# Patient Record
Sex: FEMALE | Race: WHITE | NOT HISPANIC OR LATINO | URBAN - METROPOLITAN AREA
[De-identification: names, ages, dates, MRNs, and addresses within clinical notes are randomized per-mention and may not be internally consistent; named-entity substitution may affect disease eponyms.]

---

## 2018-01-25 ENCOUNTER — INPATIENT (INPATIENT)
Facility: HOSPITAL | Age: 28
LOS: 41 days | Discharge: NURSING, REHAB OR LONG-TERM CARE | End: 2018-03-08
Attending: HOSPITALIST

## 2018-02-03 VITALS
DIASTOLIC BLOOD PRESSURE: 57 MMHG | HEART RATE: 90 BPM | SYSTOLIC BLOOD PRESSURE: 118 MMHG | RESPIRATION RATE: 19 BRPM | TEMPERATURE: 100 F

## 2018-02-03 DIAGNOSIS — T50.904A POISONING BY UNSPECIFIED DRUGS, MEDICAMENTS AND BIOLOGICAL SUBSTANCES, UNDETERMINED, INITIAL ENCOUNTER: ICD-10-CM

## 2018-02-03 DIAGNOSIS — J96.00 ACUTE RESPIRATORY FAILURE, UNSPECIFIED WHETHER WITH HYPOXIA OR HYPERCAPNIA: ICD-10-CM

## 2018-02-03 DIAGNOSIS — G93.40 ENCEPHALOPATHY, UNSPECIFIED: ICD-10-CM

## 2018-02-03 LAB
BLD GP AB SCN SERPL QL: SIGNIFICANT CHANGE UP
TYPE + AB SCN PNL BLD: SIGNIFICANT CHANGE UP

## 2018-02-03 RX ORDER — PANTOPRAZOLE SODIUM 20 MG/1
40 TABLET, DELAYED RELEASE ORAL
Qty: 0 | Refills: 0 | Status: DISCONTINUED | OUTPATIENT
Start: 2018-02-03 | End: 2018-02-05

## 2018-02-03 RX ORDER — HEPARIN SODIUM 5000 [USP'U]/ML
5000 INJECTION INTRAVENOUS; SUBCUTANEOUS EVERY 8 HOURS
Qty: 0 | Refills: 0 | Status: DISCONTINUED | OUTPATIENT
Start: 2018-02-03 | End: 2018-02-05

## 2018-02-03 RX ORDER — CHLORHEXIDINE GLUCONATE 213 G/1000ML
15 SOLUTION TOPICAL
Qty: 0 | Refills: 0 | Status: DISCONTINUED | OUTPATIENT
Start: 2018-02-03 | End: 2018-02-05

## 2018-02-03 RX ORDER — NOREPINEPHRINE BITARTRATE/D5W 8 MG/250ML
0.01 PLASTIC BAG, INJECTION (ML) INTRAVENOUS
Qty: 16 | Refills: 0 | Status: DISCONTINUED | OUTPATIENT
Start: 2018-02-03 | End: 2018-02-03

## 2018-02-03 RX ADMIN — HEPARIN SODIUM 5000 UNIT(S): 5000 INJECTION INTRAVENOUS; SUBCUTANEOUS at 15:10

## 2018-02-03 RX ADMIN — HEPARIN SODIUM 5000 UNIT(S): 5000 INJECTION INTRAVENOUS; SUBCUTANEOUS at 21:47

## 2018-02-03 RX ADMIN — CHLORHEXIDINE GLUCONATE 15 MILLILITER(S): 213 SOLUTION TOPICAL at 17:38

## 2018-02-03 NOTE — PROGRESS NOTE ADULT - SUBJECTIVE AND OBJECTIVE BOX
Patient is a 27y old  Female who presents with a chief complaint of     Diagnosis:  opioid OVERDOSE      HOSPITAL DAY NO: 7      TMAX 100.6  MAP: 77        Vent dependent: Y__x__  N ____    vent settings TV: 450   RR: 16   FIO2: 30    PEEP 5                                            Weaning time: tolerated t tube    Significant exam findings:   MS: unresponsive +ve brainstem reflex   CHEST : b/l bs   ABDOMNEN: soft   SKIN:intact      No of BMs:     Nutrition:                 __PEG _x_OG __NG  ___ORAL   ___TPN      LABS:                                                                                                                                                                                            MEDICATIONS  (STANDING):  chlorhexidine 0.12% Liquid 15 milliLiter(s) Swish and Spit two times a day  heparin  Injectable 5000 Unit(s) SubCutaneous every 8 hours  pantoprazole   Suspension 40 milliGRAM(s) Oral before breakfast    MEDICATIONS  (PRN):

## 2018-02-04 RX ADMIN — CHLORHEXIDINE GLUCONATE 15 MILLILITER(S): 213 SOLUTION TOPICAL at 17:14

## 2018-02-04 RX ADMIN — HEPARIN SODIUM 5000 UNIT(S): 5000 INJECTION INTRAVENOUS; SUBCUTANEOUS at 05:42

## 2018-02-04 RX ADMIN — HEPARIN SODIUM 5000 UNIT(S): 5000 INJECTION INTRAVENOUS; SUBCUTANEOUS at 15:02

## 2018-02-04 RX ADMIN — CHLORHEXIDINE GLUCONATE 15 MILLILITER(S): 213 SOLUTION TOPICAL at 05:42

## 2018-02-04 RX ADMIN — HEPARIN SODIUM 5000 UNIT(S): 5000 INJECTION INTRAVENOUS; SUBCUTANEOUS at 21:26

## 2018-02-04 RX ADMIN — PANTOPRAZOLE SODIUM 40 MILLIGRAM(S): 20 TABLET, DELAYED RELEASE ORAL at 05:43

## 2018-02-04 NOTE — PROGRESS NOTE ADULT - SUBJECTIVE AND OBJECTIVE BOX
27y Female PAST MEDICAL & SURGICAL HISTORY:      Hospital Day: 11  Post Operative Day:    Procedure: FOR TRACH    Events of the Last 24h: NO ACUTE EVENTS, SiO2=30%, PEEP = 5    Vital Signs Last 24 Hrs  T(C): 37.3 (04 Feb 2018 00:09), Max: 37.8 (03 Feb 2018 12:27)  T(F): 99.2 (04 Feb 2018 00:09), Max: 100 (03 Feb 2018 12:27)  HR: 59 (04 Feb 2018 00:09) (59 - 90)  BP: 118/68 (04 Feb 2018 00:09) (118/57 - 118/68)  BP(mean): --  RR: 16 (04 Feb 2018 00:09) (16 - 19)  SpO2: --      I&O's Summary    03 Feb 2018 07:01  -  04 Feb 2018 05:13  --------------------------------------------------------  IN: 300 mL / OUT: 3 mL / NET: 297 mL     I&O's Detail    03 Feb 2018 07:01  -  04 Feb 2018 05:13  --------------------------------------------------------  IN:    Enteral Tube Flush: 60 mL    Osmolite: 240 mL  Total IN: 300 mL    OUT:    Stool: 1 mL    Voided: 2 mL  Total OUT: 3 mL    Total NET: 297 mL          PHYSICAL EXAM:    GENERAL: NAD    HEENT: NCAT    CHEST/LUNGS: CTAB    HEART: RRR,  No murmurs, rubs, or gallops    ABDOMEN: SNTND +BS    EXTREMITIES:  FROM, No clubbing, cyanosis, or edema, palpable pulse    NEURO: No focal neurological deficits    SKIN: No rashes or lesions    INCISION/WOUNDS:    Diet, NPO with Tube Feed:   Osmolite 1.5 Ari    Tube Feeding Modality: Nasogastric  Total Volume for 24 Hours (mL): 2000  Continuous  Starting Tube Feed Rate mL per Hour: 40  Until Goal Tube Feed Rate (mL per Hour): 40  Tube Feed Duration (in Hours): 50  Tube Feed Start Time: 10:00  Supplement Feeding Modality:  Nasogastric  Prostat Cans or Servings Per Day:  1       Frequency:  Three Times a day (02-03-18 @ 10:53)    MEDICATIONS  (STANDING):  chlorhexidine 0.12% Liquid 15 milliLiter(s) Swish and Spit two times a day  heparin  Injectable 5000 Unit(s) SubCutaneous every 8 hours  pantoprazole   Suspension 40 milliGRAM(s) Oral before breakfast    MEDICATIONS  (PRN):      SPECTRA: [id number]

## 2018-02-04 NOTE — PROGRESS NOTE ADULT - SUBJECTIVE AND OBJECTIVE BOX
Patient is a 27y old  Female who presents with a chief complaint of     Diagnosis:  OVERDOSE      HOSPITAL DAY NO:       Vital Signs Last 24 Hrs  T(C): 37.3 (04 Feb 2018 07:30), Max: 37.8 (03 Feb 2018 12:27)  T(F): 99.1 (04 Feb 2018 07:30), Max: 100 (03 Feb 2018 12:27)  HR: 99 (04 Feb 2018 07:30) (59 - 117)  BP: 115/52 (04 Feb 2018 07:30) (115/52 - 118/68)  BP(mean): 73 (04 Feb 2018 07:30) (73 - 73)  RR: 16 (04 Feb 2018 07:30) (16 - 19)  SpO2: 99% (04 Feb 2018 07:18) (99% - 99%)    use of pressors: Y ___  N ____    use of sedation: Y ___  N ____    Vent dependent: Y____  N ____    Mode: AC/ CMV (Assist Control/ Continuous Mandatory Ventilation)  RR (machine): 16  TV (machine): 450  FiO2: 30  PEEP: 5  ITime: 1  MAP: 10  PIP: 23                                          Weaning time:     Significant exam findings:   MS:  CHEST: B/L breath sounds   ABDOMEN: soft   HEART:S1/S2 regular  SKIN:     No of BMs:   OUT:    Stool: 1 mL  Total OUT: 1 mL    Total NET: -1 mL          Nutrition:                    02-03-18 @ 07:01  -  02-04-18 @ 07:00  --------------------------------------------------------  IN:    Osmolite: 240 mL  Total IN: 240 mL          LABS:                                                                                                                                                                                            MEDICATIONS  (STANDING):  chlorhexidine 0.12% Liquid 15 milliLiter(s) Swish and Spit two times a day  heparin  Injectable 5000 Unit(s) SubCutaneous every 8 hours  pantoprazole   Suspension 40 milliGRAM(s) Oral before breakfast    MEDICATIONS  (PRN):          Case discussed with staff and family at the bedside Patient is a 27y old  Female who presents with a chief complaint of     Diagnosis:  opioid OVERDOSE  unresposive       HOSPITAL DAY NO: 8      Vital Signs Last 24 Hrs  T(C): 37.3 (04 Feb 2018 07:30), Max: 37.8 (03 Feb 2018 12:27)  T(F): 99.1 (04 Feb 2018 07:30), Max: 100 (03 Feb 2018 12:27)  HR: 99 (04 Feb 2018 07:30) (59 - 117)  BP: 115/52 (04 Feb 2018 07:30) (115/52 - 118/68)  BP(mean): 73 (04 Feb 2018 07:30) (73 - 73)  RR: 16 (04 Feb 2018 07:30) (16 - 19)  SpO2: 99% (04 Feb 2018 07:18) (99% - 99%)    use of pressors: Y ___  N __x__    use of sedation: Y ___  N _x___    Vent dependent: Y_x___  N ____    Mode: AC/ CMV (Assist Control/ Continuous Mandatory Ventilation)  RR (machine): 16  TV (machine): 450  FiO2: 30  PEEP: 5  ITime: 1  MAP: 10  PIP: 23                                          Weaning time: no weaning trials     Significant exam findings:   MS: unchaged   CHEST: B/L breath sounds   ABDOMEN: soft   HEART:S1/S2 regular  SKIN: intact     No of BMs: 1  OUT:    Stool: 1 mL  Total OUT: 1 mL    Total NET: -1 mL          Nutrition:                    02-03-18 @ 07:01  -  02-04-18 @ 07:00  --------------------------------------------------------  IN:    Osmolite: 240 mL  Total IN: 240 mL          LABS:                                                                                                                                                                                            MEDICATIONS  (STANDING):  chlorhexidine 0.12% Liquid 15 milliLiter(s) Swish and Spit two times a day  heparin  Injectable 5000 Unit(s) SubCutaneous every 8 hours  pantoprazole   Suspension 40 milliGRAM(s) Oral before breakfast    MEDICATIONS  (PRN):          Case discussed with staff and family at the bedside

## 2018-02-05 LAB — HCG UR QL: NEGATIVE — SIGNIFICANT CHANGE UP

## 2018-02-05 RX ORDER — CHLORHEXIDINE GLUCONATE 213 G/1000ML
15 SOLUTION TOPICAL
Qty: 0 | Refills: 0 | Status: DISCONTINUED | OUTPATIENT
Start: 2018-02-05 | End: 2018-03-08

## 2018-02-05 RX ORDER — HEPARIN SODIUM 5000 [USP'U]/ML
5000 INJECTION INTRAVENOUS; SUBCUTANEOUS EVERY 8 HOURS
Qty: 0 | Refills: 0 | Status: DISCONTINUED | OUTPATIENT
Start: 2018-02-05 | End: 2018-03-08

## 2018-02-05 RX ORDER — PANTOPRAZOLE SODIUM 20 MG/1
40 TABLET, DELAYED RELEASE ORAL
Qty: 0 | Refills: 0 | Status: DISCONTINUED | OUTPATIENT
Start: 2018-02-05 | End: 2018-03-08

## 2018-02-05 RX ADMIN — CHLORHEXIDINE GLUCONATE 15 MILLILITER(S): 213 SOLUTION TOPICAL at 05:31

## 2018-02-05 RX ADMIN — HEPARIN SODIUM 5000 UNIT(S): 5000 INJECTION INTRAVENOUS; SUBCUTANEOUS at 21:54

## 2018-02-05 NOTE — BRIEF OPERATIVE NOTE - PROCEDURE
<<-----Click on this checkbox to enter Procedure PEG (percutaneous endoscopic gastrostomy)  02/05/2018    Active  [email or web address]  Tracheostomy  02/05/2018    Active  [email or web address]

## 2018-02-05 NOTE — PROGRESS NOTE ADULT - SUBJECTIVE AND OBJECTIVE BOX
POSTOPERATIVE NOTE    PROCEUDRE: S/P PEG AND TRACH    Vital Signs Last 24 Hrs  T(C): 37.4 (05 Feb 2018 21:30), Max: 37.8 (05 Feb 2018 00:28)  T(F): 99.3 (05 Feb 2018 21:30), Max: 100.1 (05 Feb 2018 00:28)  HR: 102 (05 Feb 2018 21:30) (71 - 120)  BP: 136/88 (05 Feb 2018 21:30) (111/62 - 136/88)  BP(mean): 79 (05 Feb 2018 07:05) (79 - 79)  RR: 23 (05 Feb 2018 21:30) (16 - 23)  SpO2: 99% (05 Feb 2018 08:50) (98% - 100%)    Mode: AC/ CMV (Assist Control/ Continuous Mandatory Ventilation)  RR (machine): 16  TV (machine): 450  FiO2: 30  PEEP: 5  ITime: 1  MAP: 8  PIP: 19      PHYSICAL EXAM:    GENERAL: NAD    CARD: S1, S2, RRR    PULM:  CTABL, VENTED    ABD: SOFT, NT, ND    EXT: WNL    NEURO: VENTED      LABS:          I&O's Detail    04 Feb 2018 07:01  -  05 Feb 2018 07:00  --------------------------------------------------------  IN:    Osmolite: 720 mL    Osmolite: 180 mL  Total IN: 900 mL    OUT:    Stool: 1 mL    Voided: 2 mL  Total OUT: 3 mL    Total NET: 897 mL      05 Feb 2018 07:01  -  05 Feb 2018 22:29  --------------------------------------------------------  IN:  Total IN: 0 mL    OUT:    Voided: 1 mL  Total OUT: 1 mL    Total NET: -1 mL          MEDS:  MEDICATIONS  (STANDING):  chlorhexidine 0.12% Liquid 15 milliLiter(s) Swish and Spit two times a day  heparin  Injectable 5000 Unit(s) SubCutaneous every 8 hours  pantoprazole   Suspension 40 milliGRAM(s) Oral before breakfast    MEDICATIONS  (PRN):

## 2018-02-05 NOTE — PROGRESS NOTE ADULT - SUBJECTIVE AND OBJECTIVE BOX
Patient is a 27y old  Female who presents with a chief complaint of   OVERDOSE    s/p neuro f/u and surgery eval for trach   OVERDOSE      HOSPITAL DAY NO: 11      Vital Signs Last 24 Hrs  T(C): 37.6 (05 Feb 2018 07:05), Max: 38 (04 Feb 2018 16:23)  T(F): 99.6 (05 Feb 2018 07:05), Max: 100.4 (04 Feb 2018 16:23)  HR: 71 (05 Feb 2018 07:05) (71 - 120)  BP: 111/62 (05 Feb 2018 07:05) (105/55 - 117/59)  BP(mean): 79 (05 Feb 2018 07:05) (79 - 79)  RR: 16 (05 Feb 2018 07:05) (16 - 18)  SpO2: 98% (05 Feb 2018 01:36) (98% - 99%)    use of pressors: Y ___  N ____    use of sedation: Y ___  N ____    Vent dependent: Y____  N ____    Mode: AC/ CMV (Assist Control/ Continuous Mandatory Ventilation)  RR (machine): 16  TV (machine): 450  FiO2: 30  PEEP: 5  ITime: 1  MAP: 4  PIP: 14                                          Weaning time:     Significant exam findings:   MS:  CHEST: B/L breath sounds   ABDOMEN: soft   HEART:S1/S2 regular  SKIN:     No of BMs:   OUT: 0.01 mL/kg/d        Nutrition:                    02-04-18 @ 07:01  -  02-05-18 @ 07:00  --------------------------------------------------------  IN:    Osmolite: 720 mL    Osmolite: 180 mL  Total IN: 900 mL          LABS:                                                                                                                                                                                            MEDICATIONS  (STANDING):  chlorhexidine 0.12% Liquid 15 milliLiter(s) Swish and Spit two times a day  heparin  Injectable 5000 Unit(s) SubCutaneous every 8 hours  pantoprazole   Suspension 40 milliGRAM(s) Oral before breakfast    MEDICATIONS  (PRN):          Case discussed with staff and family at the bedside

## 2018-02-05 NOTE — BRIEF OPERATIVE NOTE - OPERATION/FINDINGS
no acute intraop findings, trach was placed between 2nd and 3rd rings and stay sutures were left. transillumination was seen before placing peg tube

## 2018-02-05 NOTE — PROGRESS NOTE ADULT - SUBJECTIVE AND OBJECTIVE BOX
27y Female PAST MEDICAL & SURGICAL HISTORY: [facility] Day: 12  Post Operative Day:    Procedure: for trach    Events of the Last 24h:    Vital Signs Last 24 Hrs  T(C): 37.8 (05 Feb 2018 00:28), Max: 38 (04 Feb 2018 16:23)  T(F): 100.1 (05 Feb 2018 00:28), Max: 100.4 (04 Feb 2018 16:23)  HR: 120 (05 Feb 2018 01:36) (82 - 120)  BP: 117/59 (05 Feb 2018 00:28) (105/55 - 117/59)  BP(mean): 73 (04 Feb 2018 07:30) (73 - 73)  RR: 18 (05 Feb 2018 00:28) (16 - 18)  SpO2: 98% (05 Feb 2018 01:36) (98% - 99%)    Mode: AC/ CMV (Assist Control/ Continuous Mandatory Ventilation), RR (machine): 16, TV (machine): 450, FiO2: 30, PEEP: 5, ITime: 1, MAP: 4, PIP: 14  I&O's Summary    03 Feb 2018 07:01  -  04 Feb 2018 07:00  --------------------------------------------------------  IN: 900 mL / OUT: 3 mL / NET: 897 mL    04 Feb 2018 07:01  -  05 Feb 2018 05:51  --------------------------------------------------------  IN: 600 mL / OUT: 1 mL / NET: 599 mL     I&O's Detail    03 Feb 2018 07:01  -  04 Feb 2018 07:00  --------------------------------------------------------  IN:    Enteral Tube Flush: 120 mL    Enteral Tube Flush: 540 mL    Osmolite: 240 mL  Total IN: 900 mL    OUT:    Stool: 1 mL    Voided: 2 mL  Total OUT: 3 mL    Total NET: 897 mL      04 Feb 2018 07:01  -  05 Feb 2018 05:51  --------------------------------------------------------  IN:    Osmolite: 120 mL    Osmolite: 480 mL  Total IN: 600 mL    OUT:    Stool: 1 mL  Total OUT: 1 mL    Total NET: 599 mL          PHYSICAL EXAM:    GENERAL: NAD    HEENT: NCAT    CHEST/LUNGS: CTAB    HEART: RRR,  No murmurs, rubs, or gallops    ABDOMEN: SNTND +BS    EXTREMITIES:  FROM, No clubbing, cyanosis, or edema, palpable pulse    NEURO: No focal neurological deficits    SKIN: No rashes or lesions    INCISION/WOUNDS:    Diet, NPO with Tube Feed:   Osmolite 1.5 Ari    Tube Feeding Modality: Nasogastric  Total Volume for 24 Hours (mL): 2000  Continuous  Starting Tube Feed Rate mL per Hour: 40  Until Goal Tube Feed Rate (mL per Hour): 40  Tube Feed Duration (in Hours): 50  Tube Feed Start Time: 10:00  Supplement Feeding Modality:  Nasogastric  Prostat Cans or Servings Per Day:  1       Frequency:  Three Times a day (02-03-18 @ 10:53)  Diet, NPO after Midnight:      NPO Start Date: 04-Feb-2018,   NPO Start Time: 23:59 (02-04-18 @ 13:01)    MEDICATIONS  (STANDING):  chlorhexidine 0.12% Liquid 15 milliLiter(s) Swish and Spit two times a day  heparin  Injectable 5000 Unit(s) SubCutaneous every 8 hours  pantoprazole   Suspension 40 milliGRAM(s) Oral before breakfast    MEDICATIONS  (PRN):          IMAGING: < from: Xray Chest 1 View AP/PA (02.04.18 @ 13:25) >  EXAM:  XR CHEST FRONTAL 1V            PROCEDURE DATE:  02/03/2018            INTERPRETATION:  Clinical History / Reason for exam: Shortness of breath    Comparison : Chest radiograph 2/2/2018.    Technique/Positioning: Satisfactory.    Findings:    Support devices: Stable ET tube and enteric tube    Cardiac/mediastinum/hilum: Stable    Lung parenchyma/Pleura: Improved bilateral opacities. Linear retrocardiac   atelectasis. No pleural effusion or pneumothorax.    Skeleton/soft tissues: Stable    Impression:      Improved bilateral opacities    < end of copied text >        SPECTRA: [id number]

## 2018-02-05 NOTE — BRIEF OPERATIVE NOTE - POST-OP DX
Dysphagia  02/05/2018    Active  Lemuel Singh  Respiratory failure  02/05/2018    Active  Lemuel Singh

## 2018-02-05 NOTE — PROGRESS NOTE ADULT - SUBJECTIVE AND OBJECTIVE BOX
Neurology Follow up note    Name  MICHELLE DRISCOLL    Interval History:    27 year old female with PMH of Asperger was found unconscious by family members with cyanotic lips next to morphine pills and IV Robitussin. Patient received Narcan on the field and in the ED. She was later sedated and intubated secondary to hypercapnia. 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 Patient was seen and examined this morning. Family is at bedside. Mother reports that she has been more awake than when she was admitted and states that she sometimes makes eye contact, moves her limbs a few inches and cries when the mother leaves the room. Otherwise, mother reports that she did not see any jerking movements or seizure like activities. She also states that her status fluctuates, sometimes awake sometimes asleep. She is currently intubated and will receive a tracheostomy today 02/05/18. Neurology last saw the patient and gave a guarded prognosis to family stating that strong likelihood of future handicap and mental changes. EEG completed on 02/01/18 states that there are no clinical or subclinical seizures. AEDs were not started. Mother wants to speak with neurology if there are any new findings.         Vital Signs Last 24 Hrs  T(C): 37.6 (05 Feb 2018 07:05), Max: 38 (04 Feb 2018 16:23)  T(F): 99.6 (05 Feb 2018 07:05), Max: 100.4 (04 Feb 2018 16:23)  HR: 71 (05 Feb 2018 07:05) (71 - 120)  BP: 111/62 (05 Feb 2018 07:05) (105/55 - 117/59)  BP(mean): 79 (05 Feb 2018 07:05) (79 - 79)  RR: 16 (05 Feb 2018 07:05) (16 - 18)  SpO2: 98% (05 Feb 2018 01:36) (98% - 99%)    Neurological Exam:   Mental status: Patient was intubated and sleeping in bed in NAD. The patient is not awake, does not respond to verbal stimuli or pain, does not follow commands  Cranial nerves: Pupils equally round and reactive to light, corneal reflex present  Tone: Increased tone in UE, normal tone in LE  Motor:  Could not assess   Sensation: Could not assess  Coordination: Could not assess  Reflexes: 2+ in bilateral UE/LE, downgoing toes bilaterally  Gait: Could not assess    Medications  chlorhexidine 0.12% Liquid 15 milliLiter(s) Swish and Spit two times a day  heparin  Injectable 5000 Unit(s) SubCutaneous every 8 hours  pantoprazole   Suspension 40 milliGRAM(s) Oral before breakfast      Lab      Radiology  CT head 01/25: No CT evidence of acute intracranial pathology  CT head 01/26: Early evidence of cerebral edema. Hypodensities in posterior b/l cerebellar hemispheres and subcortical white matter. 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 Baseline EEG is abnormal however shows significant improvement compared to the 24 hour prior to that. The EEG is more reactive and more organized.    Assessment:  This is a 27 year old female with PMH of Asperger who was found unconscious by family members secondary to drug overdose. 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 Video EEG showed no clinical or subclinical seizures with improvement of baseline EEG compared to the one completed in the prior 24 hours. Neurology had discussed with family members about the guarded prognosis and strong likelihood for future handicap and mental changes.      Plan: Neurology Follow up note    Name  MICHELLE DRISCOLL    Interval History:    27 year old female with PMH of Asperger was found unconscious by family members with cyanotic lips next to morphine pills and IV Robitussin. Patient received Narcan on the field and in the ED. She was later sedated and intubated secondary to hypercapnia. Neurology was consulted for concern of anoxic brain injury. Patient was seen and examined this morning. Family is at bedside. Mother reports that she has been more awake than when she was admitted and states that she sometimes makes eye contact, moves her limbs a few inches and cries when the mother leaves the room. Otherwise, mother reports that she did not see any jerking movements or seizure like activities. She also states that her status fluctuates, sometimes awake sometimes asleep. She is currently intubated and will receive a tracheostomy today 02/05/18. Neurology last saw the patient and gave a guarded prognosis to family stating that strong likelihood of future handicap and mental changes. EEG completed on 02/01/18 states that there are no clinical or subclinical seizures. AEDs were not started. Mother wants to speak with neurology if there are any new findings.     Vital Signs Last 24 Hrs  T(C): 37.6 (05 Feb 2018 07:05), Max: 38 (04 Feb 2018 16:23)  T(F): 99.6 (05 Feb 2018 07:05), Max: 100.4 (04 Feb 2018 16:23)  HR: 71 (05 Feb 2018 07:05) (71 - 120)  BP: 111/62 (05 Feb 2018 07:05) (105/55 - 117/59)  BP(mean): 79 (05 Feb 2018 07:05) (79 - 79)  RR: 16 (05 Feb 2018 07:05) (16 - 18)  SpO2: 98% (05 Feb 2018 01:36) (98% - 99%)    Neurological Exam:   Mental status: Patient was intubated and sleeping in bed in NAD. The patient is not awake, does not respond to verbal stimuli or pain, does not follow commands  Cranial nerves: Pupils equally round and reactive to light, corneal reflex present  Tone: Increased tone in UE, normal tone in LE  Motor:  Could not assess   Sensation: Could not assess  Coordination: Could not assess  Reflexes: 2+ in bilateral UE/LE, downgoing toes bilaterally  Gait: Could not assess    Medications  chlorhexidine 0.12% Liquid 15 milliLiter(s) Swish and Spit two times a day  heparin  Injectable 5000 Unit(s) SubCutaneous every 8 hours  pantoprazole   Suspension 40 milliGRAM(s) Oral before breakfast    Radiology  CT head 01/25: No CT evidence of acute intracranial pathology  CT head 01/26: Early evidence of cerebral edema. Hypodensities in posterior b/l cerebellar hemispheres and subcortical white matter. 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 Baseline EEG is abnormal however shows significant improvement compared to the 24 hour prior to that. The EEG is more reactive and more organized.    Assessment:  This is a 27 year old female with PMH of Asperger who was found unconscious by family members secondary to drug overdose. 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 Video EEG showed no clinical or subclinical seizures with improvement of baseline EEG compared to the one completed in the prior 24 hours. Neurology had discussed with family members about the guarded prognosis and strong likelihood for future handicap and mental changes.

## 2018-02-05 NOTE — PRE-ANESTHESIA EVALUATION ADULT - NSANTHPEFT_GEN_ALL_CORE
received pt intubated and asleep, unresponsive without purposeful movements being ventilated by RT
Chest B/L Air Entry  [facility] S1S2

## 2018-02-05 NOTE — PRE-ANESTHESIA EVALUATION ADULT - NSANTHOSAYNRD_GEN_A_CORE
No. NOEMI screening performed.  STOP BANG Legend: 0-2 = LOW Risk; 3-4 = INTERMEDIATE Risk; 5-8 = HIGH Risk

## 2018-02-06 LAB
ABO RH CONFIRMATION: SIGNIFICANT CHANGE UP
ANION GAP SERPL CALC-SCNC: 8 MMOL/L — SIGNIFICANT CHANGE UP (ref 7–14)
ANION GAP SERPL CALC-SCNC: 8 MMOL/L — SIGNIFICANT CHANGE UP (ref 7–14)
BUN SERPL-MCNC: 13 MG/DL — SIGNIFICANT CHANGE UP (ref 10–20)
BUN SERPL-MCNC: 15 MG/DL — SIGNIFICANT CHANGE UP (ref 10–20)
CALCIUM SERPL-MCNC: 9 MG/DL — SIGNIFICANT CHANGE UP (ref 8.5–10.1)
CALCIUM SERPL-MCNC: 9.2 MG/DL — SIGNIFICANT CHANGE UP (ref 8.5–10.1)
CHLORIDE SERPL-SCNC: 102 MMOL/L — SIGNIFICANT CHANGE UP (ref 98–110)
CHLORIDE SERPL-SCNC: 107 MMOL/L — SIGNIFICANT CHANGE UP (ref 98–110)
CO2 SERPL-SCNC: 25 MMOL/L — SIGNIFICANT CHANGE UP (ref 17–32)
CO2 SERPL-SCNC: 25 MMOL/L — SIGNIFICANT CHANGE UP (ref 17–32)
CREAT SERPL-MCNC: 0.5 MG/DL — LOW (ref 0.7–1.5)
CREAT SERPL-MCNC: 0.5 MG/DL — LOW (ref 0.7–1.5)
GLUCOSE SERPL-MCNC: 111 MG/DL — HIGH (ref 70–110)
GLUCOSE SERPL-MCNC: 99 MG/DL — SIGNIFICANT CHANGE UP (ref 70–110)
HCT VFR BLD CALC: 28.8 % — LOW (ref 37–47)
HCT VFR BLD CALC: 28.9 % — LOW (ref 37–47)
HGB BLD-MCNC: 10 G/DL — LOW (ref 14–18)
HGB BLD-MCNC: 10 G/DL — LOW (ref 14–18)
MCHC RBC-ENTMCNC: 31.3 PG — HIGH (ref 27–31)
MCHC RBC-ENTMCNC: 31.3 PG — HIGH (ref 27–31)
MCHC RBC-ENTMCNC: 34.6 G/DL — SIGNIFICANT CHANGE UP (ref 32–37)
MCHC RBC-ENTMCNC: 34.7 G/DL — SIGNIFICANT CHANGE UP (ref 32–37)
MCV RBC AUTO: 90 FL — SIGNIFICANT CHANGE UP (ref 81–91)
MCV RBC AUTO: 90.3 FL — SIGNIFICANT CHANGE UP (ref 81–91)
NRBC # BLD: 0 /100 WBCS — SIGNIFICANT CHANGE UP (ref 0–0)
NRBC # BLD: 0 /100 WBCS — SIGNIFICANT CHANGE UP (ref 0–0)
PLATELET # BLD AUTO: 334 K/UL — SIGNIFICANT CHANGE UP (ref 130–400)
PLATELET # BLD AUTO: 348 K/UL — SIGNIFICANT CHANGE UP (ref 130–400)
POTASSIUM SERPL-MCNC: 3.8 MMOL/L — SIGNIFICANT CHANGE UP (ref 3.5–5)
POTASSIUM SERPL-MCNC: 3.9 MMOL/L — SIGNIFICANT CHANGE UP (ref 3.5–5)
POTASSIUM SERPL-SCNC: 3.8 MMOL/L — SIGNIFICANT CHANGE UP (ref 3.5–5)
POTASSIUM SERPL-SCNC: 3.9 MMOL/L — SIGNIFICANT CHANGE UP (ref 3.5–5)
RBC # BLD: 3.2 M/UL — LOW (ref 4.2–5.4)
RBC # BLD: 3.2 M/UL — LOW (ref 4.2–5.4)
RBC # FLD: 13.2 % — SIGNIFICANT CHANGE UP (ref 11.5–14.5)
RBC # FLD: 13.2 % — SIGNIFICANT CHANGE UP (ref 11.5–14.5)
SODIUM SERPL-SCNC: 135 MMOL/L — SIGNIFICANT CHANGE UP (ref 135–146)
SODIUM SERPL-SCNC: 140 MMOL/L — SIGNIFICANT CHANGE UP (ref 135–146)
WBC # BLD: 5.96 K/UL — SIGNIFICANT CHANGE UP (ref 4.8–10.8)
WBC # BLD: 7.4 K/UL — SIGNIFICANT CHANGE UP (ref 4.8–10.8)
WBC # FLD AUTO: 5.96 K/UL — SIGNIFICANT CHANGE UP (ref 4.8–10.8)
WBC # FLD AUTO: 7.4 K/UL — SIGNIFICANT CHANGE UP (ref 4.8–10.8)

## 2018-02-06 RX ORDER — ACETAMINOPHEN 500 MG
650 TABLET ORAL EVERY 4 HOURS
Qty: 0 | Refills: 0 | Status: DISCONTINUED | OUTPATIENT
Start: 2018-02-06 | End: 2018-03-08

## 2018-02-06 RX ADMIN — HEPARIN SODIUM 5000 UNIT(S): 5000 INJECTION INTRAVENOUS; SUBCUTANEOUS at 13:47

## 2018-02-06 RX ADMIN — CHLORHEXIDINE GLUCONATE 15 MILLILITER(S): 213 SOLUTION TOPICAL at 17:28

## 2018-02-06 RX ADMIN — PANTOPRAZOLE SODIUM 40 MILLIGRAM(S): 20 TABLET, DELAYED RELEASE ORAL at 06:03

## 2018-02-06 RX ADMIN — Medication 650 MILLIGRAM(S): at 08:25

## 2018-02-06 RX ADMIN — CHLORHEXIDINE GLUCONATE 15 MILLILITER(S): 213 SOLUTION TOPICAL at 05:16

## 2018-02-06 RX ADMIN — HEPARIN SODIUM 5000 UNIT(S): 5000 INJECTION INTRAVENOUS; SUBCUTANEOUS at 21:14

## 2018-02-06 RX ADMIN — HEPARIN SODIUM 5000 UNIT(S): 5000 INJECTION INTRAVENOUS; SUBCUTANEOUS at 05:16

## 2018-02-06 NOTE — CHART NOTE - NSCHARTNOTEFT_GEN_A_CORE
Registered Dietitian Follow-Up     Patient Profile Reviewed                           Yes [x]   No []     Nutrition History Previously Obtained        Yes [x]  No []       Pertinent Subjective Information: Per RN, although patient has an order for continuous feeding, pt has been feeding with it in bolus at 240cc q6hr with prostat q8hr. Tolerating well. LBM yesterday. Per mother at bedside, reported UBW before hospital admission was 155# stable weight. with height 65 inches.      Pertinent Medical Interventions: Per Sx note,  s/p trach and PEG. Active BS.  Pt p/w with opioid overdosed. Pt was in ICU until recent transfer to Vent unit.     Diet order:  Osmolite 1.5 G tube 40cc/hr + prostate q8hr. However, RN reported pt has been fed with bolus instead. Kcal and protein remains the same     Anthropometrics:  - Ht.  65 inches  - Wt.  RD documented previous weight was 78.2kg in the ICU, vs, EMR reported on [place] on 2/5 was 55kg. Likely error in measurement. Therefore, UBW reported by mother of 70.4kg will be used for all MSJ calculation below  - %wt change  - BMI    26  - IBW     Pertinent Lab Data: 2/6: RBC 3.2, H/H 10/28.9, Cr 0.5     Pertinent Meds:  acetaminophen, protonix,      Physical Findings:   - Appearance:  Pt is not able to talk at this time. Able to open her eyes a little. no edema noted. trached to t-tube  - GI function:  LBM 2/5 per RN. No other GI problem noted at this time  - Tubes: PEG  - Oral/Mouth cavity: NPO  - Skin:   skin intact per EMR     Nutrition Requirements  Weight Used:  70.4kg UBW     Estimated Energy Needs    Continue []  Adjust [x]  Adjusted Energy Recommendations:   kcal/day      8771-8127 kcal/day (MSJ x 1.2-1.3) because pt is not on trached t-tube with PEG      Estimated Protein Needs    Continue []  Adjust [x]  Adjusted Protein Recommendations:   gm/day     71-85 g/day (1.0-1.2 g/kg of UBW)     Estimated Fluid Needs        Continue []  Adjust [x]  Adjusted Fluid Recommendations:   mL/day    1ml/kcal or per vent     Nutrient Intake:        [x] Previous Nutrition Diagnosis:  (1) Inadequate protein-energy intake - resolved. However, will reduce 1 prostat per day, from ProStat q8hr to q12hr.             [] Ongoing          [x] Resolved    [] No active nutrition diagnosis identified at this time     Nutrition Diagnostic #1  Problem:  Etiology:  Statement:     Nutrition Diagnostic #2  Problem:  Etiology:  Statement:     Nutrition Intervention: Rec: (1) Please continue with Osmolite 1.5 240cc q6hr but please reduce ProStat rate from q8hr to q12hr. This regimen gives a total of 1620kcal/90g protein/ 730cc free water. Will consider changing to Jevity 1.2 later this week.     Goal/Expected Outcome: Pt to meet and tolerate >85% of estimated kcal/protein via TF upon f/u due 2/13.     Indicator/Monitoring:  RD to monitor diet order, energy intake, body composition, nutrition focused physical findings (en tolerance)

## 2018-02-06 NOTE — PROGRESS NOTE ADULT - SUBJECTIVE AND OBJECTIVE BOX
27y Female PAST MEDICAL & SURGICAL HISTORY:      Hospital Day: 13  Post Operative Day: 1    Procedure: s/p trach and peg    Events of the Last 24h:    Vital Signs Last 24 Hrs  T(C): 38.3 (06 Feb 2018 05:38), Max: 38.3 (06 Feb 2018 05:38)  T(F): 101 (06 Feb 2018 05:38), Max: 101 (06 Feb 2018 05:38)  HR: 95 (06 Feb 2018 00:40) (95 - 102)  BP: 136/88 (05 Feb 2018 21:30) (136/88 - 136/88)  BP(mean): --  RR: 23 (05 Feb 2018 21:30) (23 - 23)  SpO2: 98% (06 Feb 2018 00:40) (98% - 98%)    Mode: AC/ CMV (Assist Control/ Continuous Mandatory Ventilation), RR (machine): 16, TV (machine): 450, FiO2: 30, PEEP: 5, ITime: 1, MAP: 9, PIP: 19  I&O's Summary    05 Feb 2018 07:01  -  06 Feb 2018 07:00  --------------------------------------------------------  IN: 540 mL / OUT: 2 mL / NET: 538 mL     I&O's Detail    05 Feb 2018 07:01  -  06 Feb 2018 07:00  --------------------------------------------------------  IN:    Enteral Tube Flush: 60 mL    Osmolite: 480 mL  Total IN: 540 mL    OUT:    Stool: 1 mL    Voided: 1 mL  Total OUT: 2 mL    Total NET: 538 mL          PHYSICAL EXAM:    GENERAL: NAD    HEENT: NCAT    CHEST/LUNGS: CTAB    HEART: RRR,  No murmurs, rubs, or gallops    ABDOMEN: SNTND +BS    EXTREMITIES:  FROM, No clubbing, cyanosis, or edema, palpable pulse    NEURO: No focal neurological deficits    SKIN: No rashes or lesions    INCISION/WOUNDS:    Diet, NPO with Tube Feed:   Osmolite 1.5 Ari    Tube Feeding Modality: Nasogastric  Total Volume for 24 Hours (mL): 2000  Continuous  Starting Tube Feed Rate mL per Hour: 40  Until Goal Tube Feed Rate (mL per Hour): 40  Tube Feed Duration (in Hours): 50  Tube Feed Start Time: 10:00  Supplement Feeding Modality:  Nasogastric  Prostat Cans or Servings Per Day:  1       Frequency:  Three Times a day (02-05-18 @ 20:16)    MEDICATIONS  (STANDING):  chlorhexidine 0.12% Liquid 15 milliLiter(s) Swish and Spit two times a day  heparin  Injectable 5000 Unit(s) SubCutaneous every 8 hours  pantoprazole   Suspension 40 milliGRAM(s) Oral before breakfast    MEDICATIONS  (PRN):  acetaminophen    Suspension 650 milliGRAM(s) Oral every 4 hours PRN For Temp greater than 38 C (100.4 F)              SPECTRA:[id number]

## 2018-02-06 NOTE — PROGRESS NOTE ADULT - SUBJECTIVE AND OBJECTIVE BOX
Patient is a 27y old  Female who presents with a chief complaint of   OVERDOSE      Diagnosis:  OVERDOSE  s/p trach and peg       HOSPITAL DAY NO: 12    Vital Signs Last 24 Hrs  T(C): 38.3 (06 Feb 2018 05:38), Max: 38.3 (06 Feb 2018 05:38)  T(F): 101 (06 Feb 2018 05:38), Max: 101 (06 Feb 2018 05:38)  HR: 95 (06 Feb 2018 00:40) (95 - 102)  BP: 136/88 (05 Feb 2018 21:30) (136/88 - 136/88)  BP(mean): 104  RR: 23 (05 Feb 2018 21:30) (23 - 23)  SpO2: 98% (06 Feb 2018 00:40) (98% - 98%)    use of pressors: Y ___  N _x___    use of sedation: Y ___  N __x__    Vent dependent: Y_x___  N ____    Mode: AC/ CMV (Assist Control/ Continuous Mandatory Ventilation)  RR (machine): 16  TV (machine): 450  FiO2: 30  PEEP: 5  ITime: 1  MAP: 9  PIP: 19                                          Weaning time: off t piece for 2 hours     Significant exam findings:   MS: unchanged   CHEST: B/L breath sounds   ABDOMEN: soft   HEART:S1/S2 regular  SKIN: intact     No of BMs:   OUT: 0.02 mL/kg/d        Nutrition: PEG                    02-05-18 @ 07:01  -  02-06-18 @ 07:00  --------------------------------------------------------  IN:    Osmolite: 480 mL  Total IN: 480 mL          LABS:                                                                                                                                                                                            MEDICATIONS  (STANDING):  chlorhexidine 0.12% Liquid 15 milliLiter(s) Swish and Spit two times a day  heparin  Injectable 5000 Unit(s) SubCutaneous every 8 hours  pantoprazole   Suspension 40 milliGRAM(s) Oral before breakfast    MEDICATIONS  (PRN):  acetaminophen    Suspension 650 milliGRAM(s) Oral every 4 hours PRN For Temp greater than 38 C (100.4 F)          Case discussed with staff and family at the bedside Patient is a 27y old  Female who presents with a chief complaint of   OVERDOSE      Diagnosis:  OVERDOSE  s/p trach and peg   spiking fevers      HOSPITAL DAY NO: 12    Vital Signs Last 24 Hrs  T(C): 38.3 (06 Feb 2018 05:38), Max: 38.3 (06 Feb 2018 05:38)  T(F): 101 (06 Feb 2018 05:38), Max: 101 (06 Feb 2018 05:38)  HR: 95 (06 Feb 2018 00:40) (95 - 102)  BP: 136/88 (05 Feb 2018 21:30) (136/88 - 136/88)  BP(mean): 104  RR: 23 (05 Feb 2018 21:30) (23 - 23)  SpO2: 98% (06 Feb 2018 00:40) (98% - 98%)    use of pressors: Y ___  N _x___    use of sedation: Y ___  N __x__    Vent dependent: Y_x___  N ____    Mode: AC/ CMV (Assist Control/ Continuous Mandatory Ventilation)  RR (machine): 16  TV (machine): 450  FiO2: 30  PEEP: 5  ITime: 1  MAP: 9  PIP: 19                                          Weaning time: off t piece for 2 hours     Significant exam findings:   MS: unchanged   CHEST: B/L breath sounds   ABDOMEN: soft   HEART:S1/S2 regular  SKIN: intact     No of BMs:   OUT: 0.02 mL/kg/d        Nutrition: PEG                    02-05-18 @ 07:01  -  02-06-18 @ 07:00  --------------------------------------------------------  IN:    Osmolite: 480 mL  Total IN: 480 mL          LABS:                                                                                                                                                                                            MEDICATIONS  (STANDING):  chlorhexidine 0.12% Liquid 15 milliLiter(s) Swish and Spit two times a day  heparin  Injectable 5000 Unit(s) SubCutaneous every 8 hours  pantoprazole   Suspension 40 milliGRAM(s) Oral before breakfast    MEDICATIONS  (PRN):  acetaminophen    Suspension 650 milliGRAM(s) Oral every 4 hours PRN For Temp greater than 38 C (100.4 F)          Case discussed with staff and family at the bedside

## 2018-02-07 RX ADMIN — Medication 650 MILLIGRAM(S): at 02:16

## 2018-02-07 RX ADMIN — CHLORHEXIDINE GLUCONATE 15 MILLILITER(S): 213 SOLUTION TOPICAL at 05:26

## 2018-02-07 RX ADMIN — PANTOPRAZOLE SODIUM 40 MILLIGRAM(S): 20 TABLET, DELAYED RELEASE ORAL at 06:39

## 2018-02-07 RX ADMIN — Medication 650 MILLIGRAM(S): at 23:24

## 2018-02-07 RX ADMIN — HEPARIN SODIUM 5000 UNIT(S): 5000 INJECTION INTRAVENOUS; SUBCUTANEOUS at 14:17

## 2018-02-07 RX ADMIN — HEPARIN SODIUM 5000 UNIT(S): 5000 INJECTION INTRAVENOUS; SUBCUTANEOUS at 21:12

## 2018-02-07 RX ADMIN — HEPARIN SODIUM 5000 UNIT(S): 5000 INJECTION INTRAVENOUS; SUBCUTANEOUS at 05:26

## 2018-02-07 RX ADMIN — CHLORHEXIDINE GLUCONATE 15 MILLILITER(S): 213 SOLUTION TOPICAL at 18:30

## 2018-02-07 NOTE — PROGRESS NOTE ADULT - SUBJECTIVE AND OBJECTIVE BOX
Patient is a 27y old  Female who presents with a chief complaint of     Diagnosis:  OVERDOSE      HOSPITAL DAY NO: 13    Vital Signs Last 24 Hrs  T(C): 37.7 (07 Feb 2018 07:05), Max: 38.7 (07 Feb 2018 02:44)  T(F): 99.8 (07 Feb 2018 07:05), Max: 101.7 (07 Feb 2018 02:44)  HR: 109 (07 Feb 2018 07:05) (103 - 113)  BP: 123/60 (07 Feb 2018 07:05) (123/60 - 135/63)  BP(mean): 104 (07 Feb 2018 07:05) (89 - 104)  RR: 20 (07 Feb 2018 07:05) (17 - 20)  SpO2: 98% (07 Feb 2018 00:05) (98% - 99%)    use of pressors: Y ___  N __x__  Use of sedation : x    Vent dependent: Y__x__  N ____    Mode: AC/ CMV (Assist Control/ Continuous Mandatory Ventilation)  RR (machine): 16  TV (machine): 450  FiO2: 30  PEEP: 5  ITime: 1  MAP: 8  PIP: 18                                          Weaning time: 14 hours yesterday    Significant exam findings:   MS: opens eyes, doesnt follow  commands  CHEST: b/l bs   ABDOMEN: soft   SKIN: intact     No of BMs: 1    Nutrition:                 x __PEG __OG __NG  ___ORAL   ___TPN      LABS:                          10.0   7.40  )-----------( 334      ( 06 Feb 2018 22:32 )             28.8                                               02-06    135  |  102  |  13  ----------------------------<  111<H>  3.8   |  25  |  0.5<L>    Ca    9.0      06 Feb 2018 22:32                                                                                                                                            MEDICATIONS  (STANDING):  chlorhexidine 0.12% Liquid 15 milliLiter(s) Swish and Spit two times a day  heparin  Injectable 5000 Unit(s) SubCutaneous every 8 hours  pantoprazole   Suspension 40 milliGRAM(s) Oral before breakfast    MEDICATIONS  (PRN):  acetaminophen    Suspension 650 milliGRAM(s) Oral every 4 hours PRN For Temp greater than 38 C (100.4 F)

## 2018-02-08 RX ORDER — DOCUSATE SODIUM 100 MG
100 CAPSULE ORAL DAILY
Qty: 0 | Refills: 0 | Status: DISCONTINUED | OUTPATIENT
Start: 2018-02-08 | End: 2018-02-17

## 2018-02-08 RX ORDER — SENNA PLUS 8.6 MG/1
2 TABLET ORAL AT BEDTIME
Qty: 0 | Refills: 0 | Status: DISCONTINUED | OUTPATIENT
Start: 2018-02-08 | End: 2018-02-19

## 2018-02-08 RX ADMIN — CHLORHEXIDINE GLUCONATE 15 MILLILITER(S): 213 SOLUTION TOPICAL at 17:16

## 2018-02-08 RX ADMIN — CHLORHEXIDINE GLUCONATE 15 MILLILITER(S): 213 SOLUTION TOPICAL at 05:32

## 2018-02-08 RX ADMIN — Medication 100 MILLIGRAM(S): at 14:50

## 2018-02-08 RX ADMIN — HEPARIN SODIUM 5000 UNIT(S): 5000 INJECTION INTRAVENOUS; SUBCUTANEOUS at 21:56

## 2018-02-08 RX ADMIN — HEPARIN SODIUM 5000 UNIT(S): 5000 INJECTION INTRAVENOUS; SUBCUTANEOUS at 14:49

## 2018-02-08 RX ADMIN — SENNA PLUS 2 TABLET(S): 8.6 TABLET ORAL at 21:56

## 2018-02-08 RX ADMIN — PANTOPRAZOLE SODIUM 40 MILLIGRAM(S): 20 TABLET, DELAYED RELEASE ORAL at 06:22

## 2018-02-08 RX ADMIN — HEPARIN SODIUM 5000 UNIT(S): 5000 INJECTION INTRAVENOUS; SUBCUTANEOUS at 05:31

## 2018-02-08 RX ADMIN — Medication 650 MILLIGRAM(S): at 04:02

## 2018-02-08 RX ADMIN — Medication 650 MILLIGRAM(S): at 15:26

## 2018-02-08 NOTE — PROGRESS NOTE ADULT - SUBJECTIVE AND OBJECTIVE BOX
Patient is a 27y old  Female who presents with a chief complaint of   OVERDOSE  ^OVERDOSE  No h/o HF  Unknown h/o HF  Handoff  MEWS Score  Dysphagia  Respiratory failure  Dysphagia  Respiratory failure  Encephalopathy  Drug overdose, undetermined intent, initial encounter  Acute respiratory failure, unspecified whether with hypoxia or hypercapnia  Tracheostomy  PEG (percutaneous endoscopic gastrostomy)  OVERDOSE [Active]  Dysphagia [Active]  Respiratory failure [Active]  Dysphagia [Active]  Respiratory failure [Active]  Encephalopathy [Active]  Drug overdose, undetermined intent, initial encounter [Active]  Acute respiratory failure, unspecified whether with hypoxia or hypercapnia [Active]  Tracheostomy [Active]  PEG (percutaneous endoscopic gastrostomy) [Active]          HOSPITAL DAY NO: 14      Vital Signs Last 24 Hrs  T(C): 37.8 (08 Feb 2018 07:05), Max: 38.4 (07 Feb 2018 23:38)  T(F): 100 (08 Feb 2018 07:05), Max: 101.1 (07 Feb 2018 23:38)  HR: 104 (08 Feb 2018 07:05) (101 - 123)  BP: 125/58 (08 Feb 2018 07:05) (123/67 - 145/72)  BP(mean): 83 (08 Feb 2018 07:05) (83 - 83)  RR: 18 (08 Feb 2018 07:05) (18 - 20)  SpO2: 98% (08 Feb 2018 00:56) (98% - 99%)    use of pressors: Y ___  N ___x_    use of sedation: Y ___  N __x__    Vent dependent: Y____x  N ____    Mode: standby                                          Weaning time: 24 hours off     Significant exam findings:   MS: unchanged   CHEST: B/L breath sounds   ABDOMEN: soft   HEART:S1/S2 regular  SKIN: unchanged     No of BMs: 0        Nutrition: peg                    02-07-18 @ 07:01  -  02-08-18 @ 07:00  --------------------------------------------------------  IN:    Osmolite: 240 mL    Osmolite: 720 mL  Total IN: 960 mL          LABS:                          10.0   7.40  )-----------( 334      ( 06 Feb 2018 22:32 )             28.8                                               02-06    135  |  102  |  13  ----------------------------<  111<H>  3.8   |  25  |  0.5<L>    Ca    9.0      06 Feb 2018 22:32                                                                                                                                            MEDICATIONS  (STANDING):  chlorhexidine 0.12% Liquid 15 milliLiter(s) Swish and Spit two times a day  heparin  Injectable 5000 Unit(s) SubCutaneous every 8 hours  pantoprazole   Suspension 40 milliGRAM(s) Oral before breakfast    MEDICATIONS  (PRN):  acetaminophen    Suspension 650 milliGRAM(s) Oral every 4 hours PRN For Temp greater than 38 C (100.4 F)          Case discussed with staff and family at the bedside

## 2018-02-09 RX ADMIN — HEPARIN SODIUM 5000 UNIT(S): 5000 INJECTION INTRAVENOUS; SUBCUTANEOUS at 15:26

## 2018-02-09 RX ADMIN — Medication 100 MILLIGRAM(S): at 12:09

## 2018-02-09 RX ADMIN — SENNA PLUS 2 TABLET(S): 8.6 TABLET ORAL at 21:28

## 2018-02-09 RX ADMIN — HEPARIN SODIUM 5000 UNIT(S): 5000 INJECTION INTRAVENOUS; SUBCUTANEOUS at 05:08

## 2018-02-09 RX ADMIN — Medication 650 MILLIGRAM(S): at 21:26

## 2018-02-09 RX ADMIN — HEPARIN SODIUM 5000 UNIT(S): 5000 INJECTION INTRAVENOUS; SUBCUTANEOUS at 21:27

## 2018-02-09 RX ADMIN — CHLORHEXIDINE GLUCONATE 15 MILLILITER(S): 213 SOLUTION TOPICAL at 05:08

## 2018-02-09 RX ADMIN — PANTOPRAZOLE SODIUM 40 MILLIGRAM(S): 20 TABLET, DELAYED RELEASE ORAL at 06:19

## 2018-02-09 NOTE — CONSULT NOTE ADULT - SUBJECTIVE AND OBJECTIVE BOX
LIZETHMICHELLE CATHERINE  27y, Female  Allergy: No Known Allergies      HPI:    FAMILY HISTORY:    PAST MEDICAL & SURGICAL HISTORY:  HPI: 26 yo F found unresponsive at home. Per patient's mother, patient was seen in her bedroom night PTP with no complaints. On day of presentation was found in her bedroom with cyanotic lips and barely breathing. Dilaudid, morphine, and Robitussin was found in patient's bedroom. Patient was not prescribed any narcotics. She works as a nurse at [employer]. Received 6 of Narcan in field, 4 in ED, with no response. Intubated for hypercapnea. Downgraded from ICU to vent unit on trach, PEG      VITALS:  T(F): 100.1, Max: 100.8 (02-08-18 @ 23:30)  HR: 121  BP: 123/71  RR: 20Vital Signs Last 24 Hrs  T(C): 37.8 (09 Feb 2018 08:40), Max: 38.2 (08 Feb 2018 15:31)  T(F): 100.1 (09 Feb 2018 08:40), Max: 100.8 (08 Feb 2018 23:30)  HR: 121 (09 Feb 2018 09:50) (91 - 121)  BP: 123/71 (09 Feb 2018 08:40) (118/61 - 123/73)  BP(mean): 92 (09 Feb 2018 08:40) (82 - 92)  RR: 20 (09 Feb 2018 08:40) (20 - 20)  SpO2: 95% (09 Feb 2018 09:50) (95% - 98%)    TESTS & MEASUREMENTS:                    RADIOLOGY & ADDITIONAL TESTS:    ANTIBIOTICS:

## 2018-02-09 NOTE — CONSULT NOTE ADULT - SUBJECTIVE AND OBJECTIVE BOX
AMERICOMICHELLE  27y, Female  Allergy: No Known Allergies      HPI: 28 yo F found unresponsive at home. Per patient's mother, patient was seen in her bedroom night PTP with no complaints. On day of presentation was found in her bedroom with cyanotic lips and barely breathing. Dilaudid, morphine, and Robitussin was found in patient's bedroom. Patient was not prescribed any narcotics. 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 Intubated for hypercapnea. Downgraded from ICU to vent unit on trach, PEG 2/2/18.    FAMILY HISTORY: NC    PAST MEDICAL & SURGICAL HISTORY:    Asperger's    VITALS:  T(F): 100.1, Max: 100.8 (02-08-18 @ 23:30)  HR: 91  BP: 123/71  RR: 20Vital Signs Last 24 Hrs  T(C): 37.8 (09 Feb 2018 08:40), Max: 38.2 (08 Feb 2018 15:31)  T(F): 100.1 (09 Feb 2018 08:40), Max: 100.8 (08 Feb 2018 23:30)  HR: 91 (09 Feb 2018 08:40) (91 - 109)  BP: 123/71 (09 Feb 2018 08:40) (118/61 - 123/73)  BP(mean): 92 (09 Feb 2018 08:40) (82 - 92)  RR: 20 (09 Feb 2018 08:40) (20 - 20)  SpO2: 98% (09 Feb 2018 00:42) (98% - 98%)    TESTS & MEASUREMENTS:    Complete Blood Count (02.06.18 @ 22:32)    Nucleated RBC: 0 /100 WBCs    WBC Count: 7.40 K/uL    RBC Count: 3.20 M/uL    Hemoglobin: 10.0 g/dL    Hematocrit: 28.8 %    Mean Cell Volume: 90.0 fL    Mean Cell Hemoglobin: 31.3 pg    Mean Cell Hemoglobin Conc: 34.7 g/dL    Red Cell Distrib Width: 13.2 %    Platelet Count - Automated: 334 K/uL    Basic Metabolic Panel (02.06.18 @ 22:32)    Sodium, Serum: 135 mmol/L    Potassium, Serum: 3.8 mmol/L    Chloride, Serum: 102 mmol/L    Carbon Dioxide, Serum: 25 mmol/L    Anion Gap, Serum: 8 mmol/L    Blood Urea Nitrogen, Serum: 13 mg/dL    Creatinine, Serum: 0.5 mg/dL    Glucose, Serum: 111 mg/dL    Calcium, Total Serum: 9.0 mg/dL    BCx 1/26: No growth  UCx 1/25: E. Coli  CSF Gram Stain, Culture 1/26: No growth  Flu, RVP 1/26: negative  CSF West Nile, HSV, Cryptococcus, TB, VDRL 1/26: negative     RADIOLOGY & ADDITIONAL TESTS:  CXR 2/5: Since prior, interval removal of enteric tube. Stable left retrocardiac opacity consistent with atelectasis  CXR 2/4: Improved bilateral opacities    ANTIBIOTICS:    none AMERICOMICHELLE  27y, Female  Allergy: No Known Allergies      HPI: 28 yo F found unresponsive at home. Per patient's mother, patient was seen in her bedroom night PTP with no complaints. On day of presentation was found in her bedroom with cyanotic lips and barely breathing. Dilaudid, morphine, and Robitussin was found in patient's bedroom. Patient was not prescribed any narcotics. 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 Intubated for hypercapnea. Downgraded from ICU to vent unit on trach, PEG 2/2/18.    Per mother, patient has had persistent fevers overnight 101 for past few days after stopping antibiotics.    FAMILY HISTORY: NC    PAST MEDICAL & SURGICAL HISTORY:    Asperger's    VITALS:  T(F): 100.1, Max: 100.8 (02-08-18 @ 23:30)  HR: 91  BP: 123/71  RR: 20Vital Signs Last 24 Hrs  T(C): 37.8 (09 Feb 2018 08:40), Max: 38.2 (08 Feb 2018 15:31)  T(F): 100.1 (09 Feb 2018 08:40), Max: 100.8 (08 Feb 2018 23:30)  HR: 91 (09 Feb 2018 08:40) (91 - 109)  BP: 123/71 (09 Feb 2018 08:40) (118/61 - 123/73)  BP(mean): 92 (09 Feb 2018 08:40) (82 - 92)  RR: 20 (09 Feb 2018 08:40) (20 - 20)  SpO2: 98% (09 Feb 2018 00:42) (98% - 98%)    PHYSICAL EXAM:    General: Lying in bed comfortably, trach, PEG  HEENT: EOMI  CV: S1/S2 wnl, RRR, no m/r/g  Lungs: some scattered rhonchi  Abd: soft, NT/ND, normal bowel sounds  Ext: no c/c/e, 2+ peripheral pulses  Neuro: no focal weakness    TESTS & MEASUREMENTS:    Complete Blood Count (02.06.18 @ 22:32)    Nucleated RBC: 0 /100 WBCs    WBC Count: 7.40 K/uL    RBC Count: 3.20 M/uL    Hemoglobin: 10.0 g/dL    Hematocrit: 28.8 %    Mean Cell Volume: 90.0 fL    Mean Cell Hemoglobin: 31.3 pg    Mean Cell Hemoglobin Conc: 34.7 g/dL    Red Cell Distrib Width: 13.2 %    Platelet Count - Automated: 334 K/uL    Basic Metabolic Panel (02.06.18 @ 22:32)    Sodium, Serum: 135 mmol/L    Potassium, Serum: 3.8 mmol/L    Chloride, Serum: 102 mmol/L    Carbon Dioxide, Serum: 25 mmol/L    Anion Gap, Serum: 8 mmol/L    Blood Urea Nitrogen, Serum: 13 mg/dL    Creatinine, Serum: 0.5 mg/dL    Glucose, Serum: 111 mg/dL    Calcium, Total Serum: 9.0 mg/dL    BCx 1/26: No growth  UCx 1/25: E. Coli  CSF Gram Stain, Culture 1/26: No growth  Flu, RVP 1/26: negative  CSF West Nile, HSV, Cryptococcus, TB, VDRL 1/26: negative     RADIOLOGY & ADDITIONAL TESTS:  CXR 2/5: Since prior, interval removal of enteric tube. Stable left retrocardiac opacity consistent with atelectasis  CXR 2/4: Improved bilateral opacities    ANTIBIOTICS:    none

## 2018-02-09 NOTE — PROGRESS NOTE ADULT - SUBJECTIVE AND OBJECTIVE BOX
Patient is a 27y old  Female who presents with a chief complaint of   OVERDOSE  ^OVERDOSE  No h/o HF  Unknown h/o HF  Handoff  MEWS Score  Dysphagia  Respiratory failure  Dysphagia  Respiratory failure  Encephalopathy  Drug overdose, undetermined intent, initial encounter  Acute respiratory failure, unspecified whether with hypoxia or hypercapnia  Tracheostomy  PEG (percutaneous endoscopic gastrostomy)  OVERDOSE [Active]  Dysphagia [Active]  Respiratory failure [Active]  Dysphagia [Active]  Respiratory failure [Active]  Encephalopathy [Active]  Drug overdose, undetermined intent, initial encounter [Active]  Acute respiratory failure, unspecified whether with hypoxia or hypercapnia [Active]  Tracheostomy [Active]  PEG (percutaneous endoscopic gastrostomy) [Active]          HOSPITAL DAY NO: 15      Vital Signs Last 24 Hrs  T(C): 37.8 (09 Feb 2018 08:40), Max: 38.2 (08 Feb 2018 15:31)  T(F): 100.1 (09 Feb 2018 08:40), Max: 100.8 (08 Feb 2018 23:30)  HR: 91 (09 Feb 2018 08:40) (91 - 109)  BP: 123/71 (09 Feb 2018 08:40) (118/61 - 123/73)  BP(mean): 92 (09 Feb 2018 08:40) (82 - 92)  RR: 20 (09 Feb 2018 08:40) (20 - 20)  SpO2: 98% (09 Feb 2018 00:42) (98% - 98%)    use of pressors: Y ___  N x____    use of sedation: Y ___  N _x___    Vent dependent: Y____  N ____    Mode: standby  FiO2: 30                                          Weaning time: 24 hrs    Significant exam findings:   MS: no chg  CHEST: B/L breath sounds   ABDOMEN: soft   HEART:S1/S2 regular  SKIN: no chg    No of BMs: 1  OUT: 0 mL/kg/d        Nutrition: peg                   02-08-18 @ 07:01  -  02-09-18 @ 07:00  --------------------------------------------------------  IN:    Osmolite: 960 mL  Total IN: 960 mL          LABS:                                                                                                                                                                                            MEDICATIONS  (STANDING):  chlorhexidine 0.12% Liquid 15 milliLiter(s) Swish and Spit two times a day  docusate sodium 100 milliGRAM(s) Oral daily  heparin  Injectable 5000 Unit(s) SubCutaneous every 8 hours  pantoprazole   Suspension 40 milliGRAM(s) Oral before breakfast  senna 2 Tablet(s) Oral at bedtime    MEDICATIONS  (PRN):  acetaminophen    Suspension 650 milliGRAM(s) Oral every 4 hours PRN For Temp greater than 38 C (100.4 F)          Case discussed with staff and family at the bedside

## 2018-02-10 LAB
APPEARANCE UR: (no result)
BACTERIA # UR AUTO: (no result) /HPF
BILIRUB UR-MCNC: NEGATIVE — SIGNIFICANT CHANGE UP
COLOR SPEC: YELLOW — SIGNIFICANT CHANGE UP
DIFF PNL FLD: NEGATIVE — SIGNIFICANT CHANGE UP
EPI CELLS # UR: (no result) /HPF
GLUCOSE UR QL: NEGATIVE MG/DL — SIGNIFICANT CHANGE UP
KETONES UR-MCNC: NEGATIVE — SIGNIFICANT CHANGE UP
LEUKOCYTE ESTERASE UR-ACNC: (no result)
NITRITE UR-MCNC: POSITIVE
PH UR: 6.5 — SIGNIFICANT CHANGE UP (ref 5–8)
PROT UR-MCNC: (no result) MG/DL
SP GR SPEC: 1.02 — SIGNIFICANT CHANGE UP (ref 1.01–1.03)
UROBILINOGEN FLD QL: 1 MG/DL (ref 0.2–0.2)
WBC UR QL: (no result) /HPF

## 2018-02-10 RX ADMIN — PANTOPRAZOLE SODIUM 40 MILLIGRAM(S): 20 TABLET, DELAYED RELEASE ORAL at 08:07

## 2018-02-10 RX ADMIN — Medication 100 MILLIGRAM(S): at 12:05

## 2018-02-10 RX ADMIN — CHLORHEXIDINE GLUCONATE 15 MILLILITER(S): 213 SOLUTION TOPICAL at 05:19

## 2018-02-10 RX ADMIN — SENNA PLUS 2 TABLET(S): 8.6 TABLET ORAL at 21:37

## 2018-02-10 RX ADMIN — HEPARIN SODIUM 5000 UNIT(S): 5000 INJECTION INTRAVENOUS; SUBCUTANEOUS at 05:18

## 2018-02-10 RX ADMIN — HEPARIN SODIUM 5000 UNIT(S): 5000 INJECTION INTRAVENOUS; SUBCUTANEOUS at 21:36

## 2018-02-10 RX ADMIN — HEPARIN SODIUM 5000 UNIT(S): 5000 INJECTION INTRAVENOUS; SUBCUTANEOUS at 13:34

## 2018-02-10 RX ADMIN — CHLORHEXIDINE GLUCONATE 15 MILLILITER(S): 213 SOLUTION TOPICAL at 17:07

## 2018-02-10 NOTE — PROGRESS NOTE ADULT - SUBJECTIVE AND OBJECTIVE BOX
Patient is a 27y old  Female who presents with a chief complaint of   OVERDOSE  ^OVERDOSE  No h/o HF  Unknown h/o HF  Handoff  MEWS Score  Dysphagia  Respiratory failure  Dysphagia  Respiratory failure  Encephalopathy  Drug overdose, undetermined intent, initial encounter  Acute respiratory failure, unspecified whether with hypoxia or hypercapnia  Tracheostomy  PEG (percutaneous endoscopic gastrostomy)  OVERDOSE [Active]  Dysphagia [Active]  Respiratory failure [Active]  Dysphagia [Active]  Respiratory failure [Active]  Encephalopathy [Active]  Drug overdose, undetermined intent, initial encounter [Active]  Acute respiratory failure, unspecified whether with hypoxia or hypercapnia [Active]  Tracheostomy [Active]  PEG (percutaneous endoscopic gastrostomy) [Active]          HOSPITAL DAY NO: 16      Vital Signs Last 24 Hrs  T(C): 37.7 (10 Feb 2018 08:23), Max: 38.4 (09 Feb 2018 15:00)  T(F): 99.8 (10 Feb 2018 08:23), Max: 101.1 (09 Feb 2018 15:00)  HR: 114 (10 Feb 2018 08:23) (105 - 119)  BP: 129/82 (10 Feb 2018 08:23) (125/76 - 129/82)  BP(mean): 105 (09 Feb 2018 23:46) (96 - 105)  RR: 20 (09 Feb 2018 23:46) (20 - 20)  SpO2: 97% (10 Feb 2018 08:02) (97% - 97%)    use of pressors: Y ___  N __x__    use of sedation: Y ___  N __x__    Vent dependent: Y____  N _x___    Mode: standby                                          Weaning time: 72 hours off     Significant exam findings:   MS: unchanged   CHEST: B/L breath sounds   ABDOMEN: soft   HEART:S1/S2 regular  SKIN: unchagned     No of BMs: none      Nutrition: PEG                    02-09-18 @ 07:01  -  02-10-18 @ 07:00  --------------------------------------------------------  IN:    Osmolite: 960 mL  Total IN: 960 mL          LABS:                                                                                                                                                                                            MEDICATIONS  (STANDING):  chlorhexidine 0.12% Liquid 15 milliLiter(s) Swish and Spit two times a day  docusate sodium 100 milliGRAM(s) Oral daily  heparin  Injectable 5000 Unit(s) SubCutaneous every 8 hours  pantoprazole   Suspension 40 milliGRAM(s) Oral before breakfast  senna 2 Tablet(s) Oral at bedtime    MEDICATIONS  (PRN):  acetaminophen    Suspension 650 milliGRAM(s) Oral every 4 hours PRN For Temp greater than 38 C (100.4 F)          Case discussed with staff and family at the bedside

## 2018-02-10 NOTE — PROGRESS NOTE ADULT - ASSESSMENT
overall poor prognosis AHRF OD    Recommendations:    Transfer to floor   Trach care and pulmonary toilet  DVT prophylaxis

## 2018-02-11 RX ADMIN — PANTOPRAZOLE SODIUM 40 MILLIGRAM(S): 20 TABLET, DELAYED RELEASE ORAL at 07:09

## 2018-02-11 RX ADMIN — Medication 650 MILLIGRAM(S): at 00:27

## 2018-02-11 RX ADMIN — HEPARIN SODIUM 5000 UNIT(S): 5000 INJECTION INTRAVENOUS; SUBCUTANEOUS at 07:08

## 2018-02-11 RX ADMIN — Medication 100 MILLIGRAM(S): at 11:26

## 2018-02-11 RX ADMIN — SENNA PLUS 2 TABLET(S): 8.6 TABLET ORAL at 21:55

## 2018-02-11 RX ADMIN — CHLORHEXIDINE GLUCONATE 15 MILLILITER(S): 213 SOLUTION TOPICAL at 18:02

## 2018-02-11 RX ADMIN — Medication 650 MILLIGRAM(S): at 21:56

## 2018-02-11 RX ADMIN — HEPARIN SODIUM 5000 UNIT(S): 5000 INJECTION INTRAVENOUS; SUBCUTANEOUS at 13:08

## 2018-02-11 RX ADMIN — CHLORHEXIDINE GLUCONATE 15 MILLILITER(S): 213 SOLUTION TOPICAL at 07:07

## 2018-02-11 RX ADMIN — HEPARIN SODIUM 5000 UNIT(S): 5000 INJECTION INTRAVENOUS; SUBCUTANEOUS at 21:55

## 2018-02-11 NOTE — PROGRESS NOTE ADULT - SUBJECTIVE AND OBJECTIVE BOX
Patient is a 27y old  Female who presents with a chief complaint of   OVERDOSE    Respiratory failure    Tracheostomy    PEG (percutaneous endoscopic gastrostomy)        HOSPITAL DAY NO: 17      Vital Signs Last 24 Hrs        T(C): 37.3 (02-11-18 @ 07:00), Max: 37.9 (02-10-18 @ 15:00)  HR: 117 (02-11-18 @ 07:00) (92 - 117)  BP: 151/94 (02-11-18 @ 07:00) (111/71 - 151/94)  RR: 20 (02-11-18 @ 07:00) (18 - 20)  SpO2: 99% (02-11-18 @ 01:41) (98% - 99%)            use of pressors: Y ___  N __x__    use of sedation: Y ___  N __x__    Vent dependent: Y____  N _x___    Mode: standby                                          Weaning time: 72 hours off     Significant exam findings:   MS: unchanged   CHEST: B/L breath sounds   ABDOMEN: soft   HEART:S1/S2 regular  SKIN: unchagned     No of BMs: none      Nutrition: PEG                      02-10-18 @ 07:01  -  02-11-18 @ 07:00  --------------------------------------------------------  IN: 1360 mL / OUT: 1 mL / NET: 1359 mL            Urine Microscopic-Add On (NC) (02.10.18 @ 09:57)    Bacteria: Many /HPF    Epithelial Cells: Moderate /HPF    White Blood Cell - Urine: 6-10 /HPF                                                            MEDICATIONS  (STANDING):  chlorhexidine 0.12% Liquid 15 milliLiter(s) Swish and Spit two times a day  docusate sodium 100 milliGRAM(s) Oral daily  heparin  Injectable 5000 Unit(s) SubCutaneous every 8 hours  pantoprazole   Suspension 40 milliGRAM(s) Oral before breakfast  senna 2 Tablet(s) Oral at bedtime    MEDICATIONS  (PRN):  acetaminophen    Suspension 650 milliGRAM(s) Oral every 4 hours PRN For Temp greater than 38 C (100.4 F)          Case discussed with staff and family at the bedside Patient is a 27y old  Female who presents with a chief complaint of   OVERDOSE    Respiratory failure    Tracheostomy    PEG (percutaneous endoscopic gastrostomy)        HOSPITAL DAY NO: 17      Vital Signs Last 24 Hrs        T(C): 37.3 (02-11-18 @ 07:00), Max: 37.9 (02-10-18 @ 15:00)  HR: 117 (02-11-18 @ 07:00) (92 - 117)  BP: 151/94 (02-11-18 @ 07:00) (111/71 - 151/94)  RR: 20 (02-11-18 @ 07:00) (18 - 20)  SpO2: 99% (02-11-18 @ 01:41) (98% - 99%)  MAP: 117            use of pressors: Y ___  N __x__    use of sedation: Y ___  N __x__    Vent dependent: Y____  N _x___    Mode: standby                                          Weaning time: 4 days off    30 % sat 99%     Significant exam findings:   MS: unchanged   CHEST: B/L breath sounds   ABDOMEN: soft   HEART:S1/S2 regular  SKIN: unchagned     No of BMs: none      Nutrition: PEG                      02-10-18 @ 07:01  -  02-11-18 @ 07:00  --------------------------------------------------------  IN: 1360 mL / OUT: 1 mL / NET: 1359 mL            Urine Microscopic-Add On (NC) (02.10.18 @ 09:57)    Bacteria: Many /HPF    Epithelial Cells: Moderate /HPF    White Blood Cell - Urine: 6-10 /HPF                                                            MEDICATIONS  (STANDING):  chlorhexidine 0.12% Liquid 15 milliLiter(s) Swish and Spit two times a day  docusate sodium 100 milliGRAM(s) Oral daily  heparin  Injectable 5000 Unit(s) SubCutaneous every 8 hours  pantoprazole   Suspension 40 milliGRAM(s) Oral before breakfast  senna 2 Tablet(s) Oral at bedtime    MEDICATIONS  (PRN):  acetaminophen    Suspension 650 milliGRAM(s) Oral every 4 hours PRN For Temp greater than 38 C (100.4 F)          Case discussed with staff and family at the bedside

## 2018-02-11 NOTE — PROGRESS NOTE ADULT - ASSESSMENT
AHRF OD    Recommendations:    Transfer to floor   Trach care and pulmonary toilet  DVT prophylaxis AHRF OD    Recommendations:    wean off oxygen  Trach care and pulmonary toilet  DVT prophylaxis

## 2018-02-12 DIAGNOSIS — N30.00 ACUTE CYSTITIS WITHOUT HEMATURIA: ICD-10-CM

## 2018-02-12 RX ORDER — CEFEPIME 1 G/1
1000 INJECTION, POWDER, FOR SOLUTION INTRAMUSCULAR; INTRAVENOUS EVERY 12 HOURS
Qty: 0 | Refills: 0 | Status: DISCONTINUED | OUTPATIENT
Start: 2018-02-12 | End: 2018-02-13

## 2018-02-12 RX ADMIN — CHLORHEXIDINE GLUCONATE 15 MILLILITER(S): 213 SOLUTION TOPICAL at 18:35

## 2018-02-12 RX ADMIN — PANTOPRAZOLE SODIUM 40 MILLIGRAM(S): 20 TABLET, DELAYED RELEASE ORAL at 09:41

## 2018-02-12 RX ADMIN — HEPARIN SODIUM 5000 UNIT(S): 5000 INJECTION INTRAVENOUS; SUBCUTANEOUS at 13:36

## 2018-02-12 RX ADMIN — CHLORHEXIDINE GLUCONATE 15 MILLILITER(S): 213 SOLUTION TOPICAL at 05:30

## 2018-02-12 RX ADMIN — HEPARIN SODIUM 5000 UNIT(S): 5000 INJECTION INTRAVENOUS; SUBCUTANEOUS at 21:19

## 2018-02-12 RX ADMIN — Medication 650 MILLIGRAM(S): at 21:44

## 2018-02-12 RX ADMIN — HEPARIN SODIUM 5000 UNIT(S): 5000 INJECTION INTRAVENOUS; SUBCUTANEOUS at 05:30

## 2018-02-12 RX ADMIN — Medication 650 MILLIGRAM(S): at 05:31

## 2018-02-12 RX ADMIN — SENNA PLUS 2 TABLET(S): 8.6 TABLET ORAL at 21:19

## 2018-02-12 NOTE — PROGRESS NOTE ADULT - SUBJECTIVE AND OBJECTIVE BOX
Patient is a 27y old  Female who presents with a chief complaint of   OVERDOSE  ^OVERDOSE  No h/o HF  Unknown h/o HF  Handoff  MEWS Score  Dysphagia  Respiratory failure  Dysphagia  Respiratory failure  Encephalopathy  Drug overdose, undetermined intent, initial encounter  Acute respiratory failure, unspecified whether with hypoxia or hypercapnia  Tracheostomy  PEG (percutaneous endoscopic gastrostomy)  OVERDOSE [Active]  Dysphagia [Active]  Respiratory failure [Active]  Dysphagia [Active]  Respiratory failure [Active]  Encephalopathy [Active]  Drug overdose, undetermined intent, initial encounter [Active]  Acute respiratory failure, unspecified whether with hypoxia or hypercapnia [Active]  Tracheostomy [Active]  PEG (percutaneous endoscopic gastrostomy) [Active]          HOSPITAL DAY NO:       Vital Signs Last 24 Hrs  T(C): 38.1 (12 Feb 2018 10:28), Max: 38.7 (12 Feb 2018 07:49)  T(F): 100.6 (12 Feb 2018 10:28), Max: 101.6 (12 Feb 2018 07:49)  HR: 97 (12 Feb 2018 08:45) (97 - 111)  BP: 132/79 (12 Feb 2018 07:49) (113/69 - 132/79)  BP(mean):   RR: 20 (12 Feb 2018 07:49) (18 - 20)  SpO2: 99% (12 Feb 2018 08:45) (98% - 99%)    use of pressors: Y ___  N __x__    use of sedation: Y ___  N _xx___    Vent dependent: Y____  N __x_    Mode: standby  FiO2: 30                                          Weaning time:  5days off vent    Significant exam findings:   MS: no change  CHEST: B/L breath sounds   ABDOMEN: soft   HEART:S1/S2 regular  SKIN: intact    No of BMs: 0      Nutrition: peg                   02-11-18 @ 07:01  -  02-12-18 @ 07:00  --------------------------------------------------------  IN:    Osmolite: 480 mL  Total IN: 480 mL          LABS:                                                  wbc  8                                                                                         Culture - Urine (collected 10 Feb 2018 10:58)  Source: .Urine Clean Catch (Midstream)  Preliminary Report (11 Feb 2018 18:05):    >100,000 CFU/ml Escherichia coli                                                       MEDICATIONS  (STANDING):  chlorhexidine 0.12% Liquid 15 milliLiter(s) Swish and Spit two times a day  docusate sodium 100 milliGRAM(s) Oral daily  heparin  Injectable 5000 Unit(s) SubCutaneous every 8 hours  pantoprazole   Suspension 40 milliGRAM(s) Oral before breakfast  senna 2 Tablet(s) Oral at bedtime    MEDICATIONS  (PRN):  acetaminophen    Suspension 650 milliGRAM(s) Oral every 4 hours PRN For Temp greater than 38 C (100.4 F)          Case discussed with staff and family at the bedside

## 2018-02-13 LAB
ANION GAP SERPL CALC-SCNC: 10 MMOL/L — SIGNIFICANT CHANGE UP (ref 7–14)
BUN SERPL-MCNC: 21 MG/DL — HIGH (ref 10–20)
CALCIUM SERPL-MCNC: 9.7 MG/DL — SIGNIFICANT CHANGE UP (ref 8.5–10.1)
CHLORIDE SERPL-SCNC: 100 MMOL/L — SIGNIFICANT CHANGE UP (ref 98–110)
CO2 SERPL-SCNC: 26 MMOL/L — SIGNIFICANT CHANGE UP (ref 17–32)
CREAT SERPL-MCNC: 0.4 MG/DL — LOW (ref 0.7–1.5)
GLUCOSE SERPL-MCNC: 156 MG/DL — HIGH (ref 70–110)
HCT VFR BLD CALC: 32.5 % — LOW (ref 37–47)
HGB BLD-MCNC: 11.1 G/DL — LOW (ref 14–18)
MCHC RBC-ENTMCNC: 31.4 PG — HIGH (ref 27–31)
MCHC RBC-ENTMCNC: 34.2 G/DL — SIGNIFICANT CHANGE UP (ref 32–37)
MCV RBC AUTO: 91.8 FL — HIGH (ref 81–91)
NRBC # BLD: 0 /100 WBCS — SIGNIFICANT CHANGE UP (ref 0–0)
PLATELET # BLD AUTO: 308 K/UL — SIGNIFICANT CHANGE UP (ref 130–400)
POTASSIUM SERPL-MCNC: 3.6 MMOL/L — SIGNIFICANT CHANGE UP (ref 3.5–5)
POTASSIUM SERPL-SCNC: 3.6 MMOL/L — SIGNIFICANT CHANGE UP (ref 3.5–5)
RBC # BLD: 3.54 M/UL — LOW (ref 4.2–5.4)
RBC # FLD: 12.6 % — SIGNIFICANT CHANGE UP (ref 11.5–14.5)
SODIUM SERPL-SCNC: 136 MMOL/L — SIGNIFICANT CHANGE UP (ref 135–146)
WBC # BLD: 6.26 K/UL — SIGNIFICANT CHANGE UP (ref 4.8–10.8)
WBC # FLD AUTO: 6.26 K/UL — SIGNIFICANT CHANGE UP (ref 4.8–10.8)

## 2018-02-13 RX ADMIN — SENNA PLUS 2 TABLET(S): 8.6 TABLET ORAL at 21:24

## 2018-02-13 RX ADMIN — CEFEPIME 100 MILLIGRAM(S): 1 INJECTION, POWDER, FOR SOLUTION INTRAMUSCULAR; INTRAVENOUS at 05:23

## 2018-02-13 RX ADMIN — HEPARIN SODIUM 5000 UNIT(S): 5000 INJECTION INTRAVENOUS; SUBCUTANEOUS at 05:22

## 2018-02-13 RX ADMIN — HEPARIN SODIUM 5000 UNIT(S): 5000 INJECTION INTRAVENOUS; SUBCUTANEOUS at 19:07

## 2018-02-13 RX ADMIN — CHLORHEXIDINE GLUCONATE 15 MILLILITER(S): 213 SOLUTION TOPICAL at 19:07

## 2018-02-13 RX ADMIN — HEPARIN SODIUM 5000 UNIT(S): 5000 INJECTION INTRAVENOUS; SUBCUTANEOUS at 21:24

## 2018-02-13 RX ADMIN — PANTOPRAZOLE SODIUM 40 MILLIGRAM(S): 20 TABLET, DELAYED RELEASE ORAL at 05:22

## 2018-02-13 RX ADMIN — CHLORHEXIDINE GLUCONATE 15 MILLILITER(S): 213 SOLUTION TOPICAL at 05:22

## 2018-02-13 NOTE — PROGRESS NOTE ADULT - SUBJECTIVE AND OBJECTIVE BOX
MICHELLE DRISCOLL  27y, Female      OVERNIGHT EVENTS:    no fevers, non responsive    VITALS:  T(F): 97.7, Max: 101.5 (02-12-18 @ 23:30)  HR: 90  BP: 122/75  RR: 18Vital Signs Last 24 Hrs  T(C): 36.5 (13 Feb 2018 07:05), Max: 38.6 (12 Feb 2018 23:30)  T(F): 97.7 (13 Feb 2018 07:05), Max: 101.5 (12 Feb 2018 23:30)  HR: 90 (13 Feb 2018 07:05) (90 - 111)  BP: 122/75 (13 Feb 2018 07:05) (122/75 - 140/87)  BP(mean): 94 (13 Feb 2018 07:05) (94 - 111)  RR: 18 (13 Feb 2018 07:05) (18 - 21)  SpO2: 100% (12 Feb 2018 16:53) (100% - 100%)    TESTS & MEASUREMENTS:                        11.1   6.26  )-----------( 308      ( 13 Feb 2018 08:09 )             32.5     02-13    136  |  100  |  21<H>  ----------------------------<  156<H>  3.6   |  26  |  0.4<L>    Ca    9.7      13 Feb 2018 08:09          Culture - Urine (collected 02-10-18 @ 10:58)  Source: .Urine Clean Catch (Midstream)  Final Report (02-12-18 @ 16:54):    >100,000 CFU/ml Escherichia coli  Organism: Escherichia coli (02-12-18 @ 16:54)  Organism: Escherichia coli (02-12-18 @ 16:54)      -  Amikacin: S <=8      -  Ampicillin: R >16      -  Ampicillin/Sulbactam: R 16/8      -  Aztreonam: R >16      -  Cefazolin: R >16      -  Cefepime: R >16      -  Cefoxitin: I 16      -  Ceftazidime: R >16      -  Ceftriaxone: R >32      -  Ciprofloxacin: R >2      -  Ertapenem: S <=0.5      -  Gentamicin: R >8      -  Imipenem: S <=1      -  Levofloxacin: R >4      -  Meropenem: S <=1      -  Nitrofurantoin: S <=32      -  Piperacillin/Tazobactam: R <=8      -  Tobramycin: I 8      -  Trimethoprim/Sulfamethoxazole: R >2/38      Method Type: SHARAN            RADIOLOGY & ADDITIONAL TESTS:    ANTIBIOTICS:  cefepime  IVPB 1000 milliGRAM(s) IV Intermittent every 12 hours

## 2018-02-13 NOTE — PROGRESS NOTE ADULT - ASSESSMENT
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  Her prognosis for significant neurologic recovery is poor.  I discussed most likely outcome with father and brother.  It seems they are planning on   long term care for a few months to look for signs of improvement.  The possibility of her surviving for months to years in a similar state to what she is   in now was discussed.

## 2018-02-13 NOTE — PROGRESS NOTE ADULT - SUBJECTIVE AND OBJECTIVE BOX
CHIEF COMPLAINT:   Patient is a 27y old  Female who presents with a chief complaint of mental status change. She is been treated for encephalopathy likely secondary to Substance  abuse and overdose.   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       HISTORY OF PRESENTING ILLNESS:   Patient is a 27y old  Female who presents with a chief complaint of mental status change. She is been treated for encephalopathy likely secondary to Substance  abuse and overdose.   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     Patient is S/P extubation and currently status trqach and status peg. having persistent fever and treated for UTI also.     VITALS:   T(F): 97.7  HR: 90  BP: 122/75  RR: 18  SpO2: 100%    LABS:                       11.1   6.26  )-----------( 308      ( 13 Feb 2018 08:09 )             32.5     BMP-  - Within normal limit    Basic Metabolic Panel in AM (02.13.18 @ 08:09)    Sodium, Serum: 136 mmol/L    Potassium, Serum: 3.6 mmol/L    Chloride, Serum: 100 mmol/L    Carbon Dioxide, Serum: 26 mmol/L    Anion Gap, Serum: 10 mmol/L    Blood Urea Nitrogen, Serum: 21 mg/dL    Creatinine, Serum: 0.4 mg/dL    Glucose, Serum: 156 mg/dL    Calcium, Total Serum: 9.7 mg/dL    RADIOLOGY: Chest xray - Retro cardiac opacity noted     PHYSICAL EXAM:  GEN: No acute distress  HEENT:  STATUS TRACH   LUNGS: Clear to auscultation bilaterally   HEART: S1/S2 present. RRR.   ABD: Soft, non-tender, non-distended. Bowel sounds present  EXT: No edema and no skin break noted.   NEURO: AAOX no orientation. sedated mostly     ASSESSMENT & PLAN     1. Overdose -  s/p trach and peg.     2. Acute respiratory Failure  - S/P trach now.     3. Fever - UTI - ID appreciated no antibiotic needed at this time . c/w following cultures.     Tylenol rectally round the clock now.     D/W Mother at the bed side and also the house staff.   Trach and peg care.

## 2018-02-13 NOTE — PROGRESS NOTE ADULT - SUBJECTIVE AND OBJECTIVE BOX
Neurology Progress Note    Interval History:    Pt is s/p toxic white matter encephalopathy from drug overdose.  She remains unable to follow commands.      Vital Signs Last 24 Hrs  T(C): 37.7 (13 Feb 2018 23:30), Max: 38.1 (13 Feb 2018 15:30)  T(F): 99.8 (13 Feb 2018 23:30), Max: 100.5 (13 Feb 2018 15:30)  HR: 113 (13 Feb 2018 23:30) (90 - 113)  BP: 130/74 (13 Feb 2018 23:30) (122/75 - 134/79)  BP(mean): 97 (13 Feb 2018 23:30) (94 - 97)  RR: 18 (13 Feb 2018 23:30) (18 - 18)  SpO2: 98% (13 Feb 2018 16:38) (98% - 98%)    Neurological Exam:   Eyes closed but open slightly when father is playing video.  Open widely with painful stimulus to lower extremities.  Some posturing of left arm noted when pressure applied to nailbed of toes.  Facial grimacing - crying appearance when  music is played.  Patient looked toward examiner standing on left when she was initially spoken to.  I did not observe her to follow any commands.  Eyes would look up periodically but this was felt to be part of blink reflex.      Medications:  MEDICATIONS  (STANDING):  chlorhexidine 0.12% Liquid 15 milliLiter(s) Swish and Spit two times a day  docusate sodium 100 milliGRAM(s) Oral daily  heparin  Injectable 5000 Unit(s) SubCutaneous every 8 hours  pantoprazole   Suspension 40 milliGRAM(s) Oral before breakfast  senna 2 Tablet(s) Oral at bedtime    MRI brain reviewed - extensive white matter diffusion changes with sparing of U fibers.    Labs:  CBC Full  -  ( 13 Feb 2018 08:09 )  WBC Count : 6.26 K/uL  Hemoglobin : 11.1 g/dL  Hematocrit : 32.5 %  Platelet Count - Automated : 308 K/uL  Mean Cell Volume : 91.8 fL  Mean Cell Hemoglobin : 31.4 pg  Mean Cell Hemoglobin Concentration : 34.2 g/dL  Auto Neutrophil # : x  Auto Lymphocyte # : x  Auto Monocyte # : x  Auto Eosinophil # : x  Auto Basophil # : x  Auto Neutrophil % : x  Auto Lymphocyte % : x  Auto Monocyte % : x  Auto Eosinophil % : x  Auto Basophil % : x    02-13    136  |  100  |  21<H>  ----------------------------<  156<H>  3.6   |  26  |  0.4<L>    Ca    9.7      13 Feb 2018 08:09

## 2018-02-14 RX ADMIN — CHLORHEXIDINE GLUCONATE 15 MILLILITER(S): 213 SOLUTION TOPICAL at 18:01

## 2018-02-14 RX ADMIN — HEPARIN SODIUM 5000 UNIT(S): 5000 INJECTION INTRAVENOUS; SUBCUTANEOUS at 05:11

## 2018-02-14 RX ADMIN — SENNA PLUS 2 TABLET(S): 8.6 TABLET ORAL at 21:50

## 2018-02-14 RX ADMIN — HEPARIN SODIUM 5000 UNIT(S): 5000 INJECTION INTRAVENOUS; SUBCUTANEOUS at 21:49

## 2018-02-14 RX ADMIN — PANTOPRAZOLE SODIUM 40 MILLIGRAM(S): 20 TABLET, DELAYED RELEASE ORAL at 05:12

## 2018-02-14 RX ADMIN — Medication 100 MILLIGRAM(S): at 12:38

## 2018-02-14 RX ADMIN — Medication 650 MILLIGRAM(S): at 16:48

## 2018-02-14 RX ADMIN — CHLORHEXIDINE GLUCONATE 15 MILLILITER(S): 213 SOLUTION TOPICAL at 05:11

## 2018-02-14 RX ADMIN — HEPARIN SODIUM 5000 UNIT(S): 5000 INJECTION INTRAVENOUS; SUBCUTANEOUS at 13:52

## 2018-02-14 NOTE — CHART NOTE - NSCHARTNOTEFT_GEN_A_CORE
Registered Dietitian Follow-Up    ***Scroll to the bottom for RD recommendation***    Name: MICHELLE DRISCOLL | Age: 27y | Gender: Female | Allergies: No Known Allergies        Patient Profile Reviewed                           Yes [x]   No []  Nutrition History Previously Obtained        Yes [x]  No []           *** Pertinent Medical Interventions:  (1) For Recap- Pt is s/p toxic white matter encephalopathy from drug overdose.  She remains unable to follow commands.    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  Her prognosis for significant neurologic recovery is poor.  I discussed most likely outcome with father and brother.  It seems they are planning on  long term care for a few months to look for signs of improvement.  The possibility of her surviving for months to years in a similar state to what she is in now was discussed.  (3) Per MEDICINE f/u note--- Pt s/p trach and peg. ID recs for abx. No other intervention at this time.      ***Pertinent Subjective Information:  (1) Per RN, patient has been tolerating osmolite 1.5 at 240cc q6hr + Prostat q8hr for the past few days under scheduled feeding.  (2) No new weight documented on EMR. However, measured bedscale to be 72.7kg. V.S. previously documented on 2/5 to be 55kg. V.S. 70.4kg of UBW by family.   Likely bed scale inaccuracy.  UBW will be used  (3) LBM 2/14 documented on EMR.     [id number] kcal/day  71-85g/day   Both remains appropriate      Rec: (1) For longer term G-tube EN feeding, Change formula to Jevity 1.2 at 240cc q4hr. This regimen gives a total of 1720 kcal/79g protein/ 1166 ml free water. Additional water per vent.

## 2018-02-14 NOTE — PROGRESS NOTE ADULT - ASSESSMENT
1. Overdose -  s/p trach and peg.     2. Acute respiratory Failure  - S/P trach now.     3. Fever - UTI - ID appreciated no antibiotic needed at this time . c/w following cultures.     Tylenol rectally round the clock now.     D/W Mother at the bed side and also the house staff.   Trach and peg care.

## 2018-02-14 NOTE — PROGRESS NOTE ADULT - SUBJECTIVE AND OBJECTIVE BOX
SUBJECTIVE:    Patient is a 27y old Female who presents with a chief complaint of loss of consciousness.  Currently admitted to medicine with the primary diagnosis of anoxic brain injury.  Today is hospital day 20d. This morning she is resting comfortably in bed and reports no new issues or overnight events.     PAST MEDICAL & SURGICAL HISTORY  Asperger's    SOCIAL HISTORY:  Negative for smoking/alcohol/drug use.     ALLERGIES:  No Known Allergies    MEDICATIONS:  STANDING MEDICATIONS  chlorhexidine 0.12% Liquid 15 milliLiter(s) Swish and Spit two times a day  docusate sodium 100 milliGRAM(s) Oral daily  heparin  Injectable 5000 Unit(s) SubCutaneous every 8 hours  pantoprazole   Suspension 40 milliGRAM(s) Oral before breakfast  senna 2 Tablet(s) Oral at bedtime    PRN MEDICATIONS  acetaminophen    Suspension 650 milliGRAM(s) Oral every 4 hours PRN    VITALS:   T(F): 100  HR: 116  BP: 121/78  RR: 18  SpO2: 98%    LABS:                        11.1   6.26  )-----------( 308      ( 13 Feb 2018 08:09 )             32.5     02-13    136  |  100  |  21<H>  ----------------------------<  156<H>  3.6   |  26  |  0.4<L>    Ca    9.7      13 Feb 2018 08:09      RADIOLOGY:    CXR 2/5: Since prior, interval removal of enteric tube. Stable left retrocardiac opacity consistent with atelectasis      PHYSICAL EXAM:  GEN: Mechanically ventilated  LUNGS: Clear to auscultation bilaterally   HEART: S1/S2 present. RRR.   ABD: Soft, non-tender, non-distended. Bowel sounds present  EXT: NC/NC/NE/2+PP/ORDOÑEZ  NEURO: Does not follow commands

## 2018-02-15 LAB
CULTURE RESULTS: SIGNIFICANT CHANGE UP
SPECIMEN SOURCE: SIGNIFICANT CHANGE UP

## 2018-02-15 RX ADMIN — HEPARIN SODIUM 5000 UNIT(S): 5000 INJECTION INTRAVENOUS; SUBCUTANEOUS at 13:51

## 2018-02-15 RX ADMIN — SENNA PLUS 2 TABLET(S): 8.6 TABLET ORAL at 21:26

## 2018-02-15 RX ADMIN — CHLORHEXIDINE GLUCONATE 15 MILLILITER(S): 213 SOLUTION TOPICAL at 17:38

## 2018-02-15 RX ADMIN — HEPARIN SODIUM 5000 UNIT(S): 5000 INJECTION INTRAVENOUS; SUBCUTANEOUS at 05:37

## 2018-02-15 RX ADMIN — PANTOPRAZOLE SODIUM 40 MILLIGRAM(S): 20 TABLET, DELAYED RELEASE ORAL at 06:06

## 2018-02-15 RX ADMIN — HEPARIN SODIUM 5000 UNIT(S): 5000 INJECTION INTRAVENOUS; SUBCUTANEOUS at 21:26

## 2018-02-15 RX ADMIN — CHLORHEXIDINE GLUCONATE 15 MILLILITER(S): 213 SOLUTION TOPICAL at 05:37

## 2018-02-15 RX ADMIN — Medication 100 MILLIGRAM(S): at 11:35

## 2018-02-15 NOTE — PROGRESS NOTE ADULT - SUBJECTIVE AND OBJECTIVE BOX
infectious diseases progress note:  MICHELLE DRISCOLL is a 27yFemale patient    OVERDOSE    Acute cystitis without hematuria  Encephalopathy  Drug overdose, undetermined intent, initial encounter  Acute respiratory failure, unspecified whether with hypoxia or hypercapnia      ROS:  CONSTITUTIONAL:  Negative fever or chills, feels well, good appetite  EYES:  Negative  blurry vision or double vision  CARDIOVASCULAR:  Negative for chest pain or palpitations  RESPIRATORY:  Negative for cough, wheezing, or SOB   GASTROINTESTINAL:  Negative for nausea, vomiting, diarrhea, constipation, or abdominal pain  GENITOURINARY:  Negative frequency, urgency or dysuria  NEUROLOGIC:  No headache, confusion, dizziness, lightheadedness    Allergies    No Known Allergies    Intolerances        ANTIBIOTICS/RELEVANT:  antimicrobials    immunologic:    OTHER:  acetaminophen    Suspension 650 milliGRAM(s) Oral every 4 hours PRN  chlorhexidine 0.12% Liquid 15 milliLiter(s) Swish and Spit two times a day  docusate sodium 100 milliGRAM(s) Oral daily  heparin  Injectable 5000 Unit(s) SubCutaneous every 8 hours  pantoprazole   Suspension 40 milliGRAM(s) Oral before breakfast  senna 2 Tablet(s) Oral at bedtime      Objective:  T(F): 99.6 (02-15-18 @ 07:10), Max: 101 (02-14-18 @ 15:00)  HR: 106 (02-15-18 @ 07:10) (101 - 109)  BP: 124/80 (02-15-18 @ 07:10) (120/69 - 136/83)  RR: 20 (02-15-18 @ 07:10) (17 - 20)  SpO2: 99% (02-15-18 @ 00:45) (99% - 99%)    PHYSICAL EXAM  Constitutional:Well-developed, well nourished  Eyes:SHELLIE, EOMI  Ear/Nose/Throat: no oral lesion, no sinus tenderness on percussion	  Neck:no JVD, no lymphadenopathy, supple  Respiratory: CTA dinesh  Cardiovascular: S1S2 RRR, no murmurs  Gastrointestinal:soft, (+) BS, no HSM  Extremities:no e/e/c                    Culture - Urine (collected 10 Feb 2018 10:58)  Source: .Urine Clean Catch (Midstream)  Final Report (12 Feb 2018 16:54):    >100,000 CFU/ml Escherichia coli  Organism: Escherichia coli (12 Feb 2018 16:54)  Organism: Escherichia coli (12 Feb 2018 16:54)      -  Amikacin: S <=8      -  Ampicillin: R >16      -  Ampicillin/Sulbactam: R 16/8      -  Aztreonam: R >16      -  Cefazolin: R >16      -  Cefepime: R >16      -  Cefoxitin: I 16      -  Ceftazidime: R >16      -  Ceftriaxone: R >32      -  Ciprofloxacin: R >2      -  Ertapenem: S <=0.5      -  Gentamicin: R >8      -  Imipenem: S <=1      -  Levofloxacin: R >4      -  Meropenem: S <=1      -  Nitrofurantoin: S <=32      -  Piperacillin/Tazobactam: R <=8      -  Tobramycin: I 8      -  Trimethoprim/Sulfamethoxazole: R >2/38      Method Type: SHARAN    Culture - Stool (collected 10 Feb 2018 09:57)  Source: .Stool Feces  Preliminary Report (14 Feb 2018 17:46):    No enteric pathogens to date: Final culture pending

## 2018-02-15 NOTE — PROGRESS NOTE ADULT - SUBJECTIVE AND OBJECTIVE BOX
CHIEF COMPLAINT:   Patient is a 27y old  Female who presents with a chief complaint of mental status change. She is been treated for encephalopathy likely secondary to Substance  abuse and overdose.   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       HISTORY OF PRESENTING ILLNESS:   Patient is a 27y old  Female who presents with a chief complaint of mental status change. She is been treated for encephalopathy likely secondary to Substance  abuse and overdose.   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     Patient is S/P extubation and currently status trqach and status peg. having persistent fever and treated for UTI also.     VITALS:   Vital Signs Last 24 Hrs  T(C): 37.6 (15 Feb 2018 07:10), Max: 38.3 (14 Feb 2018 15:00)  T(F): 99.6 (15 Feb 2018 07:10), Max: 101 (14 Feb 2018 15:00)  HR: 102 (15 Feb 2018 08:45) (101 - 109)  BP: 124/80 (15 Feb 2018 07:10) (120/69 - 136/83)  BP(mean): 98 (15 Feb 2018 07:10) (98 - 100)  RR: 20 (15 Feb 2018 07:10) (17 - 20)  SpO2: 98% (15 Feb 2018 08:45) (98% - 99%)    LABS:                      Basic Metabolic Panel in AM (02.13.18 @ 08:09)    Sodium, Serum: 136 mmol/L    Potassium, Serum: 3.6 mmol/L    Chloride, Serum: 100 mmol/L    Carbon Dioxide, Serum: 26 mmol/L    Anion Gap, Serum: 10 mmol/L    Blood Urea Nitrogen, Serum: 21 mg/dL    Creatinine, Serum: 0.4 mg/dL    Glucose, Serum: 156 mg/dL    Calcium, Total Serum: 9.7 mg/dL    RADIOLOGY: Chest xray - Retro cardiac opacity noted     PHYSICAL EXAM:  GEN: No acute distress  HEENT:  STATUS TRACH   LUNGS: Clear to auscultation bilaterally   HEART: S1/S2 present. RRR.   ABD: Soft, non-tender, non-distended. Bowel sounds present  EXT: No edema and no skin break noted.   NEURO: AAOX no orientation. sedated mostly     ASSESSMENT & PLAN     1. Overdose -  s/p trach and peg.     2. Acute respiratory Failure  - S/P trach now.     3. Fever - UTI - ID appreciated no antibiotic needed at this time . c/w following cultures.     Tylenol rectally round the clock now.     D/W ID AND SINCE PATIENT HAS POSITIVE URINE CULTURE WILL TREAT WITH MEROPENEM NOW. START ON MEROPENEM. SENSITIVE TO ORGANISM    D/W Mother at the bed side and also the house staff.   Trach and peg care.

## 2018-02-15 NOTE — PROGRESS NOTE ADULT - ASSESSMENT
Patient with overdose, left basilar atelectasis and ESBL E coli bactiuria.    Patient remains afebrile.  Continue to observe off antibioitcs.

## 2018-02-16 LAB
ANION GAP SERPL CALC-SCNC: 10 MMOL/L — SIGNIFICANT CHANGE UP (ref 7–14)
BASOPHILS # BLD AUTO: 0.03 K/UL — SIGNIFICANT CHANGE UP (ref 0–0.2)
BASOPHILS NFR BLD AUTO: 0.4 % — SIGNIFICANT CHANGE UP (ref 0–1)
BUN SERPL-MCNC: 20 MG/DL — SIGNIFICANT CHANGE UP (ref 10–20)
CALCIUM SERPL-MCNC: 9.6 MG/DL — SIGNIFICANT CHANGE UP (ref 8.5–10.1)
CHLORIDE SERPL-SCNC: 102 MMOL/L — SIGNIFICANT CHANGE UP (ref 98–110)
CO2 SERPL-SCNC: 26 MMOL/L — SIGNIFICANT CHANGE UP (ref 17–32)
CREAT SERPL-MCNC: 0.4 MG/DL — LOW (ref 0.7–1.5)
EOSINOPHIL # BLD AUTO: 0.19 K/UL — SIGNIFICANT CHANGE UP (ref 0–0.7)
EOSINOPHIL NFR BLD AUTO: 2.8 % — SIGNIFICANT CHANGE UP (ref 0–8)
GLUCOSE SERPL-MCNC: 98 MG/DL — SIGNIFICANT CHANGE UP (ref 70–110)
HCT VFR BLD CALC: 34.6 % — LOW (ref 37–47)
HGB BLD-MCNC: 11.7 G/DL — LOW (ref 14–18)
IMM GRANULOCYTES NFR BLD AUTO: 0.6 % — HIGH (ref 0.1–0.3)
LYMPHOCYTES # BLD AUTO: 1.24 K/UL — SIGNIFICANT CHANGE UP (ref 1.2–3.4)
LYMPHOCYTES # BLD AUTO: 18.4 % — LOW (ref 20.5–51.1)
MCHC RBC-ENTMCNC: 31 PG — SIGNIFICANT CHANGE UP (ref 27–31)
MCHC RBC-ENTMCNC: 33.8 G/DL — SIGNIFICANT CHANGE UP (ref 32–37)
MCV RBC AUTO: 91.5 FL — HIGH (ref 81–91)
MONOCYTES # BLD AUTO: 0.67 K/UL — HIGH (ref 0.1–0.6)
MONOCYTES NFR BLD AUTO: 9.9 % — HIGH (ref 1.7–9.3)
NEUTROPHILS # BLD AUTO: 4.57 K/UL — SIGNIFICANT CHANGE UP (ref 1.4–6.5)
NEUTROPHILS NFR BLD AUTO: 67.9 % — SIGNIFICANT CHANGE UP (ref 42.2–75.2)
NRBC # BLD: 0 /100 WBCS — SIGNIFICANT CHANGE UP (ref 0–0)
PLATELET # BLD AUTO: 300 K/UL — SIGNIFICANT CHANGE UP (ref 130–400)
POTASSIUM SERPL-MCNC: 4.2 MMOL/L — SIGNIFICANT CHANGE UP (ref 3.5–5)
POTASSIUM SERPL-SCNC: 4.2 MMOL/L — SIGNIFICANT CHANGE UP (ref 3.5–5)
RBC # BLD: 3.78 M/UL — LOW (ref 4.2–5.4)
RBC # FLD: 12.9 % — SIGNIFICANT CHANGE UP (ref 11.5–14.5)
SODIUM SERPL-SCNC: 138 MMOL/L — SIGNIFICANT CHANGE UP (ref 135–146)
WBC # BLD: 6.74 K/UL — SIGNIFICANT CHANGE UP (ref 4.8–10.8)
WBC # FLD AUTO: 6.74 K/UL — SIGNIFICANT CHANGE UP (ref 4.8–10.8)

## 2018-02-16 RX ORDER — MEROPENEM 1 G/30ML
500 INJECTION INTRAVENOUS EVERY 8 HOURS
Qty: 0 | Refills: 0 | Status: DISCONTINUED | OUTPATIENT
Start: 2018-02-16 | End: 2018-02-23

## 2018-02-16 RX ORDER — MEROPENEM 1 G/30ML
500 INJECTION INTRAVENOUS ONCE
Qty: 0 | Refills: 0 | Status: COMPLETED | OUTPATIENT
Start: 2018-02-16 | End: 2018-02-16

## 2018-02-16 RX ORDER — MEROPENEM 1 G/30ML
INJECTION INTRAVENOUS
Qty: 0 | Refills: 0 | Status: DISCONTINUED | OUTPATIENT
Start: 2018-02-16 | End: 2018-02-23

## 2018-02-16 RX ORDER — ACETAMINOPHEN 500 MG
650 TABLET ORAL EVERY 6 HOURS
Qty: 0 | Refills: 0 | Status: DISCONTINUED | OUTPATIENT
Start: 2018-02-16 | End: 2018-02-17

## 2018-02-16 RX ADMIN — MEROPENEM 100 MILLIGRAM(S): 1 INJECTION INTRAVENOUS at 16:07

## 2018-02-16 RX ADMIN — MEROPENEM 100 MILLIGRAM(S): 1 INJECTION INTRAVENOUS at 21:24

## 2018-02-16 RX ADMIN — HEPARIN SODIUM 5000 UNIT(S): 5000 INJECTION INTRAVENOUS; SUBCUTANEOUS at 06:30

## 2018-02-16 RX ADMIN — HEPARIN SODIUM 5000 UNIT(S): 5000 INJECTION INTRAVENOUS; SUBCUTANEOUS at 21:27

## 2018-02-16 RX ADMIN — Medication 650 MILLIGRAM(S): at 18:52

## 2018-02-16 RX ADMIN — PANTOPRAZOLE SODIUM 40 MILLIGRAM(S): 20 TABLET, DELAYED RELEASE ORAL at 06:30

## 2018-02-16 RX ADMIN — Medication 100 MILLIGRAM(S): at 11:57

## 2018-02-16 RX ADMIN — CHLORHEXIDINE GLUCONATE 15 MILLILITER(S): 213 SOLUTION TOPICAL at 18:52

## 2018-02-16 RX ADMIN — CHLORHEXIDINE GLUCONATE 15 MILLILITER(S): 213 SOLUTION TOPICAL at 06:30

## 2018-02-16 RX ADMIN — HEPARIN SODIUM 5000 UNIT(S): 5000 INJECTION INTRAVENOUS; SUBCUTANEOUS at 16:08

## 2018-02-16 RX ADMIN — MEROPENEM 100 MILLIGRAM(S): 1 INJECTION INTRAVENOUS at 02:07

## 2018-02-16 RX ADMIN — MEROPENEM 100 MILLIGRAM(S): 1 INJECTION INTRAVENOUS at 06:31

## 2018-02-16 NOTE — PROGRESS NOTE ADULT - ASSESSMENT
Patient remains afebrile.  Continue to observe off antibiotics.    Recent overdose with left basilar atelectasis and ESBL E coli bactiuria. T101 last night.  Continue meropenem.  \  Recent overdose with left basilar atelectasis and ESBL E coli UTI.

## 2018-02-16 NOTE — PROGRESS NOTE ADULT - SUBJECTIVE AND OBJECTIVE BOX
Patient is a 27y old  Female who presents with a chief complaint of mental status change. She is been treated for encephalopathy likely secondary to Substance  abuse and overdose. Patient is S/P extubation and currently status trqach and status peg. having persistent fever and treated for UTI also.       PAST MEDICAL & SURGICAL HISTORY:      MEDICATIONS  (STANDING):  acetaminophen  Suppository 650 milliGRAM(s) Rectal every 6 hours  chlorhexidine 0.12% Liquid 15 milliLiter(s) Swish and Spit two times a day  docusate sodium 100 milliGRAM(s) Oral daily  heparin  Injectable 5000 Unit(s) SubCutaneous every 8 hours  meropenem  IVPB      meropenem  IVPB 500 milliGRAM(s) IV Intermittent every 8 hours  pantoprazole   Suspension 40 milliGRAM(s) Oral before breakfast  senna 2 Tablet(s) Oral at bedtime    MEDICATIONS  (PRN):  acetaminophen    Suspension 650 milliGRAM(s) Oral every 4 hours PRN For Temp greater than 38 C (100.4 F)      Overnight events:    Vital Signs Last 24 Hrs  T(C): 37.8 (16 Feb 2018 15:00), Max: 38.3 (16 Feb 2018 01:10)  T(F): 100 (16 Feb 2018 15:00), Max: 101 (16 Feb 2018 01:10)  HR: 99 (16 Feb 2018 15:00) (92 - 117)  BP: 121/77 (16 Feb 2018 15:00) (120/76 - 136/76)  BP(mean): 95 (16 Feb 2018 07:10) (93 - 95)  RR: 18 (16 Feb 2018 15:00) (18 - 21)  SpO2: 96% (16 Feb 2018 08:25) (93% - 99%)  CAPILLARY BLOOD GLUCOSE        I&O's Summary    15 Feb 2018 07:01  -  16 Feb 2018 07:00  --------------------------------------------------------  IN: 1450 mL / OUT: 0 mL / NET: 1450 mL        Physical Exam:    -     General : patient does not apear to be in any acute distress at this time.     -      HEENT: neck soft no masses Trachea in place.     -      Cardiac: s1s2 nu murmur.     -      Pulm: good air entry     -      GI: abd soft non tender. PEG tube in place.     -      Musculoskeletal: nothing noted.     -      Neuro: patient is awake however does not follow commands there is no response to any stimuli. she does not follow with her eyes either. there is no strength in hand  bilaterally. when told to  hand patient does not follow.         Labs:                  follow with labs this evening.                         Imaging:    ECG:    T(C): 37.8 (02-16-18 @ 15:00), Max: 38.3 (02-16-18 @ 01:10)  HR: 99 (02-16-18 @ 15:00) (92 - 117)  BP: 121/77 (02-16-18 @ 15:00) (120/76 - 136/76)  RR: 18 (02-16-18 @ 15:00) (18 - 21)  SpO2: 96% (02-16-18 @ 08:25) (93% - 99%)

## 2018-02-16 NOTE — PROGRESS NOTE ADULT - SUBJECTIVE AND OBJECTIVE BOX
infectious diseases progress note:  MICHELLE DRISCOLL is a 27yFemale patient    OVERDOSE    Acute cystitis without hematuria  Encephalopathy  Drug overdose, undetermined intent, initial encounter  Acute respiratory failure, unspecified whether with hypoxia or hypercapnia      ROS:  CONSTITUTIONAL:  Negative fever or chills, feels well, good appetite  EYES:  Negative  blurry vision or double vision  CARDIOVASCULAR:  Negative for chest pain or palpitations  RESPIRATORY:  Negative for cough, wheezing, or SOB   GASTROINTESTINAL:  Negative for nausea, vomiting, diarrhea, constipation, or abdominal pain  GENITOURINARY:  Negative frequency, urgency or dysuria  NEUROLOGIC:  No headache, confusion, dizziness, lightheadedness    Allergies    No Known Allergies    Intolerances        ANTIBIOTICS/RELEVANT:  antimicrobials  meropenem  IVPB      meropenem  IVPB 500 milliGRAM(s) IV Intermittent every 8 hours    immunologic:    OTHER:  acetaminophen    Suspension 650 milliGRAM(s) Oral every 4 hours PRN  chlorhexidine 0.12% Liquid 15 milliLiter(s) Swish and Spit two times a day  docusate sodium 100 milliGRAM(s) Oral daily  heparin  Injectable 5000 Unit(s) SubCutaneous every 8 hours  pantoprazole   Suspension 40 milliGRAM(s) Oral before breakfast  senna 2 Tablet(s) Oral at bedtime      Objective:  T(F): 99.1 (02-16-18 @ 07:10), Max: 101 (02-16-18 @ 01:10)  HR: 106 (02-16-18 @ 07:10) (92 - 107)  BP: 125/77 (02-16-18 @ 07:10) (120/76 - 136/76)  RR: 20 (02-16-18 @ 07:10) (18 - 21)  SpO2: 93% (02-16-18 @ 07:10) (93% - 99%)    PHYSICAL EXAM  Constitutional:Well-developed, well nourished  Eyes:SHELLIE, EOMI  Ear/Nose/Throat: no oral lesion, no sinus tenderness on percussion	  Neck:no JVD, no lymphadenopathy, supple  Respiratory: CTA dinesh  Cardiovascular: S1S2 RRR, no murmurs  Gastrointestinal:soft, (+) BS, no HSM  Extremities:no e/e/c        Culture - Urine (collected 10 Feb 2018 10:58)  Source: .Urine Clean Catch (Midstream)  Final Report (12 Feb 2018 16:54):    >100,000 CFU/ml Escherichia coli  Organism: Escherichia coli (12 Feb 2018 16:54)  Organism: Escherichia coli (12 Feb 2018 16:54)      -  Amikacin: S <=8      -  Ampicillin: R >16      -  Ampicillin/Sulbactam: R 16/8      -  Aztreonam: R >16      -  Cefazolin: R >16      -  Cefepime: R >16      -  Cefoxitin: I 16      -  Ceftazidime: R >16      -  Ceftriaxone: R >32      -  Ciprofloxacin: R >2      -  Ertapenem: S <=0.5      -  Gentamicin: R >8      -  Imipenem: S <=1      -  Levofloxacin: R >4      -  Meropenem: S <=1      -  Nitrofurantoin: S <=32      -  Piperacillin/Tazobactam: R <=8      -  Tobramycin: I 8      -  Trimethoprim/Sulfamethoxazole: R >2/38      Method Type: SHARAN    Culture - Stool (collected 10 Feb 2018 09:57)  Source: .Stool Feces  Final Report (15 Feb 2018 17:23):    No enteric pathogens isolated.    (Stool culture examined for Salmonella,    Shigella, Campylobacter, Aeromonas, Plesiomonas,    Vibrio, E.coli O157 and Yersinia)

## 2018-02-16 NOTE — PROGRESS NOTE ADULT - NEUROLOGICAL COMMENTS
patient is awake however does not follow commands there is no response to any stimuli. she does not follow with her eyes either. there is no strength in hand  bilaterally. when told to  hand patient does not follow

## 2018-02-16 NOTE — PROGRESS NOTE ADULT - ASSESSMENT
Patient is a 27y old  Female who presents with a chief complaint of mental status change. She is been treated for encephalopathy likely secondary to Substance  abuse and overdose. Patient is S/P extubation and currently status trqach and status peg. having persistent fever and treated for UTI also.

## 2018-02-17 LAB
ANION GAP SERPL CALC-SCNC: 9 MMOL/L — SIGNIFICANT CHANGE UP (ref 7–14)
BASOPHILS # BLD AUTO: 0.03 K/UL — SIGNIFICANT CHANGE UP (ref 0–0.2)
BASOPHILS NFR BLD AUTO: 0.5 % — SIGNIFICANT CHANGE UP (ref 0–1)
BUN SERPL-MCNC: 18 MG/DL — SIGNIFICANT CHANGE UP (ref 10–20)
CALCIUM SERPL-MCNC: 10 MG/DL — SIGNIFICANT CHANGE UP (ref 8.5–10.1)
CHLORIDE SERPL-SCNC: 102 MMOL/L — SIGNIFICANT CHANGE UP (ref 98–110)
CO2 SERPL-SCNC: 28 MMOL/L — SIGNIFICANT CHANGE UP (ref 17–32)
CREAT SERPL-MCNC: 0.5 MG/DL — LOW (ref 0.7–1.5)
EOSINOPHIL # BLD AUTO: 0.15 K/UL — SIGNIFICANT CHANGE UP (ref 0–0.7)
EOSINOPHIL NFR BLD AUTO: 2.6 % — SIGNIFICANT CHANGE UP (ref 0–8)
GLUCOSE SERPL-MCNC: 125 MG/DL — HIGH (ref 70–110)
HCT VFR BLD CALC: 35.1 % — LOW (ref 37–47)
HGB BLD-MCNC: 12.1 G/DL — LOW (ref 14–18)
IMM GRANULOCYTES NFR BLD AUTO: 0.7 % — HIGH (ref 0.1–0.3)
LYMPHOCYTES # BLD AUTO: 1.17 K/UL — LOW (ref 1.2–3.4)
LYMPHOCYTES # BLD AUTO: 20.3 % — LOW (ref 20.5–51.1)
MCHC RBC-ENTMCNC: 31.6 PG — HIGH (ref 27–31)
MCHC RBC-ENTMCNC: 34.5 G/DL — SIGNIFICANT CHANGE UP (ref 32–37)
MCV RBC AUTO: 91.6 FL — HIGH (ref 81–91)
MONOCYTES # BLD AUTO: 0.54 K/UL — SIGNIFICANT CHANGE UP (ref 0.1–0.6)
MONOCYTES NFR BLD AUTO: 9.4 % — HIGH (ref 1.7–9.3)
NEUTROPHILS # BLD AUTO: 3.84 K/UL — SIGNIFICANT CHANGE UP (ref 1.4–6.5)
NEUTROPHILS NFR BLD AUTO: 66.5 % — SIGNIFICANT CHANGE UP (ref 42.2–75.2)
NRBC # BLD: 0 /100 WBCS — SIGNIFICANT CHANGE UP (ref 0–0)
PLATELET # BLD AUTO: 300 K/UL — SIGNIFICANT CHANGE UP (ref 130–400)
POTASSIUM SERPL-MCNC: 4.8 MMOL/L — SIGNIFICANT CHANGE UP (ref 3.5–5)
POTASSIUM SERPL-SCNC: 4.8 MMOL/L — SIGNIFICANT CHANGE UP (ref 3.5–5)
RBC # BLD: 3.83 M/UL — LOW (ref 4.2–5.4)
RBC # FLD: 12.8 % — SIGNIFICANT CHANGE UP (ref 11.5–14.5)
SODIUM SERPL-SCNC: 139 MMOL/L — SIGNIFICANT CHANGE UP (ref 135–146)
WBC # BLD: 5.77 K/UL — SIGNIFICANT CHANGE UP (ref 4.8–10.8)
WBC # FLD AUTO: 5.77 K/UL — SIGNIFICANT CHANGE UP (ref 4.8–10.8)

## 2018-02-17 RX ORDER — DOCUSATE SODIUM 100 MG
100 CAPSULE ORAL
Qty: 0 | Refills: 0 | Status: DISCONTINUED | OUTPATIENT
Start: 2018-02-17 | End: 2018-02-17

## 2018-02-17 RX ORDER — LACTULOSE 10 G/15ML
15 SOLUTION ORAL
Qty: 0 | Refills: 0 | Status: DISCONTINUED | OUTPATIENT
Start: 2018-02-17 | End: 2018-03-08

## 2018-02-17 RX ADMIN — CHLORHEXIDINE GLUCONATE 15 MILLILITER(S): 213 SOLUTION TOPICAL at 05:39

## 2018-02-17 RX ADMIN — HEPARIN SODIUM 5000 UNIT(S): 5000 INJECTION INTRAVENOUS; SUBCUTANEOUS at 21:15

## 2018-02-17 RX ADMIN — MEROPENEM 100 MILLIGRAM(S): 1 INJECTION INTRAVENOUS at 05:37

## 2018-02-17 RX ADMIN — MEROPENEM 100 MILLIGRAM(S): 1 INJECTION INTRAVENOUS at 21:15

## 2018-02-17 RX ADMIN — Medication 650 MILLIGRAM(S): at 00:05

## 2018-02-17 RX ADMIN — Medication 650 MILLIGRAM(S): at 05:37

## 2018-02-17 RX ADMIN — SENNA PLUS 2 TABLET(S): 8.6 TABLET ORAL at 21:15

## 2018-02-17 RX ADMIN — CHLORHEXIDINE GLUCONATE 15 MILLILITER(S): 213 SOLUTION TOPICAL at 18:02

## 2018-02-17 RX ADMIN — MEROPENEM 100 MILLIGRAM(S): 1 INJECTION INTRAVENOUS at 14:45

## 2018-02-17 RX ADMIN — PANTOPRAZOLE SODIUM 40 MILLIGRAM(S): 20 TABLET, DELAYED RELEASE ORAL at 07:35

## 2018-02-17 RX ADMIN — HEPARIN SODIUM 5000 UNIT(S): 5000 INJECTION INTRAVENOUS; SUBCUTANEOUS at 14:45

## 2018-02-17 RX ADMIN — HEPARIN SODIUM 5000 UNIT(S): 5000 INJECTION INTRAVENOUS; SUBCUTANEOUS at 05:38

## 2018-02-17 NOTE — PROGRESS NOTE ADULT - PROBLEM SELECTOR PROBLEM 3
Encephalopathy
Acute cystitis without hematuria
Acute cystitis without hematuria

## 2018-02-17 NOTE — PROGRESS NOTE ADULT - PROBLEM SELECTOR PLAN 3
f/u with neurology
f/u with neurology    for fevers, ID tamie
f/u with neurology    s/p ID eval send w/u and no ABX as per ID
f/u with neurology
f/u with neurology    for fevers, ID tamie
f/u with neurology    for fevers, ID tamie
f/u with neurology    s/p ID eval send w/u and no ABX as per ID
f/u with neurology    s/p ID eval send w/u and no ABX as per ID
Patient now on Meropenam   E.coli in urine >100k
Patient now on Meropenam   E.coli in urine >100k

## 2018-02-17 NOTE — PROGRESS NOTE ADULT - SUBJECTIVE AND OBJECTIVE BOX
MICHELLE DRISCOLL  27y, Female      OVERNIGHT EVENTS:    no fevers, nonresponsive, no diarrhea, no cough    VITALS:  T(F): 98.5, Max: 100 (02-16-18 @ 15:00)  HR: 112  BP: 132/77  RR: 20Vital Signs Last 24 Hrs  T(C): 36.9 (17 Feb 2018 07:29), Max: 37.8 (16 Feb 2018 15:00)  T(F): 98.5 (17 Feb 2018 07:29), Max: 100 (16 Feb 2018 15:00)  HR: 112 (17 Feb 2018 09:27) (92 - 112)  BP: 132/77 (17 Feb 2018 07:29) (121/77 - 132/77)  BP(mean): 98 (17 Feb 2018 07:29) (98 - 98)  RR: 20 (17 Feb 2018 07:29) (18 - 20)  SpO2: 98% (17 Feb 2018 09:27) (94% - 100%)    TESTS & MEASUREMENTS:                        12.1   5.77  )-----------( 300      ( 17 Feb 2018 08:04 )             35.1     02-17    139  |  102  |  18  ----------------------------<  125<H>  4.8   |  28  |  0.5<L>    Ca    10.0      17 Feb 2018 08:04          Culture - Urine (collected 02-10-18 @ 10:58)  Source: .Urine Clean Catch (Midstream)  Final Report (02-12-18 @ 16:54):    >100,000 CFU/ml Escherichia coli  Organism: Escherichia coli (02-12-18 @ 16:54)  Organism: Escherichia coli (02-12-18 @ 16:54)      -  Amikacin: S <=8      -  Ampicillin: R >16      -  Ampicillin/Sulbactam: R 16/8      -  Aztreonam: R >16      -  Cefazolin: R >16      -  Cefepime: R >16      -  Cefoxitin: I 16      -  Ceftazidime: R >16      -  Ceftriaxone: R >32      -  Ciprofloxacin: R >2      -  Ertapenem: S <=0.5      -  Gentamicin: R >8      -  Imipenem: S <=1      -  Levofloxacin: R >4      -  Meropenem: S <=1      -  Nitrofurantoin: S <=32      -  Piperacillin/Tazobactam: R <=8      -  Tobramycin: I 8      -  Trimethoprim/Sulfamethoxazole: R >2/38      Method Type: SHARAN            RADIOLOGY & ADDITIONAL TESTS:    ANTIBIOTICS:  meropenem  IVPB      meropenem  IVPB 500 milliGRAM(s) IV Intermittent every 8 hours

## 2018-02-17 NOTE — PROGRESS NOTE ADULT - SUBJECTIVE AND OBJECTIVE BOX
Patient is a 27y old  Female who presents with a chief complaint of mental status change. She is been treated for encephalopathy likely secondary to Substance  abuse and overdose. Patient is S/P extubation and currently status trqach and status peg. having persistent fever and treated for UTI also.       PAST MEDICAL & SURGICAL HISTORY:    MEDICATIONS  (STANDING):  acetaminophen  Suppository 650 milliGRAM(s) Rectal every 6 hours  chlorhexidine 0.12% Liquid 15 milliLiter(s) Swish and Spit two times a day  docusate sodium 100 milliGRAM(s) Oral daily  heparin  Injectable 5000 Unit(s) SubCutaneous every 8 hours  meropenem  IVPB      meropenem  IVPB 500 milliGRAM(s) IV Intermittent every 8 hours  pantoprazole   Suspension 40 milliGRAM(s) Oral before breakfast  senna 2 Tablet(s) Oral at bedtime    MEDICATIONS  (PRN):  acetaminophen    Suspension 650 milliGRAM(s) Oral every 4 hours PRN For Temp greater than 38 C (100.4 F)        Overnight events:    Vital Signs Last 24 Hrs  T(C): 36.9 (17 Feb 2018 07:29), Max: 37.8 (16 Feb 2018 15:00)  T(F): 98.5 (17 Feb 2018 07:29), Max: 100 (16 Feb 2018 15:00)  HR: 104 (17 Feb 2018 07:29) (92 - 104)  BP: 132/77 (17 Feb 2018 07:29) (121/77 - 132/77)  BP(mean): 98 (17 Feb 2018 07:29) (98 - 98)  RR: 20 (17 Feb 2018 07:29) (18 - 20)  SpO2: 94% (16 Feb 2018 23:52) (94% - 100%)        I&O's Summary    15 Feb 2018 07:01  -  16 Feb 2018 07:00  --------------------------------------------------------  IN: 1450 mL / OUT: 0 mL / NET: 1450 mL        Physical Exam:    -     General : patient does not apear to be in any acute distress at this time.     -      HEENT: neck soft no masses Trachea in place.     -      Cardiac: s1s2 nu murmur.     -      Pulm: good air entry     -      GI: abd soft non tender. PEG tube in place.     -      Musculoskeletal: nothing noted.     -      Neuro: patient is awake however does not follow commands there is no response to any stimuli. she does not follow with her eyes either. there is no strength in hand  bilaterally. when told to  hand patient does not follow.         Labs:                                      12.1   5.77  )-----------( 300      ( 17 Feb 2018 08:04 )             35.1     02-16    138  |  102  |  20  ----------------------------<  98  4.2   |  26  |  0.4<L>    Ca    9.6      16 Feb 2018 21:08                  Imaging:    ECG:    T(C): 37.8 (02-16-18 @ 15:00), Max: 38.3 (02-16-18 @ 01:10)  HR: 99 (02-16-18 @ 15:00) (92 - 117)  BP: 121/77 (02-16-18 @ 15:00) (120/76 - 136/76)  RR: 18 (02-16-18 @ 15:00) (18 - 21)  SpO2: 96% (02-16-18 @ 08:25) (93% - 99%)

## 2018-02-17 NOTE — PROGRESS NOTE ADULT - CARDIOVASCULAR
Regular rate & rhythm, normal S1, S2; no murmurs, gallops or rubs; no S3, S4
Regular rate & rhythm, normal S1, S2; no murmurs, gallops or rubs; no S3, S4
negative
negative
detailed exam

## 2018-02-17 NOTE — PROGRESS NOTE ADULT - ASSESSMENT
fevers  in all probability central and not infectious. Nevertherless being covered for a possible pyelonephritis with meropenem but still spiking

## 2018-02-17 NOTE — PROGRESS NOTE ADULT - PROBLEM SELECTOR PLAN 1
weaning trial when more awake going for trach today
s/p trach
weaning trial when more awake
weaning trial when more awake
Patient trached at this point.  continue to monitor for responsiveness  Intent of overdose not determined at this point.
Patient trached at this point.  continue to monitor for responsiveness  Intent of overdose not determined at this point.

## 2018-02-17 NOTE — PROGRESS NOTE ADULT - GASTROINTESTINAL
Soft, non-tender, no hepatosplenomegaly, normal bowel sounds
Soft, non-tender, no hepatosplenomegaly, normal bowel sounds
negative
negative

## 2018-02-17 NOTE — PROGRESS NOTE ADULT - ASSESSMENT
1. Acute respiratory failure: due to drugs overdose, improved s/p extubation and Trach placement.   2. encephalopathy. due to drugs overdose.   stable.     3. Fever: despite antibiotics.   ID continued with antibiotics, thinks it is central fever rather than infectious.

## 2018-02-17 NOTE — PROGRESS NOTE ADULT - PROBLEM SELECTOR PLAN 2
f/u neurology and toxicology
f/u with neurology
f/u with neurology
f/u neurology and toxicology
f/u neurology and toxicology
f/u with neurology
Trached   Continue Trachea care as per unit routine   Follow with Pulmonary
Trached   Continue Trachea care as per unit routine   Follow with Pulmonary

## 2018-02-17 NOTE — PROGRESS NOTE ADULT - PROBLEM SELECTOR PROBLEM 1
Acute respiratory failure, unspecified whether with hypoxia or hypercapnia
Drug overdose, undetermined intent, initial encounter
Drug overdose, undetermined intent, initial encounter

## 2018-02-17 NOTE — PROGRESS NOTE ADULT - PROBLEM SELECTOR PROBLEM 2
Drug overdose, undetermined intent, initial encounter
Acute respiratory failure, unspecified whether with hypoxia or hypercapnia
Acute respiratory failure, unspecified whether with hypoxia or hypercapnia

## 2018-02-17 NOTE — PROGRESS NOTE ADULT - SUBJECTIVE AND OBJECTIVE BOX
MICHELLE DRISCOLL  27y  Female      Patient is a 27y old  Female who presents with a chief complaint of     INTERVAL HPI/OVERNIGHT EVENTS:  She is still with fever.     Vital Signs Last 24 Hrs  T(C): 38.1 (17 Feb 2018 15:44), Max: 38.1 (17 Feb 2018 15:44)  T(F): 100.6 (17 Feb 2018 15:44), Max: 100.6 (17 Feb 2018 15:44)  HR: 99 (17 Feb 2018 15:44) (94 - 112)  BP: 121/76 (17 Feb 2018 15:44) (121/76 - 132/77)  BP(mean): 93 (17 Feb 2018 15:44) (93 - 98)  RR: 20 (17 Feb 2018 15:44) (18 - 20)  SpO2: 98% (17 Feb 2018 09:27) (94% - 98%)      02-16-18 @ 07:01  -  02-17-18 @ 07:00  --------------------------------------------------------  IN: 1510 mL / OUT: 0 mL / NET: 1510 mL            Consultant(s) Notes Reviewed:  [x ] YES  [ ] NO          MEDICATIONS  (STANDING):  chlorhexidine 0.12% Liquid 15 milliLiter(s) Swish and Spit two times a day  heparin  Injectable 5000 Unit(s) SubCutaneous every 8 hours  meropenem  IVPB      meropenem  IVPB 500 milliGRAM(s) IV Intermittent every 8 hours  pantoprazole   Suspension 40 milliGRAM(s) Oral before breakfast  senna 2 Tablet(s) Oral at bedtime    MEDICATIONS  (PRN):  acetaminophen    Suspension 650 milliGRAM(s) Oral every 4 hours PRN For Temp greater than 38 C (100.4 F)  lactulose Syrup 15 Gram(s) Oral two times a day PRN constipation      LABS                          12.1   5.77  )-----------( 300      ( 17 Feb 2018 08:04 )             35.1     02-17    139  |  102  |  18  ----------------------------<  125<H>  4.8   |  28  |  0.5<L>    Ca    10.0      17 Feb 2018 08:04            Lactate Trend        CAPILLARY BLOOD GLUCOSE            RADIOLOGY & ADDITIONAL TESTS:    Imaging Personally Reviewed:  [ ] YES  [ ] NO    HEALTH ISSUES - PROBLEM Dx:  Acute cystitis without hematuria: Acute cystitis without hematuria  Encephalopathy: Encephalopathy  Drug overdose, undetermined intent, initial encounter: Drug overdose, undetermined intent, initial encounter  Acute respiratory failure, unspecified whether with hypoxia or hypercapnia: Acute respiratory failure, unspecified whether with hypoxia or hypercapnia

## 2018-02-18 RX ADMIN — HEPARIN SODIUM 5000 UNIT(S): 5000 INJECTION INTRAVENOUS; SUBCUTANEOUS at 05:06

## 2018-02-18 RX ADMIN — MEROPENEM 100 MILLIGRAM(S): 1 INJECTION INTRAVENOUS at 05:07

## 2018-02-18 RX ADMIN — PANTOPRAZOLE SODIUM 40 MILLIGRAM(S): 20 TABLET, DELAYED RELEASE ORAL at 05:08

## 2018-02-18 RX ADMIN — MEROPENEM 100 MILLIGRAM(S): 1 INJECTION INTRAVENOUS at 14:22

## 2018-02-18 RX ADMIN — HEPARIN SODIUM 5000 UNIT(S): 5000 INJECTION INTRAVENOUS; SUBCUTANEOUS at 21:29

## 2018-02-18 RX ADMIN — CHLORHEXIDINE GLUCONATE 15 MILLILITER(S): 213 SOLUTION TOPICAL at 17:46

## 2018-02-18 RX ADMIN — SENNA PLUS 2 TABLET(S): 8.6 TABLET ORAL at 21:30

## 2018-02-18 RX ADMIN — HEPARIN SODIUM 5000 UNIT(S): 5000 INJECTION INTRAVENOUS; SUBCUTANEOUS at 14:22

## 2018-02-18 RX ADMIN — CHLORHEXIDINE GLUCONATE 15 MILLILITER(S): 213 SOLUTION TOPICAL at 05:07

## 2018-02-18 RX ADMIN — MEROPENEM 100 MILLIGRAM(S): 1 INJECTION INTRAVENOUS at 21:29

## 2018-02-18 NOTE — PROGRESS NOTE ADULT - ASSESSMENT
1. Acute respiratory failure: due to drugs overdose, improved s/p extubation and Trach placement.   2. encephalopathy. due to drugs overdose.   stable.     3. Fever: despite antibiotics.   Etiology less likely infectious, possible central.   stool and urine culture are negative.   ID continued with antibiotics, on Meropenem IV for now.     DVT PPX: heparin SC.     Disposition: discharge to SNF possible Monday and when fever improves. 1. Acute respiratory failure: due to drugs overdose, improved s/p extubation and Trach placement.   2. encephalopathy. due to drugs overdose.   stable.     3. Fever: despite antibiotics.   Etiology less likely infectious, possible central.   stool and urine culture are negative.   ID continued with antibiotics, on Meropenem IV for now.     DVT PPX: heparin SC.     Disposition: discharge to SNF possible Monday and when fever improves.     Director of Service-I fully agree with my colleague's assessment/exam after my independent exam/assessment this morning.

## 2018-02-18 NOTE — PROGRESS NOTE ADULT - SUBJECTIVE AND OBJECTIVE BOX
MICHELLE DRISCOLL  27y  Female      Patient is a 27y old  Female who presents with a chief complaint of     INTERVAL HPI/OVERNIGHT EVENTS:  she still with low grade fever.     Vital Signs Last 24 Hrs  T(C): 37.7 (18 Feb 2018 15:24), Max: 38.1 (17 Feb 2018 15:44)  T(F): 99.8 (18 Feb 2018 15:24), Max: 100.6 (17 Feb 2018 15:44)  HR: 111 (18 Feb 2018 15:24) (98 - 122)  BP: 140/84 (18 Feb 2018 15:24) (121/76 - 140/84)  BP(mean): 108 (18 Feb 2018 07:59) (93 - 108)  RR: 16 (18 Feb 2018 15:24) (14 - 22)  SpO2: 99% (18 Feb 2018 08:32) (99% - 99%)      02-17-18 @ 07:01  -  02-18-18 @ 07:00  --------------------------------------------------------  IN: 1800 mL / OUT: 1 mL / NET: 1799 mL    02-18-18 @ 07:01  -  02-18-18 @ 15:37  --------------------------------------------------------  IN: 0 mL / OUT: 1 mL / NET: -1 mL            Consultant(s) Notes Reviewed:  [x ] YES  [ ] NO          MEDICATIONS  (STANDING):  chlorhexidine 0.12% Liquid 15 milliLiter(s) Swish and Spit two times a day  heparin  Injectable 5000 Unit(s) SubCutaneous every 8 hours  meropenem  IVPB      meropenem  IVPB 500 milliGRAM(s) IV Intermittent every 8 hours  pantoprazole   Suspension 40 milliGRAM(s) Oral before breakfast  senna 2 Tablet(s) Oral at bedtime    MEDICATIONS  (PRN):  acetaminophen    Suspension 650 milliGRAM(s) Oral every 4 hours PRN For Temp greater than 38 C (100.4 F)  lactulose Syrup 15 Gram(s) Oral two times a day PRN constipation      LABS                          12.1   5.77  )-----------( 300      ( 17 Feb 2018 08:04 )             35.1     02-17    139  |  102  |  18  ----------------------------<  125<H>  4.8   |  28  |  0.5<L>    Ca    10.0      17 Feb 2018 08:04            Lactate Trend        CAPILLARY BLOOD GLUCOSE            RADIOLOGY & ADDITIONAL TESTS:    Imaging Personally Reviewed:  [ ] YES  [ ] NO    HEALTH ISSUES - PROBLEM Dx:  Acute cystitis without hematuria: Acute cystitis without hematuria  Encephalopathy: Encephalopathy  Drug overdose, undetermined intent, initial encounter: Drug overdose, undetermined intent, initial encounter  Acute respiratory failure, unspecified whether with hypoxia or hypercapnia: Acute respiratory failure, unspecified whether with hypoxia or hypercapnia            Physical Exam:   · Constitutional	detailed exam	  · Constitutional Details	no distress	  · Neck	detailed exam	  · Neck Details	Trach in place	  · Respiratory	detailed exam	  · Respiratory Details	good air movement	  · Cardiovascular	detailed exam	  · Cardiovascular Details	regular rate and rhythm

## 2018-02-19 RX ADMIN — MEROPENEM 100 MILLIGRAM(S): 1 INJECTION INTRAVENOUS at 14:56

## 2018-02-19 RX ADMIN — HEPARIN SODIUM 5000 UNIT(S): 5000 INJECTION INTRAVENOUS; SUBCUTANEOUS at 21:12

## 2018-02-19 RX ADMIN — CHLORHEXIDINE GLUCONATE 15 MILLILITER(S): 213 SOLUTION TOPICAL at 05:12

## 2018-02-19 RX ADMIN — HEPARIN SODIUM 5000 UNIT(S): 5000 INJECTION INTRAVENOUS; SUBCUTANEOUS at 05:11

## 2018-02-19 RX ADMIN — HEPARIN SODIUM 5000 UNIT(S): 5000 INJECTION INTRAVENOUS; SUBCUTANEOUS at 14:56

## 2018-02-19 RX ADMIN — MEROPENEM 100 MILLIGRAM(S): 1 INJECTION INTRAVENOUS at 21:12

## 2018-02-19 RX ADMIN — CHLORHEXIDINE GLUCONATE 15 MILLILITER(S): 213 SOLUTION TOPICAL at 17:50

## 2018-02-19 RX ADMIN — PANTOPRAZOLE SODIUM 40 MILLIGRAM(S): 20 TABLET, DELAYED RELEASE ORAL at 05:27

## 2018-02-19 RX ADMIN — MEROPENEM 100 MILLIGRAM(S): 1 INJECTION INTRAVENOUS at 05:12

## 2018-02-19 NOTE — PROGRESS NOTE ADULT - SUBJECTIVE AND OBJECTIVE BOX
SUBJECTIVE:    Patient is a 27y old Female who presents with a chief complaint of altered mental status.   Currently admitted to medicine with the primary diagnosis of AMS secondary to substance abuse/overdose.    Today is hospital day 25d. This morning she is resting comfortably in bed and reports no new issues or overnight events.     PAST MEDICAL & SURGICAL HISTORY    SOCIAL HISTORY:  Negative for smoking/alcohol/drug use.     ALLERGIES:  No Known Allergies    MEDICATIONS:  STANDING MEDICATIONS  chlorhexidine 0.12% Liquid 15 milliLiter(s) Swish and Spit two times a day  heparin  Injectable 5000 Unit(s) SubCutaneous every 8 hours  meropenem  IVPB      meropenem  IVPB 500 milliGRAM(s) IV Intermittent every 8 hours  pantoprazole   Suspension 40 milliGRAM(s) Oral before breakfast  senna 2 Tablet(s) Oral at bedtime    PRN MEDICATIONS  acetaminophen    Suspension 650 milliGRAM(s) Oral every 4 hours PRN  lactulose Syrup 15 Gram(s) Oral two times a day PRN    VITALS:   T(F): 99  HR: 102  BP: 136/85  RR: 18  SpO2: 99%    LABS:                        RADIOLOGY:    PHYSICAL EXAM:  GEN: No acute distress  LUNGS: Clear to auscultation bilaterally   HEART: S1/S2 present. RRR.   ABD: Soft, non-tender, non-distended. Bowel sounds present  EXT: NC/NC/NE/2+PP/ORDOÑEZ  NEURO: AAOX3 SUBJECTIVE:    Patient is a 27y old Female who presents with a chief complaint of altered mental status.   Currently admitted to medicine with the primary diagnosis of AMS secondary to substance abuse/overdose.    Today is hospital day 25d. This morning she is resting comfortably in bed and reports no new issues or overnight events.     PAST MEDICAL & SURGICAL HISTORY    SOCIAL HISTORY:  Negative for smoking/alcohol/drug use.     ALLERGIES:  No Known Allergies    MEDICATIONS:  STANDING MEDICATIONS  chlorhexidine 0.12% Liquid 15 milliLiter(s) Swish and Spit two times a day  heparin  Injectable 5000 Unit(s) SubCutaneous every 8 hours  meropenem  IVPB      meropenem  IVPB 500 milliGRAM(s) IV Intermittent every 8 hours  pantoprazole   Suspension 40 milliGRAM(s) Oral before breakfast  senna 2 Tablet(s) Oral at bedtime    PRN MEDICATIONS  acetaminophen    Suspension 650 milliGRAM(s) Oral every 4 hours PRN  lactulose Syrup 15 Gram(s) Oral two times a day PRN    VITALS:   T(F): 99  HR: 102  BP: 136/85  RR: 18  SpO2: 99%    LABS:                        RADIOLOGY:    PHYSICAL EXAM:  GEN: No acute distress  LUNGS: Clear to auscultation bilaterally   HEART: S1/S2 present. RRR.   ABD: Soft, non-tender, non-distended. Bowel sounds present  EXT: Right UE contractures present.   NEURO: Awake but not following commands.

## 2018-02-19 NOTE — PROGRESS NOTE ADULT - ASSESSMENT
Assessment and Plan:   Patient is a 27y old  Female who presents with a chief complaint of mental status change. She is been treated for encephalopathy likely secondary to Substance  abuse and overdose. Patient is S/P extubation and currently status trqach and status peg. having persistent fever and treated for UTI also.    1. AMS most likely secondary to drug overdose   - Patient trached at this point.    - continue to monitor for responsiveness    - Intent of overdose not determined at this point.     2. ARF secondary to drug overdose and respiratory depression   - s/p Trach   - Continue Trachea care as per unit routine    - Follow with Pulmonary.     3. Fevers secondary to cystitis   - Urine culture positive for E.coli   - c/w meropenem    DVT ppx - sq heparin  GI ppx Assessment and Plan:   Patient is a 27y old  Female who presents with a chief complaint of mental status change. She is been treated for encephalopathy likely secondary to Substance  abuse and overdose. Patient is S/P extubation and currently status trach and status post peg. having persistent fever and treated for UTI also.    1. AMS most likely secondary to drug overdose   - Patient trached at this point.    - continue to monitor for responsiveness    - Intent of overdose not determined at this point.     2. ARF secondary to drug overdose and respiratory depression   - s/p Trach   - Continue Trachea care as per unit routine    - Follow with Pulmonary.     3. Fevers secondary to cystitis   - Urine culture positive for E.coli   - c/w meropenem    DVT ppx - sq heparin  GI ppx

## 2018-02-20 LAB
ANION GAP SERPL CALC-SCNC: 9 MMOL/L — SIGNIFICANT CHANGE UP (ref 7–14)
BUN SERPL-MCNC: 14 MG/DL — SIGNIFICANT CHANGE UP (ref 10–20)
CALCIUM SERPL-MCNC: 9.4 MG/DL — SIGNIFICANT CHANGE UP (ref 8.5–10.1)
CHLORIDE SERPL-SCNC: 102 MMOL/L — SIGNIFICANT CHANGE UP (ref 98–110)
CO2 SERPL-SCNC: 25 MMOL/L — SIGNIFICANT CHANGE UP (ref 17–32)
CREAT SERPL-MCNC: 0.4 MG/DL — LOW (ref 0.7–1.5)
GLUCOSE SERPL-MCNC: 123 MG/DL — HIGH (ref 70–110)
HCT VFR BLD CALC: 33.1 % — LOW (ref 37–47)
HGB BLD-MCNC: 11.6 G/DL — LOW (ref 14–18)
MCHC RBC-ENTMCNC: 32 PG — HIGH (ref 27–31)
MCHC RBC-ENTMCNC: 35 G/DL — SIGNIFICANT CHANGE UP (ref 32–37)
MCV RBC AUTO: 91.4 FL — HIGH (ref 81–91)
NRBC # BLD: 0 /100 WBCS — SIGNIFICANT CHANGE UP (ref 0–0)
PLATELET # BLD AUTO: 227 K/UL — SIGNIFICANT CHANGE UP (ref 130–400)
POTASSIUM SERPL-MCNC: 3.8 MMOL/L — SIGNIFICANT CHANGE UP (ref 3.5–5)
POTASSIUM SERPL-SCNC: 3.8 MMOL/L — SIGNIFICANT CHANGE UP (ref 3.5–5)
RBC # BLD: 3.62 M/UL — LOW (ref 4.2–5.4)
RBC # FLD: 12.6 % — SIGNIFICANT CHANGE UP (ref 11.5–14.5)
SODIUM SERPL-SCNC: 136 MMOL/L — SIGNIFICANT CHANGE UP (ref 135–146)
WBC # BLD: 5.33 K/UL — SIGNIFICANT CHANGE UP (ref 4.8–10.8)
WBC # FLD AUTO: 5.33 K/UL — SIGNIFICANT CHANGE UP (ref 4.8–10.8)

## 2018-02-20 RX ADMIN — MEROPENEM 100 MILLIGRAM(S): 1 INJECTION INTRAVENOUS at 05:12

## 2018-02-20 RX ADMIN — MEROPENEM 100 MILLIGRAM(S): 1 INJECTION INTRAVENOUS at 15:05

## 2018-02-20 RX ADMIN — HEPARIN SODIUM 5000 UNIT(S): 5000 INJECTION INTRAVENOUS; SUBCUTANEOUS at 15:03

## 2018-02-20 RX ADMIN — CHLORHEXIDINE GLUCONATE 15 MILLILITER(S): 213 SOLUTION TOPICAL at 05:11

## 2018-02-20 RX ADMIN — MEROPENEM 100 MILLIGRAM(S): 1 INJECTION INTRAVENOUS at 21:35

## 2018-02-20 RX ADMIN — CHLORHEXIDINE GLUCONATE 15 MILLILITER(S): 213 SOLUTION TOPICAL at 18:12

## 2018-02-20 RX ADMIN — HEPARIN SODIUM 5000 UNIT(S): 5000 INJECTION INTRAVENOUS; SUBCUTANEOUS at 21:34

## 2018-02-20 RX ADMIN — HEPARIN SODIUM 5000 UNIT(S): 5000 INJECTION INTRAVENOUS; SUBCUTANEOUS at 05:12

## 2018-02-20 RX ADMIN — PANTOPRAZOLE SODIUM 40 MILLIGRAM(S): 20 TABLET, DELAYED RELEASE ORAL at 06:05

## 2018-02-20 NOTE — CHART NOTE - NSCHARTNOTEFT_GEN_A_CORE
Registered Dietitian Follow-Up  LIMITED- TD-10A    ***Scroll to the bottom for RD recommendation***    Patient Profile Reviewed                           Yes [x]   No []  Nutrition History Previously Obtained        Yes [x]  No []           PERTINENT MEDICAL INFORMATIONS:  (1) Recap: Patient is a 27y old  Female who presents with a chief complaint of mental status change. She is been treated for encephalopathy likely secondary to Substance  abuse and overdose. Patient is S/P extubation and currently status trach and status post peg. having persistent fever and treated for UTI also.  (2) Pt is trached to t-tube, for acute encephaopathy 2/2 drug overdose, and ARF 2/2 drug overdose and respiratory depression  (3) urine culture + for E-coli.  (4) D/C planning      PERTINENT SUBJECTIVE INFORMATION:  (1) Per RN, pt has been tolerating and meeting all scheduled feeds >2 days period.  (2) Active BM per mother. Mother questioning continue lactulose/laxative use.  (3) labs and meds reviewed. Pt has been on Jevity 1.2 at 240cc q4hr. Trached to T-tube with G tube.  (4) SKin intact per EMR.  (5) per RN, pt is more awake than last week, but not following commands and is involuntary moving arms/legs.   (6) Per Mother, pt's weight PTP was ~155#. BEDSCALE measure today says 147#. Likely weight loss through LOS.        [id number] kcal/day  71-85g/day   Both remains appropriate        Not at risk.

## 2018-02-20 NOTE — PROGRESS NOTE ADULT - SUBJECTIVE AND OBJECTIVE BOX
infectious diseases progress note:  MICHELLE DRISCOLL is a 27yFemale patient    OVERDOSE    Acute cystitis without hematuria  Encephalopathy  Drug overdose, undetermined intent, initial encounter  Acute respiratory failure, unspecified whether with hypoxia or hypercapnia      ROS:  CONSTITUTIONAL:  Negative fever or chills, feels well, good appetite  EYES:  Negative  blurry vision or double vision  CARDIOVASCULAR:  Negative for chest pain or palpitations  RESPIRATORY:  Negative for cough, wheezing, or SOB   GASTROINTESTINAL:  Negative for nausea, vomiting, diarrhea, constipation, or abdominal pain  GENITOURINARY:  Negative frequency, urgency or dysuria  NEUROLOGIC:  No headache, confusion, dizziness, lightheadedness    Allergies    No Known Allergies    Intolerances        ANTIBIOTICS/RELEVANT:  antimicrobials  meropenem  IVPB      meropenem  IVPB 500 milliGRAM(s) IV Intermittent every 8 hours    immunologic:    OTHER:  acetaminophen    Suspension 650 milliGRAM(s) Oral every 4 hours PRN  chlorhexidine 0.12% Liquid 15 milliLiter(s) Swish and Spit two times a day  heparin  Injectable 5000 Unit(s) SubCutaneous every 8 hours  lactulose Syrup 15 Gram(s) Oral two times a day PRN  pantoprazole   Suspension 40 milliGRAM(s) Oral before breakfast      Objective:  T(F): 99 (02-20-18 @ 08:03), Max: 99 (02-20-18 @ 08:03)  HR: 114 (02-20-18 @ 08:03) (83 - 114)  BP: 148/71 (02-20-18 @ 08:03) (115/70 - 151/88)  RR: 18 (02-20-18 @ 08:03) (18 - 83)  SpO2: 99% (02-19-18 @ 17:53) (99% - 99%)    PHYSICAL EXAM  Constitutional: Trach  Eyes:SHELLIE, EOMI  Ear/Nose/Throat: no oral lesion, no sinus tenderness on percussion	  Neck:no JVD, no lymphadenopathy, supple  Respiratory: CTA dinesh  Cardiovascular: S1S2 RRR, no murmurs  Gastrointestinal:soft, (+) BS, no HSM  Extremities:no e/e/c    02-20    136  |  102  |  14  ----------------------------<  123<H>  3.8   |  25  |  0.4<L>                                11.6   5.33  )-----------( 227      ( 20 Feb 2018 08:24 )             33.1

## 2018-02-20 NOTE — PROGRESS NOTE ADULT - ASSESSMENT
Assessment and Plan:   Patient is a 27y old  Female who presents with a chief complaint of mental status change. She is been treated for encephalopathy likely secondary to Substance  abuse and overdose. Patient is S/P extubation and currently status trach and status post peg. having persistent fever and treated for UTI also.    1. AMS most likely secondary to drug overdose   - Patient trached at this point.    - continue to monitor for responsiveness    - Intent of overdose not determined at this point.     2. ARF secondary to drug overdose and respiratory depression   - s/p Trach   - Continue Trachea care as per unit routine    - Follow with Pulmonary.     3. Fevers secondary to cystitis   - Urine culture positive for E.coli   - c/w meropenem   - f/u further ID recommendations.     DVT ppx - sq heparin  GI ppx Assessment and Plan:   Patient is a 27y old  Female who presents with a chief complaint of mental status change. She is been treated for encephalopathy likely secondary to Substance  abuse and overdose. Patient is S/P extubation and currently status trach and status post peg. having persistent fever and treated for UTI also.    1. AMS most likely secondary to drug overdose   - Patient trached at this point.    - continue to monitor for responsiveness    - Intent of overdose not determined at this point.     2. ARF secondary to drug overdose and respiratory depression   - s/p Trach   - Continue Trachea care as per unit routine    - Follow with Pulmonary.     3. Fevers secondary to cystitis   - Urine culture positive for E.coli   - c/w meropenem   - f/u further ID recommendations.     DVT ppx - sq heparin  GI ppx  Dispo planning

## 2018-02-20 NOTE — PROGRESS NOTE ADULT - ASSESSMENT
S/P Trach    Continue:  meropenem  IVPB 500 milliGRAM(s) IV Intermittent every 8 hours  for E coli UTI

## 2018-02-20 NOTE — PROGRESS NOTE ADULT - SUBJECTIVE AND OBJECTIVE BOX
SUBJECTIVE:    Patient is a 27y old Female who presents with a chief complaint of altered mental status.   Currently admitted to medicine with the primary diagnosis of AMS secondary to substance abuse/overdose.    Today is hospital day 25d. This morning she is resting comfortably in bed and reports no new issues or overnight events.     PAST MEDICAL & SURGICAL HISTORY - none     SOCIAL HISTORY:  Negative for smoking/alcohol/drug use.     ALLERGIES:  No Known Allergies    MEDICATIONS:  STANDING MEDICATIONS  chlorhexidine 0.12% Liquid 15 milliLiter(s) Swish and Spit two times a day  heparin  Injectable 5000 Unit(s) SubCutaneous every 8 hours  meropenem  IVPB      meropenem  IVPB 500 milliGRAM(s) IV Intermittent every 8 hours  pantoprazole   Suspension 40 milliGRAM(s) Oral before breakfast  senna 2 Tablet(s) Oral at bedtime    PRN MEDICATIONS  acetaminophen    Suspension 650 milliGRAM(s) Oral every 4 hours PRN  lactulose Syrup 15 Gram(s) Oral two times a day PRN    VITALS:   T(F): 99  HR: 102  BP: 136/85  RR: 18  SpO2: 99%    LABS:                          11.6   5.33  )-----------( 227      ( 20 Feb 2018 08:24 )             33.1         PHYSICAL EXAM:  GEN: No acute distress, unresponsive  LUNGS: Clear to auscultation bilaterally   HEART: S1/S2 present. RRR.   ABD: Soft, non-tender, non-distended. Bowel sounds present  EXT: Right UE contractures present.   NEURO: Awake but not following commands.

## 2018-02-20 NOTE — PROGRESS NOTE ADULT - SUBJECTIVE AND OBJECTIVE BOX
SUBJECTIVE:    Patient is a 27y old Female who presents with a chief complaint of   Currently admitted to medicine with the primary diagnosis of  anoxic brain injury most likely secondary to drug overdose.   Today is hospital day 26d. This morning she is resting comfortably in bed and no acute events overnight.     PAST MEDICAL & SURGICAL HISTORY    SOCIAL HISTORY:  Negative for smoking/alcohol/drug use.     ALLERGIES:  No Known Allergies    MEDICATIONS:  STANDING MEDICATIONS  chlorhexidine 0.12% Liquid 15 milliLiter(s) Swish and Spit two times a day  heparin  Injectable 5000 Unit(s) SubCutaneous every 8 hours  meropenem  IVPB      meropenem  IVPB 500 milliGRAM(s) IV Intermittent every 8 hours  pantoprazole   Suspension 40 milliGRAM(s) Oral before breakfast    PRN MEDICATIONS  acetaminophen    Suspension 650 milliGRAM(s) Oral every 4 hours PRN  lactulose Syrup 15 Gram(s) Oral two times a day PRN    VITALS:   T(F): 99  HR: 110  BP: 148/71  RR: 18  SpO2: 98%    LABS:                        11.6   5.33  )-----------( 227      ( 20 Feb 2018 08:24 )             33.1     02-20    136  |  102  |  14  ----------------------------<  123<H>  3.8   |  25  |  0.4<L>    Ca    9.4      20 Feb 2018 08:24        RADIOLOGY:        PHYSICAL EXAM:  GEN: No acute distress  LUNGS: Clear to auscultation bilaterally   HEART: S1/S2 present. RRR.   ABD: Soft, non-tender, non-distended. Bowel sounds present  EXT: Right upper extremity contractures present.   NEURO: Awake but is not following commands.

## 2018-02-20 NOTE — PROGRESS NOTE ADULT - ASSESSMENT
Assessment and Plan:   Patient is a 27y old  Female who presents with a chief complaint of mental status change. She is been treated for encephalopathy likely secondary to Substance  abuse and overdose. Patient is S/P extubation and currently status trach and status post peg. having persistent fever and treated for UTI also.    1. Acute Encephalopathy likely secondary to drug overdose. mother concerns patient may have taken something while she was working at night shifts.    - Patient trached at this point.    - continue to monitor for responsiveness    - Intent of overdose not determined at this point.     2. ARF secondary to drug overdose and respiratory depression   - s/p Trach   - Continue Trachea care as per unit routine    -  Pulmonary follows as needed    3. Fevers secondary to cystitis   - Urine culture positive for E.coli   - c/w meropenem, complete course    DVT ppx - sq heparin  GI ppx    D/W mother.   D/C planning

## 2018-02-21 LAB
ANION GAP SERPL CALC-SCNC: 7 MMOL/L — SIGNIFICANT CHANGE UP (ref 7–14)
BUN SERPL-MCNC: 14 MG/DL — SIGNIFICANT CHANGE UP (ref 10–20)
CALCIUM SERPL-MCNC: 10 MG/DL — SIGNIFICANT CHANGE UP (ref 8.5–10.1)
CHLORIDE SERPL-SCNC: 103 MMOL/L — SIGNIFICANT CHANGE UP (ref 98–110)
CO2 SERPL-SCNC: 27 MMOL/L — SIGNIFICANT CHANGE UP (ref 17–32)
CREAT SERPL-MCNC: 0.4 MG/DL — LOW (ref 0.7–1.5)
GLUCOSE SERPL-MCNC: 134 MG/DL — HIGH (ref 70–110)
HCT VFR BLD CALC: 35.4 % — LOW (ref 37–47)
HGB BLD-MCNC: 12.3 G/DL — LOW (ref 14–18)
MCHC RBC-ENTMCNC: 31.7 PG — HIGH (ref 27–31)
MCHC RBC-ENTMCNC: 34.7 G/DL — SIGNIFICANT CHANGE UP (ref 32–37)
MCV RBC AUTO: 91.2 FL — HIGH (ref 81–91)
NRBC # BLD: 0 /100 WBCS — SIGNIFICANT CHANGE UP (ref 0–0)
PLATELET # BLD AUTO: 255 K/UL — SIGNIFICANT CHANGE UP (ref 130–400)
POTASSIUM SERPL-MCNC: 3.9 MMOL/L — SIGNIFICANT CHANGE UP (ref 3.5–5)
POTASSIUM SERPL-SCNC: 3.9 MMOL/L — SIGNIFICANT CHANGE UP (ref 3.5–5)
RBC # BLD: 3.88 M/UL — LOW (ref 4.2–5.4)
RBC # FLD: 12.9 % — SIGNIFICANT CHANGE UP (ref 11.5–14.5)
SODIUM SERPL-SCNC: 137 MMOL/L — SIGNIFICANT CHANGE UP (ref 135–146)
WBC # BLD: 6 K/UL — SIGNIFICANT CHANGE UP (ref 4.8–10.8)
WBC # FLD AUTO: 6 K/UL — SIGNIFICANT CHANGE UP (ref 4.8–10.8)

## 2018-02-21 RX ADMIN — HEPARIN SODIUM 5000 UNIT(S): 5000 INJECTION INTRAVENOUS; SUBCUTANEOUS at 05:12

## 2018-02-21 RX ADMIN — CHLORHEXIDINE GLUCONATE 15 MILLILITER(S): 213 SOLUTION TOPICAL at 18:35

## 2018-02-21 RX ADMIN — CHLORHEXIDINE GLUCONATE 15 MILLILITER(S): 213 SOLUTION TOPICAL at 05:12

## 2018-02-21 RX ADMIN — HEPARIN SODIUM 5000 UNIT(S): 5000 INJECTION INTRAVENOUS; SUBCUTANEOUS at 21:12

## 2018-02-21 RX ADMIN — HEPARIN SODIUM 5000 UNIT(S): 5000 INJECTION INTRAVENOUS; SUBCUTANEOUS at 14:59

## 2018-02-21 RX ADMIN — MEROPENEM 100 MILLIGRAM(S): 1 INJECTION INTRAVENOUS at 05:13

## 2018-02-21 RX ADMIN — MEROPENEM 100 MILLIGRAM(S): 1 INJECTION INTRAVENOUS at 14:59

## 2018-02-21 RX ADMIN — MEROPENEM 100 MILLIGRAM(S): 1 INJECTION INTRAVENOUS at 21:12

## 2018-02-21 RX ADMIN — PANTOPRAZOLE SODIUM 40 MILLIGRAM(S): 20 TABLET, DELAYED RELEASE ORAL at 05:13

## 2018-02-22 LAB
ANION GAP SERPL CALC-SCNC: 8 MMOL/L — SIGNIFICANT CHANGE UP (ref 7–14)
BUN SERPL-MCNC: 12 MG/DL — SIGNIFICANT CHANGE UP (ref 10–20)
CALCIUM SERPL-MCNC: 9.8 MG/DL — SIGNIFICANT CHANGE UP (ref 8.5–10.1)
CHLORIDE SERPL-SCNC: 98 MMOL/L — SIGNIFICANT CHANGE UP (ref 98–110)
CO2 SERPL-SCNC: 27 MMOL/L — SIGNIFICANT CHANGE UP (ref 17–32)
CREAT SERPL-MCNC: 0.3 MG/DL — LOW (ref 0.7–1.5)
GLUCOSE SERPL-MCNC: 116 MG/DL — HIGH (ref 70–110)
HCT VFR BLD CALC: 34.1 % — LOW (ref 37–47)
HGB BLD-MCNC: 11.9 G/DL — LOW (ref 14–18)
MCHC RBC-ENTMCNC: 31.2 PG — HIGH (ref 27–31)
MCHC RBC-ENTMCNC: 34.9 G/DL — SIGNIFICANT CHANGE UP (ref 32–37)
MCV RBC AUTO: 89.5 FL — SIGNIFICANT CHANGE UP (ref 81–91)
NRBC # BLD: 0 /100 WBCS — SIGNIFICANT CHANGE UP (ref 0–0)
PLATELET # BLD AUTO: 235 K/UL — SIGNIFICANT CHANGE UP (ref 130–400)
POTASSIUM SERPL-MCNC: 4.7 MMOL/L — SIGNIFICANT CHANGE UP (ref 3.5–5)
POTASSIUM SERPL-SCNC: 4.7 MMOL/L — SIGNIFICANT CHANGE UP (ref 3.5–5)
RBC # BLD: 3.81 M/UL — LOW (ref 4.2–5.4)
RBC # FLD: 12.9 % — SIGNIFICANT CHANGE UP (ref 11.5–14.5)
SODIUM SERPL-SCNC: 133 MMOL/L — LOW (ref 135–146)
WBC # BLD: 6.29 K/UL — SIGNIFICANT CHANGE UP (ref 4.8–10.8)
WBC # FLD AUTO: 6.29 K/UL — SIGNIFICANT CHANGE UP (ref 4.8–10.8)

## 2018-02-22 RX ADMIN — CHLORHEXIDINE GLUCONATE 15 MILLILITER(S): 213 SOLUTION TOPICAL at 18:08

## 2018-02-22 RX ADMIN — HEPARIN SODIUM 5000 UNIT(S): 5000 INJECTION INTRAVENOUS; SUBCUTANEOUS at 21:25

## 2018-02-22 RX ADMIN — MEROPENEM 100 MILLIGRAM(S): 1 INJECTION INTRAVENOUS at 14:44

## 2018-02-22 RX ADMIN — HEPARIN SODIUM 5000 UNIT(S): 5000 INJECTION INTRAVENOUS; SUBCUTANEOUS at 14:44

## 2018-02-22 RX ADMIN — HEPARIN SODIUM 5000 UNIT(S): 5000 INJECTION INTRAVENOUS; SUBCUTANEOUS at 05:00

## 2018-02-22 RX ADMIN — PANTOPRAZOLE SODIUM 40 MILLIGRAM(S): 20 TABLET, DELAYED RELEASE ORAL at 05:01

## 2018-02-22 RX ADMIN — MEROPENEM 100 MILLIGRAM(S): 1 INJECTION INTRAVENOUS at 21:25

## 2018-02-22 RX ADMIN — CHLORHEXIDINE GLUCONATE 15 MILLILITER(S): 213 SOLUTION TOPICAL at 05:00

## 2018-02-22 RX ADMIN — MEROPENEM 100 MILLIGRAM(S): 1 INJECTION INTRAVENOUS at 05:01

## 2018-02-22 NOTE — PROGRESS NOTE ADULT - SUBJECTIVE AND OBJECTIVE BOX
infectious diseases progress note:  MICHELLE DRISCOLL is a 27yFemale patient    OVERDOSE    Acute cystitis without hematuria  Encephalopathy  Drug overdose, undetermined intent, initial encounter  Acute respiratory failure, unspecified whether with hypoxia or hypercapnia      ROS:  CONSTITUTIONAL:  Negative fever or chills, feels well, good appetite  EYES:  Negative  blurry vision or double vision  CARDIOVASCULAR:  Negative for chest pain or palpitations  RESPIRATORY:  Negative for cough, wheezing, or SOB   GASTROINTESTINAL:  Negative for nausea, vomiting, diarrhea, constipation, or abdominal pain  GENITOURINARY:  Negative frequency, urgency or dysuria  NEUROLOGIC:  No headache, confusion, dizziness, lightheadedness    Allergies    No Known Allergies    Intolerances        ANTIBIOTICS/RELEVANT:  antimicrobials  meropenem  IVPB      meropenem  IVPB 500 milliGRAM(s) IV Intermittent every 8 hours    immunologic:    OTHER:  acetaminophen    Suspension 650 milliGRAM(s) Oral every 4 hours PRN  chlorhexidine 0.12% Liquid 15 milliLiter(s) Swish and Spit two times a day  heparin  Injectable 5000 Unit(s) SubCutaneous every 8 hours  lactulose Syrup 15 Gram(s) Oral two times a day PRN  pantoprazole   Suspension 40 milliGRAM(s) Oral before breakfast      Objective:  T(F): 97.2 (02-22-18 @ 07:42), Max: 98.7 (02-21-18 @ 15:22)  HR: 91 (02-22-18 @ 07:42) (91 - 104)  BP: 113/82 (02-22-18 @ 07:42) (105/55 - 168/86)  RR: 20 (02-22-18 @ 07:42) (20 - 20)  SpO2: --    PHYSICAL EXAM  Constitutional:  Eyes:SHELLIE, EOMI  Ear/Nose/Throat: no oral lesion, no sinus tenderness on percussion	  Neck:no JVD, no lymphadenopathy, supple  Respiratory: CTA dinesh  Cardiovascular: S1S2 RRR, no murmurs  Gastrointestinal:soft, (+) BS, no HSM  Extremities:no e/e/c    02-22    133<L>  |  98  |  12  ----------------------------<  116<H>  4.7   |  27  |  0.3<L>                                11.9   6.29  )-----------( 235      ( 22 Feb 2018 08:07 )             34.1

## 2018-02-22 NOTE — PROGRESS NOTE ADULT - SUBJECTIVE AND OBJECTIVE BOX
SUBJECTIVE:    Patient is a 27y old Female who presents with a chief complaint of AMS.   Currently admitted to medicine with the primary diagnosis of  AMS secondary to drug overdose.   Today is hospital day 28d. This morning she is resting comfortably in bed and reports no new issues or overnight events.     PAST MEDICAL & SURGICAL HISTORY    SOCIAL HISTORY:  Negative for smoking/alcohol/drug use.     ALLERGIES:  No Known Allergies    MEDICATIONS:  STANDING MEDICATIONS  chlorhexidine 0.12% Liquid 15 milliLiter(s) Swish and Spit two times a day  heparin  Injectable 5000 Unit(s) SubCutaneous every 8 hours  meropenem  IVPB      meropenem  IVPB 500 milliGRAM(s) IV Intermittent every 8 hours  pantoprazole   Suspension 40 milliGRAM(s) Oral before breakfast    PRN MEDICATIONS  acetaminophen    Suspension 650 milliGRAM(s) Oral every 4 hours PRN  lactulose Syrup 15 Gram(s) Oral two times a day PRN    VITALS:   T(F): 98.7  HR: 109  BP: 119/83  RR: 18  SpO2: 99%    LABS:                        11.9   6.29  )-----------( 235      ( 22 Feb 2018 08:07 )             34.1     02-22    133<L>  |  98  |  12  ----------------------------<  116<H>  4.7   |  27  |  0.3<L>    Ca    9.8      22 Feb 2018 08:07        RADIOLOGY:    < from: Xray Chest 1 View-PORTABLE IMMEDIATE (02.05.18 @ 23:49) >  Since prior, interval removal of enteric tube    Stable left retrocardiac opacity consistent with atelectasis    < end of copied text >      PHYSICAL EXAM:  GEN: No acute distress  LUNGS: PEG in place. Clear to auscultation bilaterally.   HEART: S1/S2 present. RRR.   ABD: Soft, non-tender, non-distended. Bowel sounds present  EXT: NC/NC/NE/2+PP/ORDOÑEZ  NEURO: Awake. Does not answer any questions.

## 2018-02-22 NOTE — PROGRESS NOTE ADULT - ASSESSMENT
Assessment and Plan:   Patient is a 27y old  Female who presents with a chief complaint of mental status change. She is been treated for encephalopathy likely secondary to Substance  abuse and overdose. Patient is S/P extubation and currently status trach and status post peg. having persistent fever and treated for UTI also.    1. Acute Encephalopathy likely secondary to drug overdose. mother concerns patient may have taken something while she was working at night shifts.    - Patient trached at this point.    - continue to monitor for responsiveness    - Intent of overdose not determined at this point.     2. ARF secondary to drug overdose and respiratory depression   - s/p Trach   - Continue Trachea care as per unit routine    -  Pulmonary follows as needed    3. Fevers secondary to cystitis   - Urine culture positive for E.coli   - c/w meropenem, complete course    DVT ppx - sq heparin  GI ppx    D/W mother.   D/C planning ONCE FINANCIAL CLEARANCE IS MADE WITH FAMILY REGARDING PATIENTS PROPERTY

## 2018-02-22 NOTE — PROGRESS NOTE ADULT - ASSESSMENT
Assessment and Plan:   Patient is a 27y old  Female who presents with a chief complaint of mental status change. She is been treated for encephalopathy likely secondary to Substance  abuse and overdose. Patient is S/P extubation and currently status trach and status post peg. having persistent fever and treated for UTI also.    1. AMS most likely secondary to drug overdose   - Patient trached at this point.    - continue to monitor for responsiveness     2. ARF secondary to drug overdose and respiratory depression   - s/p Trach   - Continue Trachea care as per unit routine     3. Fevers secondary to cystitis   - Urine culture positive for E.coli   - Meropenem DCed as per ID.     DVT ppx - sq heparin  GI ppx  Dispo planning

## 2018-02-22 NOTE — PROGRESS NOTE ADULT - ASSESSMENT
Drug overdose; Afebrile with normal wbc; Atelectasis on prior Cxray.  Patient with hyponatremia (133).  Monitor Na.    E coli bactiuria 2/10 (S Meropenem)    On meropenem  IVPB 500 milliGRAM(s) IV Intermittent every 8 hours.  Would D/C meropenem.

## 2018-02-22 NOTE — PROGRESS NOTE ADULT - SUBJECTIVE AND OBJECTIVE BOX
SUBJECTIVE:    Patient is a 27y old Female who presents with a chief complaint of altered mental status.   Currently admitted to medicine with the primary diagnosis of AMS secondary to substance abuse/overdose.    This morning she is resting comfortably in bed and reports no new issues or overnight events.     PAST MEDICAL & SURGICAL HISTORY - none     SOCIAL HISTORY:  Negative for smoking/alcohol/drug use, unknown     ALLERGIES:  No Known Allergies    MEDICATIONS:  STANDING MEDICATIONS  chlorhexidine 0.12% Liquid 15 milliLiter(s) Swish and Spit two times a day  heparin  Injectable 5000 Unit(s) SubCutaneous every 8 hours  meropenem  IVPB      meropenem  IVPB 500 milliGRAM(s) IV Intermittent every 8 hours  pantoprazole   Suspension 40 milliGRAM(s) Oral before breakfast  senna 2 Tablet(s) Oral at bedtime    PRN MEDICATIONS  acetaminophen    Suspension 650 milliGRAM(s) Oral every 4 hours PRN  lactulose Syrup 15 Gram(s) Oral two times a day PRN    VITALS:   Vital Signs Last 24 Hrs  T(C): 36.2 (22 Feb 2018 07:42), Max: 37.1 (21 Feb 2018 15:22)  T(F): 97.2 (22 Feb 2018 07:42), Max: 98.7 (21 Feb 2018 15:22)  HR: 104 (22 Feb 2018 08:10) (91 - 104)  BP: 113/82 (22 Feb 2018 07:42) (105/55 - 168/86)  BP(mean): 92 (22 Feb 2018 07:42) (92 - 92)  RR: 20 (22 Feb 2018 07:42) (20 - 20)  SpO2: 98% (22 Feb 2018 08:10) (98% - 98%)    LABS:                          11.6   5.33  )-----------( 227      ( 20 Feb 2018 08:24 )             33.1         PHYSICAL EXAM:  GEN: No acute distress, unresponsive  LUNGS: Clear to auscultation bilaterally , TRACH IN PLACE  HEART: S1/S2 present. RRR.   ABD: Soft, non-tender, non-distended. Bowel sounds present  EXT: Right UE contractures present.   NEURO: Awake but not following commands.

## 2018-02-23 LAB
ANION GAP SERPL CALC-SCNC: 4 MMOL/L — LOW (ref 7–14)
BUN SERPL-MCNC: 12 MG/DL — SIGNIFICANT CHANGE UP (ref 10–20)
CALCIUM SERPL-MCNC: 9.8 MG/DL — SIGNIFICANT CHANGE UP (ref 8.5–10.1)
CHLORIDE SERPL-SCNC: 100 MMOL/L — SIGNIFICANT CHANGE UP (ref 98–110)
CO2 SERPL-SCNC: 32 MMOL/L — SIGNIFICANT CHANGE UP (ref 17–32)
CREAT SERPL-MCNC: 0.4 MG/DL — LOW (ref 0.7–1.5)
GLUCOSE SERPL-MCNC: 102 MG/DL — SIGNIFICANT CHANGE UP (ref 70–110)
HCT VFR BLD CALC: 34.6 % — LOW (ref 37–47)
HGB BLD-MCNC: 12.1 G/DL — LOW (ref 14–18)
MCHC RBC-ENTMCNC: 31.4 PG — HIGH (ref 27–31)
MCHC RBC-ENTMCNC: 35 G/DL — SIGNIFICANT CHANGE UP (ref 32–37)
MCV RBC AUTO: 89.9 FL — SIGNIFICANT CHANGE UP (ref 81–91)
NRBC # BLD: 0 /100 WBCS — SIGNIFICANT CHANGE UP (ref 0–0)
PLATELET # BLD AUTO: 255 K/UL — SIGNIFICANT CHANGE UP (ref 130–400)
POTASSIUM SERPL-MCNC: 4.4 MMOL/L — SIGNIFICANT CHANGE UP (ref 3.5–5)
POTASSIUM SERPL-SCNC: 4.4 MMOL/L — SIGNIFICANT CHANGE UP (ref 3.5–5)
RBC # BLD: 3.85 M/UL — LOW (ref 4.2–5.4)
RBC # FLD: 12.9 % — SIGNIFICANT CHANGE UP (ref 11.5–14.5)
SODIUM SERPL-SCNC: 136 MMOL/L — SIGNIFICANT CHANGE UP (ref 135–146)
WBC # BLD: 5.84 K/UL — SIGNIFICANT CHANGE UP (ref 4.8–10.8)
WBC # FLD AUTO: 5.84 K/UL — SIGNIFICANT CHANGE UP (ref 4.8–10.8)

## 2018-02-23 RX ADMIN — HEPARIN SODIUM 5000 UNIT(S): 5000 INJECTION INTRAVENOUS; SUBCUTANEOUS at 21:41

## 2018-02-23 RX ADMIN — CHLORHEXIDINE GLUCONATE 15 MILLILITER(S): 213 SOLUTION TOPICAL at 05:23

## 2018-02-23 RX ADMIN — PANTOPRAZOLE SODIUM 40 MILLIGRAM(S): 20 TABLET, DELAYED RELEASE ORAL at 06:52

## 2018-02-23 RX ADMIN — MEROPENEM 100 MILLIGRAM(S): 1 INJECTION INTRAVENOUS at 05:23

## 2018-02-23 RX ADMIN — HEPARIN SODIUM 5000 UNIT(S): 5000 INJECTION INTRAVENOUS; SUBCUTANEOUS at 05:23

## 2018-02-23 RX ADMIN — HEPARIN SODIUM 5000 UNIT(S): 5000 INJECTION INTRAVENOUS; SUBCUTANEOUS at 14:41

## 2018-02-23 RX ADMIN — CHLORHEXIDINE GLUCONATE 15 MILLILITER(S): 213 SOLUTION TOPICAL at 18:19

## 2018-02-23 NOTE — PROGRESS NOTE ADULT - ASSESSMENT
Assessment and Plan:   Patient is a 27y old  Female who presents with a chief complaint of mental status change. She is been treated for encephalopathy likely secondary to Substance  abuse and overdose. Patient is S/P extubation and currently status trach and status post peg. having persistent fever and treated for UTI also.    1. AMS most likely secondary to drug overdose   - Patient trached at this point.    - continue to monitor for responsiveness     2. ARF secondary to drug overdose and respiratory depression   - s/p Trach   - Continue Trachea care as per unit routine     3. Fevers secondary to cystitis   - Urine culture positive for E.coli   - Meropenem DCed as per ID.    - DC planning    DVT ppx - sq heparin  GI ppx  Dispo planning

## 2018-02-23 NOTE — PROGRESS NOTE ADULT - ASSESSMENT
Assessment and Plan:   Patient is a 27y old  Female who presents with a chief complaint of mental status change. She is been treated for encephalopathy likely secondary to Substance  abuse and overdose. Patient is S/P extubation and currently status trach and status post peg. having persistent fever and treated for UTI also.    1. Acute Encephalopathy likely secondary to drug overdose. mother concerns patient may have taken something while she was working at night shifts.    - Patient trached at this point.    - continue to monitor for responsiveness        2. ARF secondary to drug overdose and respiratory depression   - s/p Trach   - Continue Trachea care as per unit routine    -  Pulmonary follows as needed    3. Fevers secondary to cystitis   - Urine culture positive for E.coli   - c/w meropenem, complete course  - D/C Meropenem after today's dosing.     DVT ppx - sq heparin  GI ppx    D/W mother.   D/C planning ONCE FINANCIAL CLEARANCE IS MADE WITH FAMILY REGARDING PATIENTS PROPERTY .  has been working on this.

## 2018-02-23 NOTE — PROGRESS NOTE ADULT - SUBJECTIVE AND OBJECTIVE BOX
SUBJECTIVE:    Patient is a 27y old Female who presents with a chief complaint of fevers.   Currently admitted to medicine with the primary diagnosis of  AMS secondary to drug overdose further complicated by fevers secondary to cystitis.   Today is hospital day 29d. This morning she is resting comfortably in bed and reports no new issues or overnight events.     PAST MEDICAL & SURGICAL HISTORY    SOCIAL HISTORY:  Negative for smoking/alcohol/drug use.     ALLERGIES:  No Known Allergies    MEDICATIONS:  STANDING MEDICATIONS  chlorhexidine 0.12% Liquid 15 milliLiter(s) Swish and Spit two times a day  heparin  Injectable 5000 Unit(s) SubCutaneous every 8 hours  pantoprazole   Suspension 40 milliGRAM(s) Oral before breakfast    PRN MEDICATIONS  acetaminophen    Suspension 650 milliGRAM(s) Oral every 4 hours PRN  lactulose Syrup 15 Gram(s) Oral two times a day PRN    VITALS:   T(F): 97.8  HR: 114  BP: 149/88  RR: 20  SpO2: 100%    LABS:                        12.1   5.84  )-----------( 255      ( 23 Feb 2018 09:54 )             34.6     02-23    136  |  100  |  12  ----------------------------<  102  4.4   |  32  |  0.4<L>    Ca    9.8      23 Feb 2018 09:54        PHYSICAL EXAM:  GEN: No acute distress  LUNGS: Clear to auscultation bilaterally   HEART: S1/S2 present. RRR.   ABD: Soft, non-tender, non-distended. Bowel sounds present  EXT: Contracture of right upper extremity present. Moving left uppe and b/l lower extremity freely.   NEURO: Awake but not responding to verbal commands or answering any questions.

## 2018-02-23 NOTE — PROGRESS NOTE ADULT - SUBJECTIVE AND OBJECTIVE BOX
infectious diseases progress note:  MICHELLE DRISCOLL is a 27yFemale patient    OVERDOSE    Acute cystitis without hematuria  Encephalopathy  Drug overdose, undetermined intent, initial encounter  Acute respiratory failure, unspecified whether with hypoxia or hypercapnia      ROS:  CONSTITUTIONAL:  Negative fever or chills, feels well, good appetite  EYES:  Negative  blurry vision or double vision  CARDIOVASCULAR:  Negative for chest pain or palpitations  RESPIRATORY:  Negative for cough, wheezing, or SOB   GASTROINTESTINAL:  Negative for nausea, vomiting, diarrhea, constipation, or abdominal pain  GENITOURINARY:  Negative frequency, urgency or dysuria  NEUROLOGIC:  No headache, confusion, dizziness, lightheadedness    Allergies    No Known Allergies    Intolerances        ANTIBIOTICS/RELEVANT:  antimicrobials  meropenem  IVPB      meropenem  IVPB 500 milliGRAM(s) IV Intermittent every 8 hours    immunologic:    OTHER:  acetaminophen    Suspension 650 milliGRAM(s) Oral every 4 hours PRN  chlorhexidine 0.12% Liquid 15 milliLiter(s) Swish and Spit two times a day  heparin  Injectable 5000 Unit(s) SubCutaneous every 8 hours  lactulose Syrup 15 Gram(s) Oral two times a day PRN  pantoprazole   Suspension 40 milliGRAM(s) Oral before breakfast      Objective:  T(F): 97.8 (02-23-18 @ 07:46), Max: 98.7 (02-22-18 @ 15:30)  HR: 114 (02-23-18 @ 07:46) (91 - 114)  BP: 149/88 (02-23-18 @ 07:46) (119/83 - 149/88)  RR: 20 (02-23-18 @ 07:46) (18 - 20)  SpO2: 97% (02-23-18 @ 00:55) (97% - 99%)    PHYSICAL EXAM  Constitutional:  Eyes:SEHLLIE, EOMI  Ear/Nose/Throat: no oral lesion, no sinus tenderness on percussion	  Neck:no JVD, no lymphadenopathy, supple  Respiratory: CTA dinesh  Cardiovascular: S1S2 RRR, no murmurs  Gastrointestinal:soft, (+) BS, no HSM  Extremities:no e/e/c    02-22    133<L>  |  98  |  12  ----------------------------<  116<H>  4.7   |  27  |  0.3<L>                                11.9   6.29  )-----------( 235      ( 22 Feb 2018 08:07 )             34.1

## 2018-02-23 NOTE — PROGRESS NOTE ADULT - SUBJECTIVE AND OBJECTIVE BOX
SUBJECTIVE:    Patient is a 27y old Female who presents with a chief complaint of altered mental status.   Currently admitted to medicine with the primary diagnosis of AMS secondary to substance abuse/overdose.    This morning she is resting comfortably in bed and reports no new issues or overnight events.     PAST MEDICAL & SURGICAL HISTORY - none     SOCIAL HISTORY:  Negative for smoking/alcohol/drug use, unknown     ALLERGIES:  No Known Allergies    MEDICATIONS:  STANDING MEDICATIONS  chlorhexidine 0.12% Liquid 15 milliLiter(s) Swish and Spit two times a day  heparin  Injectable 5000 Unit(s) SubCutaneous every 8 hours  meropenem  IVPB      meropenem  IVPB 500 milliGRAM(s) IV Intermittent every 8 hours  pantoprazole   Suspension 40 milliGRAM(s) Oral before breakfast  senna 2 Tablet(s) Oral at bedtime    PRN MEDICATIONS  acetaminophen    Suspension 650 milliGRAM(s) Oral every 4 hours PRN  lactulose Syrup 15 Gram(s) Oral two times a day PRN    VITALS:   no fever more than 72 hours.     LABS:                          11.6   5.33  )-----------( 227      ( 20 Feb 2018 08:24 )             33.1         PHYSICAL EXAM:  GEN: No acute distress, opens eyes  LUNGS: Clear to auscultation bilaterally , TRACH IN PLACE  HEART: S1/S2 present. RRR.   ABD: Soft, non-tender, non-distended. Bowel sounds present  EXT: Right UE contractures present.   NEURO: Awake but not following commands.

## 2018-02-24 LAB
ANION GAP SERPL CALC-SCNC: 10 MMOL/L — SIGNIFICANT CHANGE UP (ref 7–14)
BUN SERPL-MCNC: 11 MG/DL — SIGNIFICANT CHANGE UP (ref 10–20)
CALCIUM SERPL-MCNC: 10.5 MG/DL — HIGH (ref 8.5–10.1)
CHLORIDE SERPL-SCNC: 101 MMOL/L — SIGNIFICANT CHANGE UP (ref 98–110)
CO2 SERPL-SCNC: 27 MMOL/L — SIGNIFICANT CHANGE UP (ref 17–32)
CREAT SERPL-MCNC: 0.4 MG/DL — LOW (ref 0.7–1.5)
GLUCOSE SERPL-MCNC: 82 MG/DL — SIGNIFICANT CHANGE UP (ref 70–110)
HCT VFR BLD CALC: 37.5 % — SIGNIFICANT CHANGE UP (ref 37–47)
HGB BLD-MCNC: 13 G/DL — LOW (ref 14–18)
MCHC RBC-ENTMCNC: 31.1 PG — HIGH (ref 27–31)
MCHC RBC-ENTMCNC: 34.7 G/DL — SIGNIFICANT CHANGE UP (ref 32–37)
MCV RBC AUTO: 89.7 FL — SIGNIFICANT CHANGE UP (ref 81–91)
NRBC # BLD: 0 /100 WBCS — SIGNIFICANT CHANGE UP (ref 0–0)
PLATELET # BLD AUTO: 263 K/UL — SIGNIFICANT CHANGE UP (ref 130–400)
POTASSIUM SERPL-MCNC: 4.8 MMOL/L — SIGNIFICANT CHANGE UP (ref 3.5–5)
POTASSIUM SERPL-SCNC: 4.8 MMOL/L — SIGNIFICANT CHANGE UP (ref 3.5–5)
RBC # BLD: 4.18 M/UL — LOW (ref 4.2–5.4)
RBC # FLD: 12.9 % — SIGNIFICANT CHANGE UP (ref 11.5–14.5)
SODIUM SERPL-SCNC: 138 MMOL/L — SIGNIFICANT CHANGE UP (ref 135–146)
WBC # BLD: 5.49 K/UL — SIGNIFICANT CHANGE UP (ref 4.8–10.8)
WBC # FLD AUTO: 5.49 K/UL — SIGNIFICANT CHANGE UP (ref 4.8–10.8)

## 2018-02-24 RX ADMIN — CHLORHEXIDINE GLUCONATE 15 MILLILITER(S): 213 SOLUTION TOPICAL at 06:27

## 2018-02-24 RX ADMIN — HEPARIN SODIUM 5000 UNIT(S): 5000 INJECTION INTRAVENOUS; SUBCUTANEOUS at 06:27

## 2018-02-24 RX ADMIN — HEPARIN SODIUM 5000 UNIT(S): 5000 INJECTION INTRAVENOUS; SUBCUTANEOUS at 21:38

## 2018-02-24 RX ADMIN — CHLORHEXIDINE GLUCONATE 15 MILLILITER(S): 213 SOLUTION TOPICAL at 17:16

## 2018-02-24 RX ADMIN — HEPARIN SODIUM 5000 UNIT(S): 5000 INJECTION INTRAVENOUS; SUBCUTANEOUS at 14:39

## 2018-02-24 RX ADMIN — PANTOPRAZOLE SODIUM 40 MILLIGRAM(S): 20 TABLET, DELAYED RELEASE ORAL at 06:27

## 2018-02-24 NOTE — PROGRESS NOTE ADULT - SUBJECTIVE AND OBJECTIVE BOX
Patient seen and examined in presence of patent mother. Pt flailing L arm and L leg since yesterday    T(F): , Max: 99.1 (02-24-18 @ 07:55)  HR: 97 (02-24-18 @ 15:00) (97 - 121)  BP: 130/84 (02-24-18 @ 15:00)  RR: 18 (02-24-18 @ 15:00)  SpO2: 97% (02-24-18 @ 08:25)  IN: 1701 mL / OUT: 0 mL / NET: 1701 mL    IN: 680 mL / OUT: 0 mL / NET: 680 mL      General: No apparent distress, restless  Cardiovascular: S1, S2  Gastrointestinal: Soft, Non-tender, Non-distended, PEG in place  Respiratory: Good air entry bilaterally, on trach T piece  Musculoskeletal: Moves L extermities. R leg moves spontaneously. R arm flexed  Lymphatic: No edema  Neurologic: Opens eyes, doesn't follow commands  Dermatologic: Skin dry                          13.0   5.49  )-----------( 263      ( 24 Feb 2018 09:21 )             37.5     02-24    138  |  101  |  11  ----------------------------<  82  4.8   |  27  |  0.4<L>    Ca    10.5<H>      24 Feb 2018 09:21

## 2018-02-24 NOTE — PROGRESS NOTE ADULT - ASSESSMENT
Encephalopathy - 2/2 suspected drug induced     Resp failure - s/p trach, pulmonary for further mgmt    Cystitis - resolved, no fevers    R arm flexion - suspect palsy, peripheral vs central, neuro f/u     Hypercalcemia - recheck, etiology unclear, suspect 2/2 nerve injury vs immobilization

## 2018-02-25 LAB
ANION GAP SERPL CALC-SCNC: 9 MMOL/L — SIGNIFICANT CHANGE UP (ref 7–14)
BUN SERPL-MCNC: 13 MG/DL — SIGNIFICANT CHANGE UP (ref 10–20)
CALCIUM SERPL-MCNC: 10.2 MG/DL — HIGH (ref 8.5–10.1)
CHLORIDE SERPL-SCNC: 100 MMOL/L — SIGNIFICANT CHANGE UP (ref 98–110)
CO2 SERPL-SCNC: 27 MMOL/L — SIGNIFICANT CHANGE UP (ref 17–32)
CREAT SERPL-MCNC: 0.4 MG/DL — LOW (ref 0.7–1.5)
GLUCOSE SERPL-MCNC: 94 MG/DL — SIGNIFICANT CHANGE UP (ref 70–110)
HCT VFR BLD CALC: 38 % — SIGNIFICANT CHANGE UP (ref 37–47)
HGB BLD-MCNC: 13.2 G/DL — LOW (ref 14–18)
MCHC RBC-ENTMCNC: 31.2 PG — HIGH (ref 27–31)
MCHC RBC-ENTMCNC: 34.7 G/DL — SIGNIFICANT CHANGE UP (ref 32–37)
MCV RBC AUTO: 89.8 FL — SIGNIFICANT CHANGE UP (ref 81–91)
NRBC # BLD: 0 /100 WBCS — SIGNIFICANT CHANGE UP (ref 0–0)
PLATELET # BLD AUTO: 236 K/UL — SIGNIFICANT CHANGE UP (ref 130–400)
POTASSIUM SERPL-MCNC: 4.4 MMOL/L — SIGNIFICANT CHANGE UP (ref 3.5–5)
POTASSIUM SERPL-SCNC: 4.4 MMOL/L — SIGNIFICANT CHANGE UP (ref 3.5–5)
RBC # BLD: 4.23 M/UL — SIGNIFICANT CHANGE UP (ref 4.2–5.4)
RBC # FLD: 12.9 % — SIGNIFICANT CHANGE UP (ref 11.5–14.5)
SODIUM SERPL-SCNC: 136 MMOL/L — SIGNIFICANT CHANGE UP (ref 135–146)
WBC # BLD: 7.03 K/UL — SIGNIFICANT CHANGE UP (ref 4.8–10.8)
WBC # FLD AUTO: 7.03 K/UL — SIGNIFICANT CHANGE UP (ref 4.8–10.8)

## 2018-02-25 RX ADMIN — CHLORHEXIDINE GLUCONATE 15 MILLILITER(S): 213 SOLUTION TOPICAL at 05:48

## 2018-02-25 RX ADMIN — PANTOPRAZOLE SODIUM 40 MILLIGRAM(S): 20 TABLET, DELAYED RELEASE ORAL at 06:17

## 2018-02-25 RX ADMIN — HEPARIN SODIUM 5000 UNIT(S): 5000 INJECTION INTRAVENOUS; SUBCUTANEOUS at 21:36

## 2018-02-25 RX ADMIN — HEPARIN SODIUM 5000 UNIT(S): 5000 INJECTION INTRAVENOUS; SUBCUTANEOUS at 05:47

## 2018-02-25 RX ADMIN — HEPARIN SODIUM 5000 UNIT(S): 5000 INJECTION INTRAVENOUS; SUBCUTANEOUS at 15:01

## 2018-02-25 RX ADMIN — CHLORHEXIDINE GLUCONATE 15 MILLILITER(S): 213 SOLUTION TOPICAL at 19:03

## 2018-02-25 NOTE — PROGRESS NOTE ADULT - ASSESSMENT
Encephalopathy - 2/2 suspected drug induced, unclear if mental status improving any further    Resp failure - s/p trach, pulmonary for further mgmt    Fever - recurrent - S/P Merrem for cystitis. ID F/U. unclear if infectious or central at this point. Just seen by ID yesterday who recommended to stop Merrem!    R arm flexion - suspect palsy, peripheral vs central, neuro f/u     Hypercalcemia - recheck, improved

## 2018-02-25 NOTE — PROGRESS NOTE ADULT - SUBJECTIVE AND OBJECTIVE BOX
T(F): , Max: 104 (02-24-18 @ 23:39)  HR: 108 (02-25-18 @ 16:15) (102 - 108)  BP: 140/72 (02-25-18 @ 07:54)  RR: 18 (02-24-18 @ 23:39)  SpO2: 100% (02-25-18 @ 16:15)  IN: 1360 mL / OUT: 0 mL / NET: 1360 mL    IN: 0 mL / OUT: 2 mL / NET: -2 mL      General: No apparent distress, combative  Cardiovascular: S1, S2  Gastrointestinal: Soft, Non-tender, Non-distended  Respiratory: Good air entry bilaterally  Musculoskeletal: Moves all extremities, preferentially L side. R arm still flexed and spastic  Lymphatic: No edema  Neurologic: Does not follow commands  Dermatologic: Skin dry                          13.2   7.03  )-----------( 236      ( 25 Feb 2018 11:25 )             38.0     02-25    136  |  100  |  13  ----------------------------<  94  4.4   |  27  |  0.4<L>    Ca    10.2<H>      25 Feb 2018 11:25

## 2018-02-26 LAB
ANION GAP SERPL CALC-SCNC: 8 MMOL/L — SIGNIFICANT CHANGE UP (ref 7–14)
BUN SERPL-MCNC: 11 MG/DL — SIGNIFICANT CHANGE UP (ref 10–20)
CALCIUM SERPL-MCNC: 9.8 MG/DL — SIGNIFICANT CHANGE UP (ref 8.5–10.1)
CHLORIDE SERPL-SCNC: 97 MMOL/L — LOW (ref 98–110)
CO2 SERPL-SCNC: 26 MMOL/L — SIGNIFICANT CHANGE UP (ref 17–32)
CREAT SERPL-MCNC: 0.4 MG/DL — LOW (ref 0.7–1.5)
GLUCOSE SERPL-MCNC: 99 MG/DL — SIGNIFICANT CHANGE UP (ref 70–110)
HCT VFR BLD CALC: 35.7 % — LOW (ref 37–47)
HGB BLD-MCNC: 12.4 G/DL — LOW (ref 14–18)
MCHC RBC-ENTMCNC: 31.3 PG — HIGH (ref 27–31)
MCHC RBC-ENTMCNC: 34.7 G/DL — SIGNIFICANT CHANGE UP (ref 32–37)
MCV RBC AUTO: 90.2 FL — SIGNIFICANT CHANGE UP (ref 81–91)
NRBC # BLD: 0 /100 WBCS — SIGNIFICANT CHANGE UP (ref 0–0)
PLATELET # BLD AUTO: 220 K/UL — SIGNIFICANT CHANGE UP (ref 130–400)
POTASSIUM SERPL-MCNC: 4.1 MMOL/L — SIGNIFICANT CHANGE UP (ref 3.5–5)
POTASSIUM SERPL-SCNC: 4.1 MMOL/L — SIGNIFICANT CHANGE UP (ref 3.5–5)
RBC # BLD: 3.96 M/UL — LOW (ref 4.2–5.4)
RBC # FLD: 12.7 % — SIGNIFICANT CHANGE UP (ref 11.5–14.5)
SODIUM SERPL-SCNC: 131 MMOL/L — LOW (ref 135–146)
WBC # BLD: 6.55 K/UL — SIGNIFICANT CHANGE UP (ref 4.8–10.8)
WBC # FLD AUTO: 6.55 K/UL — SIGNIFICANT CHANGE UP (ref 4.8–10.8)

## 2018-02-26 RX ADMIN — CHLORHEXIDINE GLUCONATE 15 MILLILITER(S): 213 SOLUTION TOPICAL at 06:06

## 2018-02-26 RX ADMIN — CHLORHEXIDINE GLUCONATE 15 MILLILITER(S): 213 SOLUTION TOPICAL at 18:30

## 2018-02-26 RX ADMIN — HEPARIN SODIUM 5000 UNIT(S): 5000 INJECTION INTRAVENOUS; SUBCUTANEOUS at 14:30

## 2018-02-26 RX ADMIN — HEPARIN SODIUM 5000 UNIT(S): 5000 INJECTION INTRAVENOUS; SUBCUTANEOUS at 06:04

## 2018-02-26 RX ADMIN — HEPARIN SODIUM 5000 UNIT(S): 5000 INJECTION INTRAVENOUS; SUBCUTANEOUS at 21:35

## 2018-02-26 RX ADMIN — PANTOPRAZOLE SODIUM 40 MILLIGRAM(S): 20 TABLET, DELAYED RELEASE ORAL at 06:06

## 2018-02-26 NOTE — PROGRESS NOTE ADULT - SUBJECTIVE AND OBJECTIVE BOX
SUBJECTIVE:    Patient is a 27y old Female who presents with a chief complaint of altered mental status.   Currently admitted to medicine with the primary diagnosis of AMS secondary to substance abuse/overdose.    This morning she is resting comfortably in bed and reports no new issues or overnight events.     PAST MEDICAL & SURGICAL HISTORY - none     SOCIAL HISTORY:  Negative for smoking/alcohol/drug use, unknown     ALLERGIES:  No Known Allergies    MEDICATIONS:  STANDING MEDICATIONS  chlorhexidine 0.12% Liquid 15 milliLiter(s) Swish and Spit two times a day  heparin  Injectable 5000 Unit(s) SubCutaneous every 8 hours  meropenem  IVPB      meropenem  IVPB 500 milliGRAM(s) IV Intermittent every 8 hours  pantoprazole   Suspension 40 milliGRAM(s) Oral before breakfast  senna 2 Tablet(s) Oral at bedtime    PRN MEDICATIONS  acetaminophen    Suspension 650 milliGRAM(s) Oral every 4 hours PRN  lactulose Syrup 15 Gram(s) Oral two times a day PRN    VITALS:   Vital Signs Last 24 Hrs  T(C): 36.8 (26 Feb 2018 07:36), Max: 37.1 (25 Feb 2018 15:00)  T(F): 98.3 (26 Feb 2018 07:36), Max: 98.7 (25 Feb 2018 15:00)  HR: 116 (26 Feb 2018 08:25) (95 - 116)  BP: 147/95 (26 Feb 2018 07:36) (114/75 - 147/95)  BP(mean): 116 (26 Feb 2018 07:36) (116 - 116)  RR: 20 (26 Feb 2018 07:36) (18 - 20)  SpO2: 100% (26 Feb 2018 08:25) (100% - 100%)    LABS:                          12.4   6.55  )-----------( 220      ( 26 Feb 2018 06:59 )             35.7         PHYSICAL EXAM:  GEN: No acute distress, opens eyes, more alert   LUNGS: Clear to auscultation bilaterally , TRACH IN PLACE  HEART: S1/S2 present. RRR.   ABD: Soft, non-tender, non-distended. Bowel sounds present  EXT: Right UE contractures present.   NEURO: Awake but not following commands.

## 2018-02-26 NOTE — PROGRESS NOTE ADULT - SUBJECTIVE AND OBJECTIVE BOX
SUBJECTIVE:    Patient is a 27y old Female who presents with a chief complaint of   Currently admitted to medicine with the primary diagnosis of    Today is hospital day 32d. This morning she is resting comfortably in bed and reports no new issues or overnight events.     PAST MEDICAL & SURGICAL HISTORY    SOCIAL HISTORY:  Negative for smoking/alcohol/drug use.     ALLERGIES:  No Known Allergies    MEDICATIONS:  STANDING MEDICATIONS  chlorhexidine 0.12% Liquid 15 milliLiter(s) Swish and Spit two times a day  heparin  Injectable 5000 Unit(s) SubCutaneous every 8 hours  pantoprazole   Suspension 40 milliGRAM(s) Oral before breakfast    PRN MEDICATIONS  acetaminophen    Suspension 650 milliGRAM(s) Oral every 4 hours PRN  lactulose Syrup 15 Gram(s) Oral two times a day PRN    VITALS:   T(F): 98.3  HR: 116  BP: 147/95  RR: 20  SpO2: 100%    LABS:                        12.4   6.55  )-----------( 220      ( 26 Feb 2018 06:59 )             35.7     02-26    131<L>  |  97<L>  |  11  ----------------------------<  99  4.1   |  26  |  0.4<L>    Ca    9.8      26 Feb 2018 06:59                    RADIOLOGY:    PHYSICAL EXAM:  GEN: No acute distress, opens eyes, more alert   LUNGS: Clear to auscultation bilaterally , TRACH IN PLACE  HEART: S1/S2 present. RRR.   ABD: Soft, non-tender, non-distended. Bowel sounds present  EXT: Right UE contractures present.   NEURO: Awake but not following commands.

## 2018-02-26 NOTE — PROGRESS NOTE ADULT - ASSESSMENT
Assessment and Plan:   Patient is a 27y old  Female who presents with a chief complaint of mental status change. She is been treated for encephalopathy likely secondary to Substance  abuse and overdose. Patient is S/P extubation and currently status trach and status post peg. having persistent fever and treated for UTI also.    #)Acute Encephalopathy likely secondary to drug overdose. mother concerns patient may have taken something while she was working at night shifts.    - Patient trached at this point.    - continue to monitor for responsiveness        #)ARF secondary to drug overdose and respiratory depression   - s/p Trach   - Continue Trachea care as per unit routine    -  Pulmonary follows as needed    #)Fevers secondary to cystitis   Completed antibiotic course, D/joanne antibiotics as per ID    DVT ppx - sq heparin  GI ppx    D/W mother.   D/C planning ONCE FINANCIAL CLEARANCE IS MADE WITH FAMILY REGARDING PATIENTS PROPERTY .  has been working on this.

## 2018-02-27 ENCOUNTER — TRANSCRIPTION ENCOUNTER (OUTPATIENT)
Age: 28
End: 2018-02-27

## 2018-02-27 LAB
ANION GAP SERPL CALC-SCNC: 11 MMOL/L — SIGNIFICANT CHANGE UP (ref 7–14)
BUN SERPL-MCNC: 12 MG/DL — SIGNIFICANT CHANGE UP (ref 10–20)
CALCIUM SERPL-MCNC: 10 MG/DL — SIGNIFICANT CHANGE UP (ref 8.5–10.1)
CHLORIDE SERPL-SCNC: 102 MMOL/L — SIGNIFICANT CHANGE UP (ref 98–110)
CO2 SERPL-SCNC: 23 MMOL/L — SIGNIFICANT CHANGE UP (ref 17–32)
CREAT SERPL-MCNC: 0.4 MG/DL — LOW (ref 0.7–1.5)
GLUCOSE SERPL-MCNC: 92 MG/DL — SIGNIFICANT CHANGE UP (ref 70–110)
HCT VFR BLD CALC: 37.9 % — SIGNIFICANT CHANGE UP (ref 37–47)
HGB BLD-MCNC: 13.2 G/DL — LOW (ref 14–18)
MCHC RBC-ENTMCNC: 31.8 PG — HIGH (ref 27–31)
MCHC RBC-ENTMCNC: 34.8 G/DL — SIGNIFICANT CHANGE UP (ref 32–37)
MCV RBC AUTO: 91.3 FL — HIGH (ref 81–91)
NRBC # BLD: 0 /100 WBCS — SIGNIFICANT CHANGE UP (ref 0–0)
PLATELET # BLD AUTO: 203 K/UL — SIGNIFICANT CHANGE UP (ref 130–400)
POTASSIUM SERPL-MCNC: 5.1 MMOL/L — HIGH (ref 3.5–5)
POTASSIUM SERPL-SCNC: 5.1 MMOL/L — HIGH (ref 3.5–5)
RBC # BLD: 4.15 M/UL — LOW (ref 4.2–5.4)
RBC # FLD: 13 % — SIGNIFICANT CHANGE UP (ref 11.5–14.5)
SODIUM SERPL-SCNC: 136 MMOL/L — SIGNIFICANT CHANGE UP (ref 135–146)
WBC # BLD: 13.45 K/UL — HIGH (ref 4.8–10.8)
WBC # FLD AUTO: 13.45 K/UL — HIGH (ref 4.8–10.8)

## 2018-02-27 RX ADMIN — CHLORHEXIDINE GLUCONATE 15 MILLILITER(S): 213 SOLUTION TOPICAL at 17:22

## 2018-02-27 RX ADMIN — CHLORHEXIDINE GLUCONATE 15 MILLILITER(S): 213 SOLUTION TOPICAL at 05:23

## 2018-02-27 RX ADMIN — HEPARIN SODIUM 5000 UNIT(S): 5000 INJECTION INTRAVENOUS; SUBCUTANEOUS at 21:09

## 2018-02-27 RX ADMIN — HEPARIN SODIUM 5000 UNIT(S): 5000 INJECTION INTRAVENOUS; SUBCUTANEOUS at 13:21

## 2018-02-27 RX ADMIN — HEPARIN SODIUM 5000 UNIT(S): 5000 INJECTION INTRAVENOUS; SUBCUTANEOUS at 05:23

## 2018-02-27 RX ADMIN — PANTOPRAZOLE SODIUM 40 MILLIGRAM(S): 20 TABLET, DELAYED RELEASE ORAL at 06:02

## 2018-02-27 NOTE — DISCHARGE NOTE ADULT - MEDICATION SUMMARY - MEDICATIONS TO TAKE
I will START or STAY ON the medications listed below when I get home from the hospital:    heparin  -- 5000 unit(s) subcutaneous every 8 hours  -- Indication: For DVT Prophylaxis

## 2018-02-27 NOTE — SWALLOW BEDSIDE ASSESSMENT ADULT - SWALLOW EVAL: RECOMMENDED DIET
Monitor tracheal secretions for blue dye, RN to suction in 2 hrs, document findings. If no blue dye consider initiating pleasure feeds of puree foods only

## 2018-02-27 NOTE — DISCHARGE NOTE ADULT - HOSPITAL COURSE
Patient is a 27y old  Female who presents with a chief complaint of mental status change. She is been treated for encephalopathy likely secondary to Substance  abuse and overdose. 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 as per the  mother. She was managed in the Unit for Acute respiratiry failure failed weaning trail s/p trach and PEG on 2/5/2018 hospital course complicated by UTI cultures positive for E.coli treated with meropenem completed antibiotic course. 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 She was being treated for encephalopathy secondary to Substance  abuse and overdose. She was managed in the Unit for Acute respiratory failure, she originally failed a weaning trail and had a tracheostomy and PEG tube put in on 2/5/2018. Her hospital course was also complicated by a UTI with cultures positive for E.coli and was treated with meropenem and had completed her antibiotic course per infectious disease. She had removed her PEG tube on March 2nd and was tolerating a regular diet per speech and swallow. Her trach tube was also removed by Dr. Shirley on March 8th. She was seen by Dr. Macario, a neuropsychologist who recommended for physical therapy, occupational therapy to work with her and have her transferred to the rehab unit. 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 She was  treated for encephalopathy secondary to substance  abuse and overdose.     She was followed in the Intensive Care Unit for acute respiratory failure.  She failed multiple weaning trials and required tracheostomy and PEG placed on 2/5/2018. Her hospital course was also complicated by a UTI with cultures positive for E.coli and was treated with meropenem and had completed her antibiotic course per infectious disease. She removed her own PEG tube on March 2nd and tolerated a regular diet per speech and swallow. Her trach tube was also removed by Dr. Shirley on March 8th. She was seen by Dr. Macario, a neuropsychologist who recommended for physical and occupational therapy and transfer to acute rehab.

## 2018-02-27 NOTE — DISCHARGE NOTE ADULT - PATIENT PORTAL LINK FT
You can access the [facility] Patient Portal, offered by [facility], by registering with the following website: [email or web address]

## 2018-02-27 NOTE — PROGRESS NOTE ADULT - SUBJECTIVE AND OBJECTIVE BOX
infectious diseases progress note:  MICHELLE DRISCOLL is a 27yFemale patient    OVERDOSE    Acute cystitis without hematuria  Encephalopathy  Drug overdose, undetermined intent, initial encounter  Acute respiratory failure, unspecified whether with hypoxia or hypercapnia      ROS:  CONSTITUTIONAL:  Negative fever or chills, feels well, good appetite  EYES:  Negative  blurry vision or double vision  CARDIOVASCULAR:  Negative for chest pain or palpitations  RESPIRATORY:  Negative for cough, wheezing, or SOB   GASTROINTESTINAL:  Negative for nausea, vomiting, diarrhea, constipation, or abdominal pain  GENITOURINARY:  Negative frequency, urgency or dysuria  NEUROLOGIC:  No headache, confusion, dizziness, lightheadedness    Allergies    No Known Allergies    Intolerances        ANTIBIOTICS/RELEVANT:  antimicrobials    immunologic:    OTHER:  acetaminophen    Suspension 650 milliGRAM(s) Oral every 4 hours PRN  chlorhexidine 0.12% Liquid 15 milliLiter(s) Swish and Spit two times a day  heparin  Injectable 5000 Unit(s) SubCutaneous every 8 hours  lactulose Syrup 15 Gram(s) Oral two times a day PRN  pantoprazole   Suspension 40 milliGRAM(s) Oral before breakfast      Objective:  T(F): 98.9 (02-27-18 @ 07:40), Max: 99.1 (02-26-18 @ 23:37)  HR: 108 (02-27-18 @ 07:40) (101 - 110)  BP: 121/84 (02-27-18 @ 07:40) (117/73 - 121/84)  RR: 18 (02-27-18 @ 07:40) (18 - 20)  SpO2: 100% (02-26-18 @ 16:40) (100% - 100%)    PHYSICAL EXAM  Constitutional:Well-developed, well nourished  Eyes:SHELLIE, EOMI  Ear/Nose/Throat: no oral lesion, no sinus tenderness on percussion	  Neck:no JVD, no lymphadenopathy, supple  Respiratory: CTA dinesh  Cardiovascular: S1S2 RRR, no murmurs  Gastrointestinal:soft, (+) BS, no HSM  Extremities:no e/e/c    02-26    131<L>  |  97<L>  |  11  ----------------------------<  99  4.1   |  26  |  0.4<L>                                13.2   13.45 )-----------( 203      ( 27 Feb 2018 08:23 )             37.9

## 2018-02-27 NOTE — PROGRESS NOTE ADULT - SUBJECTIVE AND OBJECTIVE BOX
MICHELLE DRISCOLL  27y, Female  Allergy: No Known Allergies      LAST 24-Hr EVENTS:    patient seen and examined independently bedside on rounds- mother bedside- endorses increasing cough and yellowish color phlegm from trach suction- no fever overnight    PAST MEDICAL & SURGICAL HISTORY:      VITALS:  T(F): 98.1 (02-27-18 @ 15:42), Max: 99.1 (02-26-18 @ 23:37)  HR: 92 (02-27-18 @ 15:42)  BP: 120/78 (02-27-18 @ 15:42) (117/73 - 121/84)  RR: 18 (02-27-18 @ 15:42)  SpO2: 98% (02-27-18 @ 16:27)    PE:  GEN-awake, eyes open, unable to follow commands or communicate verbally  CHEST- +trach, decreased air entry bilaterally  CVS- +s1/s2 +tachycardic  ABD- soft NT ND +bs, +PEG site c/d/i  EXT-+contracted upper extremities  TESTS & MEASUREMENTS:  Weight (Kg):     02-25-18 @ 07:01  -  02-26-18 @ 07:00  --------------------------------------------------------  IN: 340 mL / OUT: 2 mL / NET: 338 mL    02-26-18 @ 07:01  -  02-27-18 @ 07:00  --------------------------------------------------------  IN: 680 mL / OUT: 2 mL / NET: 678 mL                            13.2   13.45 )-----------( 203      ( 27 Feb 2018 08:23 )             37.9       02-27    136  |  102  |  12  ----------------------------<  92  5.1<H>   |  23  |  0.4<L>    Ca    10.0      27 Feb 2018 08:23                  RADIOLOGY & ADDITIONAL TESTS:    RECENT DIAGNOSTIC ORDERS:  Culture - Sputum: Routine  Specimen Source: Sputum  Addl Info: Tracheal aspirate (02-27-18 @ 14:07)  Complete Blood Count: AM Sched. Collection: 28-Feb-2018 04:30 (02-27-18 @ 15:54)  Xray Chest 1 View- PORTABLE-Routine: Routine   Indication: Pneumonia  Transport: Portable  Provider's Contact #: [phone] (02-27-18 @ 17:15)  Basic Metabolic Panel: AM Sched. Collection: 28-Feb-2018 04:30 (02-27-18 @ 17:16)      MEDICATIONS:  MEDICATIONS  (STANDING):  chlorhexidine 0.12% Liquid 15 milliLiter(s) Swish and Spit two times a day  heparin  Injectable 5000 Unit(s) SubCutaneous every 8 hours  pantoprazole   Suspension 40 milliGRAM(s) Oral before breakfast    MEDICATIONS  (PRN):  acetaminophen    Suspension 650 milliGRAM(s) Oral every 4 hours PRN For Temp greater than 38 C (100.4 F)  lactulose Syrup 15 Gram(s) Oral two times a day PRN constipation      HEALTH ISSUES - PROBLEM Dx:  Acute cystitis without hematuria: Acute cystitis without hematuria  Encephalopathy: Encephalopathy  Drug overdose, undetermined intent, initial encounter: Drug overdose, undetermined intent, initial encounter  Acute respiratory failure, unspecified whether with hypoxia or hypercapnia: Acute respiratory failure, unspecified whether with hypoxia or hypercapnia

## 2018-02-27 NOTE — PROGRESS NOTE ADULT - SUBJECTIVE AND OBJECTIVE BOX
SUBJECTIVE:    Patient is a 27y old Female who presents with a chief complaint of   Currently admitted to medicine with the primary diagnosis of    Today is hospital day 33d. This morning she is resting comfortably in bed and reports no new issues or overnight events.     PAST MEDICAL & SURGICAL HISTORY    SOCIAL HISTORY:  Negative for smoking/alcohol/drug use.     ALLERGIES:  No Known Allergies    MEDICATIONS:  STANDING MEDICATIONS  chlorhexidine 0.12% Liquid 15 milliLiter(s) Swish and Spit two times a day  heparin  Injectable 5000 Unit(s) SubCutaneous every 8 hours  pantoprazole   Suspension 40 milliGRAM(s) Oral before breakfast    PRN MEDICATIONS  acetaminophen    Suspension 650 milliGRAM(s) Oral every 4 hours PRN  lactulose Syrup 15 Gram(s) Oral two times a day PRN    VITALS:   T(F): 98.9  HR: 101  BP: 121/84  RR: 18  SpO2: 98%    LABS:                        13.2   13.45 )-----------( 203      ( 27 Feb 2018 08:23 )             37.9     02-27    136  |  102  |  12  ----------------------------<  92  5.1<H>   |  23  |  0.4<L>    Ca    10.0      27 Feb 2018 08:23                    RADIOLOGY:    PHYSICAL EXAM:  GEN: No acute distress, opens eyes, more alert   LUNGS: Clear to auscultation bilaterally , TRACH IN PLACE  HEART: S1/S2 present. RRR.   ABD: Soft, non-tender, non-distended. Bowel sounds present  EXT: Right UE contractures present.   NEURO: Awake but not following commands.

## 2018-02-27 NOTE — SWALLOW BEDSIDE ASSESSMENT ADULT - SLP GENERAL OBSERVATIONS
Pt received in bed w/ mother at b/s. +awake,  restless, +O2 t-piece. +trialed w/ speaking valve, +toleration for <1 min, +became increasingly restless. +SPO2 remained stable., valce removed prior to po trials 2' restlessness

## 2018-02-27 NOTE — CHART NOTE - NSCHARTNOTEFT_GEN_A_CORE
Registered Dietitian Follow-Up - limited note (TD 10a)     Patient Profile Reviewed                           Yes [v]   No []     Pt completed course on IV abx for cystitis, per ID plan is to observe off abx. WBC/labs/meds noted. Pt is trached, off vent. Seems obtunded.   Regular BMs (last one today). Skin intact.      Diet order: Jevity 1.2- 240ml q 4 hrs via PEG (provides: 1710kcal, 79gm protein, 1152ml h20). Per MICHAEL Zarate pt is tolerating feeds well.       Estimated needs:   kcal: 1690-1830kcal/d (MSJ 1.2-1.3, used today's weight)  protein: 68-88gm/d (1-1.3gm/kg act wt)    fluids: 1ml/1kcal or as per LIP/TD team    Anthropometrics:  - Ht. 65"  - Wt. 68kg (2/27) vs. 68.9kg (2/25) vs. weight pta - 70kg (as per pt's mother) - will continue to monitor.   - BMI 24.9 ( used today's weight)  - IBW 125lbs (+/- 10%)    Current TF order provides:  % of estimated kcal needs and % estimated protein needs. Additional fluids as per LIP.    No nutrition dx at this time. Please continue current TF regimen.     Will continue to monitor energy intake, labs, body composition, nutrition focused physical findings.     Not at risk f/u.

## 2018-02-27 NOTE — PROGRESS NOTE ADULT - ASSESSMENT
Assessment and Plan:   Patient is a 27y old  Female who presents with a chief complaint of mental status change. She is been treated for encephalopathy likely secondary to Substance  abuse and overdose. Patient is S/P extubation and currently status trach and status post peg. having persistent fever and treated for UTI also.    #)Acute Encephalopathy likely secondary to drug overdose. mother concerns patient may have taken something while she was working at night shifts.    - Patient trached at this point.    - continue to monitor for responsiveness        #)ARF secondary to drug overdose and respiratory depression   - s/p Trach   - Continue Trachea care as per unit routine    -  Pulmonary follows as needed    #)Acute cystitis Resolved   Completed antibiotic course, D/joanne antibiotics as per ID  WBC count increased from 6.55 to 13.45, No fever will monitor    DVT ppx - sq heparin  GI ppx    D/W mother.   D/C planning pending Assessment and Plan:   Patient is a 27y old  Female who presents with a chief complaint of mental status change. She is been treated for encephalopathy likely secondary to Substance  abuse and overdose. Patient is S/P extubation and currently status trach and status post peg. having persistent fever and treated for UTI also.    #)Acute Encephalopathy likely secondary to drug overdose. mother concerns patient may have taken something while she was working at night shifts.    - Patient trached at this point.    - continue to monitor for responsiveness        #)ARF secondary to drug overdose and respiratory depression   - s/p Trach   - Continue Trachea care as per unit routine    -  Pulmonary follows as needed    #)Acute cystitis Resolved   Completed antibiotic course, D/joanne antibiotics as per ID  WBC count increased from 6.55 to 13.45, No fever will monitor.  consider UA, Urine culture, Chest xray if she has fever.    DVT ppx - sq heparin  GI ppx    Spoke with mother at bedside  D/C planning pending

## 2018-02-27 NOTE — SPEECH LANGUAGE PATHOLOGY EVALUATION - SLP PERTINENT HISTORY OF CURRENT PROBLEM
Pt admitted s/p overdose. +Respiratory failure, dysphagia, encephalopathy, +anoxic brain injury, trach, PEG, tolerating t-piece.

## 2018-02-27 NOTE — PROGRESS NOTE ADULT - ASSESSMENT
Assessment and Plan:   Patient is a 27y old  Female who presents with a chief complaint of mental status change. She is been treated for encephalopathy likely secondary to Substance  abuse and overdose. Patient is S/P extubation and currently s/p trach and peg. awaiitng dispo to TBI facility for placement    1. Acute Encephalopathy likely secondary to drug overdose. mother concerns patient may have taken something while she was working at night shifts.    - Patient trached at this point.    - continue to monitor for responsiveness   - no change clinically       2. ARF secondary to drug overdose and respiratory depression   - s/p Trach   - Continue Trachea care as per unit routine    -  Pulmonary follows as needed    3. Fevers secondary to cystitis   - Urine culture positive for E.coli   - finished course of merrem yesterday 2/26  - now with significant leukocytosis (wbc doubled from 6--->13)- afebrile today  - check repeat cxr and  trach aspirate cx  -  monitor wbc and fever curve closely- low threshold to restart abx if concern for new infection-- ID following    DVT ppx - sq heparin  GI ppx    D/W mother.   D/C planning ONCE FINANCIAL CLEARANCE IS MADE WITH FAMILY REGARDING PATIENTS PROPERTY .   involved    FULL CODE- guarded prognosis

## 2018-02-27 NOTE — DISCHARGE NOTE ADULT - CARE PROVIDER_API CALL
Michelle Macario (PhD), [facility] Staff  [address] [place] [address]  Phone: [phone]  Fax: [phone]

## 2018-02-27 NOTE — DISCHARGE NOTE ADULT - CARE PLAN
Principal Discharge DX:	Acute respiratory failure, unspecified whether with hypoxia or hypercapnia  Secondary Diagnosis:	Encephalopathy  Secondary Diagnosis:	Acute cystitis without hematuria Principal Discharge DX:	Encephalopathy  Goal:	Rehab  Assessment and plan of treatment:	Participate in Therapy sessions every day as prescribed.

## 2018-02-28 PROBLEM — Z00.00 ENCOUNTER FOR PREVENTIVE HEALTH EXAMINATION: Status: ACTIVE | Noted: 2018-02-28

## 2018-02-28 LAB
ANION GAP SERPL CALC-SCNC: 6 MMOL/L — LOW (ref 7–14)
BUN SERPL-MCNC: 9 MG/DL — LOW (ref 10–20)
CALCIUM SERPL-MCNC: 10.3 MG/DL — HIGH (ref 8.5–10.1)
CHLORIDE SERPL-SCNC: 101 MMOL/L — SIGNIFICANT CHANGE UP (ref 98–110)
CO2 SERPL-SCNC: 29 MMOL/L — SIGNIFICANT CHANGE UP (ref 17–32)
CREAT SERPL-MCNC: 0.4 MG/DL — LOW (ref 0.7–1.5)
GLUCOSE SERPL-MCNC: 106 MG/DL — SIGNIFICANT CHANGE UP (ref 70–110)
GRAM STN FLD: SIGNIFICANT CHANGE UP
HCT VFR BLD CALC: 35.8 % — LOW (ref 37–47)
HGB BLD-MCNC: 12.5 G/DL — LOW (ref 14–18)
MCHC RBC-ENTMCNC: 31.8 PG — HIGH (ref 27–31)
MCHC RBC-ENTMCNC: 34.9 G/DL — SIGNIFICANT CHANGE UP (ref 32–37)
MCV RBC AUTO: 91.1 FL — HIGH (ref 81–91)
NRBC # BLD: 0 /100 WBCS — SIGNIFICANT CHANGE UP (ref 0–0)
PLATELET # BLD AUTO: 200 K/UL — SIGNIFICANT CHANGE UP (ref 130–400)
POTASSIUM SERPL-MCNC: 5.1 MMOL/L — HIGH (ref 3.5–5)
POTASSIUM SERPL-SCNC: 5.1 MMOL/L — HIGH (ref 3.5–5)
RBC # BLD: 3.93 M/UL — LOW (ref 4.2–5.4)
RBC # FLD: 13.1 % — SIGNIFICANT CHANGE UP (ref 11.5–14.5)
SODIUM SERPL-SCNC: 136 MMOL/L — SIGNIFICANT CHANGE UP (ref 135–146)
SPECIMEN SOURCE: SIGNIFICANT CHANGE UP
WBC # BLD: 6.12 K/UL — SIGNIFICANT CHANGE UP (ref 4.8–10.8)
WBC # FLD AUTO: 6.12 K/UL — SIGNIFICANT CHANGE UP (ref 4.8–10.8)

## 2018-02-28 RX ADMIN — PANTOPRAZOLE SODIUM 40 MILLIGRAM(S): 20 TABLET, DELAYED RELEASE ORAL at 06:21

## 2018-02-28 RX ADMIN — HEPARIN SODIUM 5000 UNIT(S): 5000 INJECTION INTRAVENOUS; SUBCUTANEOUS at 14:27

## 2018-02-28 RX ADMIN — CHLORHEXIDINE GLUCONATE 15 MILLILITER(S): 213 SOLUTION TOPICAL at 18:09

## 2018-02-28 RX ADMIN — CHLORHEXIDINE GLUCONATE 15 MILLILITER(S): 213 SOLUTION TOPICAL at 05:25

## 2018-02-28 RX ADMIN — HEPARIN SODIUM 5000 UNIT(S): 5000 INJECTION INTRAVENOUS; SUBCUTANEOUS at 05:26

## 2018-02-28 RX ADMIN — HEPARIN SODIUM 5000 UNIT(S): 5000 INJECTION INTRAVENOUS; SUBCUTANEOUS at 21:17

## 2018-02-28 NOTE — PROGRESS NOTE ADULT - ASSESSMENT
Assessment and Plan:   Patient is a 27y old  Female who presents with a chief complaint of mental status change. She is been treated for encephalopathy likely secondary to Substance  abuse and overdose. Patient is S/P extubation and currently status trach and status post peg. having persistent fever and treated for UTI also.    #)Speech therapy recommended changing trach to uncuffed fenestrated size 6 for speech valve.  Now she had cuffed endotracheal tube of size 8    #)Acute Encephalopathy likely secondary to drug overdose. mother concerns patient may have taken something while she was working at night shifts.    - Patient trached at this point.    - continue to monitor for responsiveness        #)ARF secondary to drug overdose and respiratory depression   - s/p Trach   - Continue Trachea care as per unit routine    -  Pulmonary follows as needed    #)Acute cystitis Resolved   Completed antibiotic course, D/joanne antibiotics as per ID      DVT ppx - sq heparin  GI ppx    Spoke with mother at bedside  D/C planning pending Assessment and Plan:   Patient is a 27y old  Female who presents with a chief complaint of mental status change. She is been treated for encephalopathy likely secondary to Substance  abuse and overdose. Patient is S/P extubation and currently status trach and status post peg. having persistent fever and treated for UTI also.    #)Speech therapy recommended changing trach to uncuffed fenestrated size 6 for speech valve.  Now she had cuffed endotracheal tube of size 8, Spoke with mother at bedside she is ok to change trach.  s/p trach on 2/5 off on ventilator more than 2 weeks  Called general surgery    #)Acute Encephalopathy likely secondary to drug overdose. mother concerns patient may have taken something while she was working at night shifts.    - Patient trached at this point.    - continue to monitor for responsiveness        #)ARF secondary to drug overdose and respiratory depression   - s/p Trach   - Continue Trachea care as per unit routine    -  Pulmonary follows as needed    #)Acute cystitis Resolved   Completed antibiotic course, D/joanne antibiotics as per ID      DVT ppx - sq heparin  GI ppx    Spoke with mother at bedside  Dispo: after trach change D/C planning

## 2018-02-28 NOTE — PROGRESS NOTE ADULT - SUBJECTIVE AND OBJECTIVE BOX
SUBJECTIVE:    Patient is a 27y old Female who presents with a chief complaint of Opioid overdose (27 Feb 2018 15:44)    Currently admitted to medicine with the primary diagnosis of Acute respiratory failure, unspecified whether with hypoxia or hypercapnia     Today is hospital day 34d. This morning she is resting comfortably in bed and reports no new issues or overnight events.     PAST MEDICAL & SURGICAL HISTORY    SOCIAL HISTORY:  Negative for smoking/alcohol/drug use.     ALLERGIES:  No Known Allergies    MEDICATIONS:  STANDING MEDICATIONS  chlorhexidine 0.12% Liquid 15 milliLiter(s) Swish and Spit two times a day  heparin  Injectable 5000 Unit(s) SubCutaneous every 8 hours  pantoprazole   Suspension 40 milliGRAM(s) Oral before breakfast    PRN MEDICATIONS  acetaminophen    Suspension 650 milliGRAM(s) Oral every 4 hours PRN  lactulose Syrup 15 Gram(s) Oral two times a day PRN    VITALS:   T(F): 97  HR: 98  BP: 110/61  RR: 20  SpO2: 96%    LABS:                        12.5   6.12  )-----------( 200      ( 28 Feb 2018 06:11 )             35.8     02-28    136  |  101  |  9<L>  ----------------------------<  106  5.1<H>   |  29  |  0.4<L>    Ca    10.3<H>      28 Feb 2018 06:11                Culture - Sputum (collected 27 Feb 2018 14:08)  Source: .Sputum Sputum  Gram Stain (28 Feb 2018 05:22):    Few Squamous epithelial cells per low power field    Few polymorphonuclear leukocytes per low power field    Few Gram variable coccobacilli per oil power field          RADIOLOGY:    PHYSICAL EXAM:  GEN: No acute distress  LUNGS: Clear to auscultation bilaterally   HEART: S1/S2 present. RRR.   ABD: Soft, non-tender, non-distended. Bowel sounds present  EXT: NC/NC/NE/2+PP/ORDOÑEZ  NEURO: Awake not following commands. SUBJECTIVE:    Patient is a 27y old Female who presents with a chief complaint of Opioid overdose (27 Feb 2018 15:44)    Currently admitted to medicine with the primary diagnosis of Acute respiratory failure, unspecified whether with hypoxia or hypercapnia     Today is hospital day 34d. This morning she is resting comfortably in bed and reports no new issues or overnight events.   Spoke with mother at bedside as per mother she is improving, her emotional status is better, she is laughing.    PAST MEDICAL & SURGICAL HISTORY    SOCIAL HISTORY:  Negative for smoking/alcohol/drug use.     ALLERGIES:  No Known Allergies    MEDICATIONS:  STANDING MEDICATIONS  chlorhexidine 0.12% Liquid 15 milliLiter(s) Swish and Spit two times a day  heparin  Injectable 5000 Unit(s) SubCutaneous every 8 hours  pantoprazole   Suspension 40 milliGRAM(s) Oral before breakfast    PRN MEDICATIONS  acetaminophen    Suspension 650 milliGRAM(s) Oral every 4 hours PRN  lactulose Syrup 15 Gram(s) Oral two times a day PRN    VITALS:   T(F): 97  HR: 98  BP: 110/61  RR: 20  SpO2: 96%    LABS:                        12.5   6.12  )-----------( 200      ( 28 Feb 2018 06:11 )             35.8     02-28    136  |  101  |  9<L>  ----------------------------<  106  5.1<H>   |  29  |  0.4<L>    Ca    10.3<H>      28 Feb 2018 06:11                Culture - Sputum (collected 27 Feb 2018 14:08)  Source: .Sputum Sputum  Gram Stain (28 Feb 2018 05:22):    Few Squamous epithelial cells per low power field    Few polymorphonuclear leukocytes per low power field    Few Gram variable coccobacilli per oil power field          RADIOLOGY:  < from: Xray Chest 1 View- PORTABLE-Routine (02.27.18 @ 17:50) >  Impression:      Support devices as described.    No acute opacifications or effusions.    < end of copied text >      PHYSICAL EXAM:  GEN: No acute distress  LUNGS: Clear to auscultation bilaterally   HEART: S1/S2 present. RRR.   ABD: Soft, non-tender, non-distended. Bowel sounds present  EXT: No edema  NEURO: Awake not following commands.

## 2018-03-01 LAB
-  AMPICILLIN/SULBACTAM: SIGNIFICANT CHANGE UP
-  CEFAZOLIN: SIGNIFICANT CHANGE UP
-  CIPROFLOXACIN: SIGNIFICANT CHANGE UP
-  CLINDAMYCIN: SIGNIFICANT CHANGE UP
-  ERYTHROMYCIN: SIGNIFICANT CHANGE UP
-  GENTAMICIN: SIGNIFICANT CHANGE UP
-  LEVOFLOXACIN: SIGNIFICANT CHANGE UP
-  MOXIFLOXACIN(AEROBIC): SIGNIFICANT CHANGE UP
-  OXACILLIN: SIGNIFICANT CHANGE UP
-  PENICILLIN: SIGNIFICANT CHANGE UP
-  RIFAMPIN: SIGNIFICANT CHANGE UP
-  TETRACYCLINE: SIGNIFICANT CHANGE UP
-  TRIMETHOPRIM/SULFAMETHOXAZOLE: SIGNIFICANT CHANGE UP
-  VANCOMYCIN: SIGNIFICANT CHANGE UP
ANION GAP SERPL CALC-SCNC: 9 MMOL/L — SIGNIFICANT CHANGE UP (ref 7–14)
BUN SERPL-MCNC: 11 MG/DL — SIGNIFICANT CHANGE UP (ref 10–20)
CALCIUM SERPL-MCNC: 10.3 MG/DL — HIGH (ref 8.5–10.1)
CHLORIDE SERPL-SCNC: 102 MMOL/L — SIGNIFICANT CHANGE UP (ref 98–110)
CO2 SERPL-SCNC: 28 MMOL/L — SIGNIFICANT CHANGE UP (ref 17–32)
CREAT SERPL-MCNC: 0.4 MG/DL — LOW (ref 0.7–1.5)
CULTURE RESULTS: SIGNIFICANT CHANGE UP
GLUCOSE SERPL-MCNC: 111 MG/DL — HIGH (ref 70–110)
HCT VFR BLD CALC: 35 % — LOW (ref 37–47)
HGB BLD-MCNC: 12.3 G/DL — LOW (ref 14–18)
MCHC RBC-ENTMCNC: 31.7 PG — HIGH (ref 27–31)
MCHC RBC-ENTMCNC: 35.1 G/DL — SIGNIFICANT CHANGE UP (ref 32–37)
MCV RBC AUTO: 90.2 FL — SIGNIFICANT CHANGE UP (ref 81–91)
METHOD TYPE: SIGNIFICANT CHANGE UP
NRBC # BLD: 0 /100 WBCS — SIGNIFICANT CHANGE UP (ref 0–0)
ORGANISM # SPEC MICROSCOPIC CNT: SIGNIFICANT CHANGE UP
ORGANISM # SPEC MICROSCOPIC CNT: SIGNIFICANT CHANGE UP
PLATELET # BLD AUTO: 221 K/UL — SIGNIFICANT CHANGE UP (ref 130–400)
POTASSIUM SERPL-MCNC: 4 MMOL/L — SIGNIFICANT CHANGE UP (ref 3.5–5)
POTASSIUM SERPL-SCNC: 4 MMOL/L — SIGNIFICANT CHANGE UP (ref 3.5–5)
RBC # BLD: 3.88 M/UL — LOW (ref 4.2–5.4)
RBC # FLD: 13.1 % — SIGNIFICANT CHANGE UP (ref 11.5–14.5)
SODIUM SERPL-SCNC: 139 MMOL/L — SIGNIFICANT CHANGE UP (ref 135–146)
SPECIMEN SOURCE: SIGNIFICANT CHANGE UP
WBC # BLD: 7.9 K/UL — SIGNIFICANT CHANGE UP (ref 4.8–10.8)
WBC # FLD AUTO: 7.9 K/UL — SIGNIFICANT CHANGE UP (ref 4.8–10.8)

## 2018-03-01 RX ADMIN — CHLORHEXIDINE GLUCONATE 15 MILLILITER(S): 213 SOLUTION TOPICAL at 05:12

## 2018-03-01 RX ADMIN — PANTOPRAZOLE SODIUM 40 MILLIGRAM(S): 20 TABLET, DELAYED RELEASE ORAL at 06:47

## 2018-03-01 RX ADMIN — HEPARIN SODIUM 5000 UNIT(S): 5000 INJECTION INTRAVENOUS; SUBCUTANEOUS at 14:39

## 2018-03-01 RX ADMIN — HEPARIN SODIUM 5000 UNIT(S): 5000 INJECTION INTRAVENOUS; SUBCUTANEOUS at 21:06

## 2018-03-01 RX ADMIN — CHLORHEXIDINE GLUCONATE 15 MILLILITER(S): 213 SOLUTION TOPICAL at 17:23

## 2018-03-01 RX ADMIN — HEPARIN SODIUM 5000 UNIT(S): 5000 INJECTION INTRAVENOUS; SUBCUTANEOUS at 05:12

## 2018-03-01 NOTE — PROGRESS NOTE ADULT - SUBJECTIVE AND OBJECTIVE BOX
SUBJECTIVE:    Patient is a 27y old Female who presents with a chief complaint of Opioid overdose (27 Feb 2018 15:44)    Currently admitted to medicine with the primary diagnosis of Acute respiratory failure, unspecified whether with hypoxia or hypercapnia     Today is hospital day 34d. This morning she is resting comfortably in bed and reports no new issues or overnight events.   Spoke with mother at bedside as per mother she is improving, her emotional status is better, she is laughing.    PAST MEDICAL & SURGICAL HISTORY - none     SOCIAL HISTORY:  Negative for smoking/alcohol/drug use.     ALLERGIES:  No Known Allergies    MEDICATIONS:  STANDING MEDICATIONS  chlorhexidine 0.12% Liquid 15 milliLiter(s) Swish and Spit two times a day  heparin  Injectable 5000 Unit(s) SubCutaneous every 8 hours  pantoprazole   Suspension 40 milliGRAM(s) Oral before breakfast    PRN MEDICATIONS  acetaminophen    Suspension 650 milliGRAM(s) Oral every 4 hours PRN  lactulose Syrup 15 Gram(s) Oral two times a day PRN    VITALS:   T(F): 97  HR: 98  BP: 110/61  RR: 20  SpO2: 96%    LABS:                        12.5   6.12  )-----------( 200      ( 28 Feb 2018 06:11 )             35.8     02-28    136  |  101  |  9<L>  ----------------------------<  106  5.1<H>   |  29  |  0.4<L>    Ca    10.3<H>      28 Feb 2018 06:11                Culture - Sputum (collected 27 Feb 2018 14:08)  Source: .Sputum Sputum  Gram Stain (28 Feb 2018 05:22):    Few Squamous epithelial cells per low power field    Few polymorphonuclear leukocytes per low power field    Few Gram variable coccobacilli per oil power field          RADIOLOGY:  < from: Xray Chest 1 View- PORTABLE-Routine (02.27.18 @ 17:50) >  Impression:      Support devices as described.    No acute opacifications or effusions.    < end of copied text >      PHYSICAL EXAM:  GEN: No acute distress  LUNGS: Clear to auscultation bilaterally   HEART: S1/S2 present. RRR.   ABD: Soft, non-tender, non-distended. Bowel sounds present  EXT: No edema  NEURO: Awake not following commands.  Trach in place and nasal cannula

## 2018-03-01 NOTE — PROGRESS NOTE ADULT - ASSESSMENT
Assessment and Plan:   Patient is a 27y old  Female who presents with a chief complaint of mental status change. She is been treated for encephalopathy likely secondary to Substance  abuse and overdose. Patient is S/P extubation and currently status trach and status post peg. having persistent fever and treated for UTI also.    #)Speech therapy recommended changing trach to uncuffed fenestrated size 6 for speech valve.  Now she had cuffed endotracheal tube of size 8, Spoke with mother at bedside she is ok to change trach.  s/p trach on 2/5 off on ventilator more than 2 weeks  Called general surgery    #)Acute Encephalopathy likely secondary to drug overdose. mother concerns patient may have taken something while she was working at night shifts.    - Patient trached at this point.    - continue to monitor for responsiveness        #)ARF secondary to drug overdose and respiratory depression   - s/p Trach   - Continue Trachea care as per unit routine    -  Pulmonary follows as needed    #)Acute cystitis Resolved   Completed antibiotic course, D/joanne antibiotics as per ID      DVT ppx - sq heparin  GI ppx    Spoke with mother at bedside  Dispo: after trach change D/C planning  ALSO AWAIT FOR FINANCIAL CLEARANCE FROM FAMILY.

## 2018-03-01 NOTE — PROGRESS NOTE ADULT - SUBJECTIVE AND OBJECTIVE BOX
infectious diseases progress note:  MICHELLE DRISCOLL is a 27yFemale patient    OVERDOSE    Acute cystitis without hematuria  Encephalopathy  Drug overdose, undetermined intent, initial encounter  Acute respiratory failure, unspecified whether with hypoxia or hypercapnia      ROS:  CONSTITUTIONAL:  Negative fever or chills, feels well, good appetite  EYES:  Negative  blurry vision or double vision  CARDIOVASCULAR:  Negative for chest pain or palpitations  RESPIRATORY:  Negative for cough, wheezing, or SOB   GASTROINTESTINAL:  Negative for nausea, vomiting, diarrhea, constipation, or abdominal pain  GENITOURINARY:  Negative frequency, urgency or dysuria  NEUROLOGIC:  No headache, confusion, dizziness, lightheadedness    Allergies    No Known Allergies    Intolerances        ANTIBIOTICS/RELEVANT:  antimicrobials    immunologic:    OTHER:  acetaminophen    Suspension 650 milliGRAM(s) Oral every 4 hours PRN  chlorhexidine 0.12% Liquid 15 milliLiter(s) Swish and Spit two times a day  heparin  Injectable 5000 Unit(s) SubCutaneous every 8 hours  lactulose Syrup 15 Gram(s) Oral two times a day PRN  pantoprazole   Suspension 40 milliGRAM(s) Oral before breakfast      Objective:  T(F): 97.8 (03-01-18 @ 07:59), Max: 98.3 (02-28-18 @ 15:36)  HR: 97 (03-01-18 @ 08:20) (95 - 103)  BP: 129/80 (03-01-18 @ 07:59) (119/20 - 129/80)  RR: 20 (03-01-18 @ 07:59) (18 - 20)  SpO2: 99% (03-01-18 @ 08:20) (98% - 99%)    PHYSICAL EXAM  Constitutional:Well-developed, well nourished  Eyes:SHELLIE, EOMI  Ear/Nose/Throat: no oral lesion, no sinus tenderness on percussion	  Neck:no JVD, no lymphadenopathy, supple  Respiratory: CTA dinesh  Cardiovascular: S1S2 RRR, no murmurs  Gastrointestinal:soft, (+) BS, no HSM  Extremities:no e/e/c    03-01    139  |  102  |  11  ----------------------------<  111<H>  4.0   |  28  |  0.4<L>                                12.3   7.90  )-----------( 221      ( 01 Mar 2018 08:29 )             35.0           Culture - Sputum (collected 27 Feb 2018 14:08)  Source: .Sputum Sputum  Gram Stain (28 Feb 2018 05:22):    Few Squamous epithelial cells per low power field    Few polymorphonuclear leukocytes per low power field    Few Gram variable coccobacilli per oil power field  Preliminary Report (28 Feb 2018 21:05):    Numerous Staphylococcus aureus    Normal Respiratory Natalia present

## 2018-03-01 NOTE — SWALLOW BEDSIDE ASSESSMENT ADULT - SWALLOW EVAL: DIAGNOSIS
+coughing and blue dye return noted when SLP suctioned trach tube after po trials of nectar thick liquids

## 2018-03-02 LAB
ANION GAP SERPL CALC-SCNC: 7 MMOL/L — SIGNIFICANT CHANGE UP (ref 7–14)
BUN SERPL-MCNC: 10 MG/DL — SIGNIFICANT CHANGE UP (ref 10–20)
CALCIUM SERPL-MCNC: 10.2 MG/DL — HIGH (ref 8.5–10.1)
CHLORIDE SERPL-SCNC: 103 MMOL/L — SIGNIFICANT CHANGE UP (ref 98–110)
CO2 SERPL-SCNC: 27 MMOL/L — SIGNIFICANT CHANGE UP (ref 17–32)
CREAT SERPL-MCNC: 0.5 MG/DL — LOW (ref 0.7–1.5)
GLUCOSE SERPL-MCNC: 99 MG/DL — SIGNIFICANT CHANGE UP (ref 70–110)
HCT VFR BLD CALC: 34.4 % — LOW (ref 37–47)
HGB BLD-MCNC: 12 G/DL — SIGNIFICANT CHANGE UP (ref 12–16)
MCHC RBC-ENTMCNC: 31.5 PG — HIGH (ref 27–31)
MCHC RBC-ENTMCNC: 34.9 G/DL — SIGNIFICANT CHANGE UP (ref 32–37)
MCV RBC AUTO: 90.3 FL — SIGNIFICANT CHANGE UP (ref 81–99)
NRBC # BLD: 0 /100 WBCS — SIGNIFICANT CHANGE UP (ref 0–0)
PLATELET # BLD AUTO: 209 K/UL — SIGNIFICANT CHANGE UP (ref 130–400)
POTASSIUM SERPL-MCNC: 4.4 MMOL/L — SIGNIFICANT CHANGE UP (ref 3.5–5)
POTASSIUM SERPL-SCNC: 4.4 MMOL/L — SIGNIFICANT CHANGE UP (ref 3.5–5)
RBC # BLD: 3.81 M/UL — LOW (ref 4.2–5.4)
RBC # FLD: 13.1 % — SIGNIFICANT CHANGE UP (ref 11.5–14.5)
SODIUM SERPL-SCNC: 137 MMOL/L — SIGNIFICANT CHANGE UP (ref 135–146)
WBC # BLD: 6.36 K/UL — SIGNIFICANT CHANGE UP (ref 4.8–10.8)
WBC # FLD AUTO: 6.36 K/UL — SIGNIFICANT CHANGE UP (ref 4.8–10.8)

## 2018-03-02 RX ADMIN — HEPARIN SODIUM 5000 UNIT(S): 5000 INJECTION INTRAVENOUS; SUBCUTANEOUS at 15:25

## 2018-03-02 RX ADMIN — HEPARIN SODIUM 5000 UNIT(S): 5000 INJECTION INTRAVENOUS; SUBCUTANEOUS at 21:34

## 2018-03-02 RX ADMIN — CHLORHEXIDINE GLUCONATE 15 MILLILITER(S): 213 SOLUTION TOPICAL at 17:35

## 2018-03-02 RX ADMIN — PANTOPRAZOLE SODIUM 40 MILLIGRAM(S): 20 TABLET, DELAYED RELEASE ORAL at 06:09

## 2018-03-02 RX ADMIN — HEPARIN SODIUM 5000 UNIT(S): 5000 INJECTION INTRAVENOUS; SUBCUTANEOUS at 05:36

## 2018-03-02 RX ADMIN — CHLORHEXIDINE GLUCONATE 15 MILLILITER(S): 213 SOLUTION TOPICAL at 05:36

## 2018-03-02 NOTE — PROGRESS NOTE ADULT - ASSESSMENT
27 year old female s/p trach and PEG who dislodged her PEG today. Family does not want replacement of PEG due to her tolerating PO intake. Patient discussed with Dr Shirley. No indication or surgical intervention at this point.

## 2018-03-02 NOTE — PROGRESS NOTE ADULT - SUBJECTIVE AND OBJECTIVE BOX
MICHELLE DRISCOLL [id number]  27y Female    HPI:  27 year old female s/p trach and PEG on 2/5. Patient pulled out PEG today but has been clinically stable. There was no complaint of abdominal pain and she vital signs were stable throughout. Family actually started feeding her orally and she has been tolerating it well with no distress. When offered to have the PEG replaced, the family is insistent on not having it done since she has been tolerating PO intake well. It was agreed that the patterson be left in to keep the tract open but that the PEG will not replaced at this point given the stable clinical picture.    PAST MEDICAL & SURGICAL HISTORY:        MEDICATIONS  (STANDING):  chlorhexidine 0.12% Liquid 15 milliLiter(s) Swish and Spit two times a day  heparin  Injectable 5000 Unit(s) SubCutaneous every 8 hours  pantoprazole   Suspension 40 milliGRAM(s) Oral before breakfast    MEDICATIONS  (PRN):  acetaminophen    Suspension 650 milliGRAM(s) Oral every 4 hours PRN For Temp greater than 38 C (100.4 F)  lactulose Syrup 15 Gram(s) Oral two times a day PRN constipation      Allergies    No Known Allergies    Intolerances        REVIEW OF SYSTEMS    [ ] A ten-point review of systems was otherwise negative except as noted.  [ ] Due to altered mental status/intubation, subjective information were not able to be obtained from the patient. History was obtained, to the extent possible, from review of the chart and collateral sources of information.      Vital Signs Last 24 Hrs  T(C): 36.7 (02 Mar 2018 15:34), Max: 36.7 (02 Mar 2018 15:34)  T(F): 98 (02 Mar 2018 15:34), Max: 98 (02 Mar 2018 15:34)  HR: 96 (02 Mar 2018 15:34) (94 - 119)  BP: 130/78 (02 Mar 2018 15:34) (120/78 - 130/78)  BP(mean): 103 (02 Mar 2018 07:40) (95 - 103)  RR: 20 (02 Mar 2018 15:34) (20 - 20)  SpO2: 98% (02 Mar 2018 16:12) (97% - 99%)    PHYSICAL EXAM:  GENERAL: NAD, well-appearing  CHEST/LUNG: Clear to auscultation bilaterally  HEART: Regular rate and rhythm  ABDOMEN: Soft, Nontender, Nondistended; Bowel sounds present  EXTREMITIES:  No clubbing, cyanosis, or edema      LABS:  Labs:  CAPILLARY BLOOD GLUCOSE                              12.0   6.36  )-----------( 209      ( 02 Mar 2018 06:05 )             34.4         03-02    137  |  103  |  10  ----------------------------<  99  4.4   |  27  |  0.5<L>      Calcium, Total Serum: 10.2 mg/dL (03-02-18 @ 06:05)

## 2018-03-02 NOTE — PROGRESS NOTE ADULT - SUBJECTIVE AND OBJECTIVE BOX
SUBJECTIVE:    Patient is a 27y old Female who presents with a chief complaint of Opioid overdose (27 Feb 2018 15:44)    Currently admitted to medicine with the primary diagnosis of Acute respiratory failure, unspecified whether with hypoxia or hypercapnia     Today is hospital day 36d. This morning she is resting comfortably in bed and reports no new issues or overnight events.     PAST MEDICAL & SURGICAL HISTORY    SOCIAL HISTORY:  Negative for smoking/alcohol/drug use.     ALLERGIES:  No Known Allergies    MEDICATIONS:  STANDING MEDICATIONS  chlorhexidine 0.12% Liquid 15 milliLiter(s) Swish and Spit two times a day  heparin  Injectable 5000 Unit(s) SubCutaneous every 8 hours  pantoprazole   Suspension 40 milliGRAM(s) Oral before breakfast    PRN MEDICATIONS  acetaminophen    Suspension 650 milliGRAM(s) Oral every 4 hours PRN  lactulose Syrup 15 Gram(s) Oral two times a day PRN    VITALS:   T(F): 97.3  HR: 99  BP: 128/85  RR: 20  SpO2: 99%    LABS:                        12.0   6.36  )-----------( 209      ( 02 Mar 2018 06:05 )             34.4     03-02    137  |  103  |  10  ----------------------------<  99  4.4   |  27  |  0.5<L>    Ca    10.2<H>      02 Mar 2018 06:05                Culture - Sputum (collected 27 Feb 2018 14:08)  Source: .Sputum Sputum  Gram Stain (28 Feb 2018 05:22):    Few Squamous epithelial cells per low power field    Few polymorphonuclear leukocytes per low power field    Few Gram variable coccobacilli per oil power field  Final Report (01 Mar 2018 18:40):    Numerous Staphylococcus aureus    Normal Respiratory Natalia present  Organism: Staphylococcus aureus (01 Mar 2018 18:40)  Organism: Staphylococcus aureus (01 Mar 2018 18:40)          RADIOLOGY:    PHYSICAL EXAM:  GEN: No acute distress  LUNGS: Clear to auscultation bilaterally   HEART: S1/S2 present. RRR.   ABD: Soft, +BS  EXT: No edema  NEURO: Awake not following commands. SUBJECTIVE:    Patient is a 27y old Female who presents with a chief complaint of Opioid overdose (27 Feb 2018 15:44)    Currently admitted to medicine with the primary diagnosis of Acute respiratory failure, unspecified whether with hypoxia or hypercapnia     Today is hospital day 36d. This morning she is resting comfortably in bed and reports no new issues or overnight events.     PAST MEDICAL & SURGICAL HISTORY    SOCIAL HISTORY:  Negative for smoking/alcohol/drug use.     ALLERGIES:  No Known Allergies    MEDICATIONS:  STANDING MEDICATIONS  chlorhexidine 0.12% Liquid 15 milliLiter(s) Swish and Spit two times a day  heparin  Injectable 5000 Unit(s) SubCutaneous every 8 hours  pantoprazole   Suspension 40 milliGRAM(s) Oral before breakfast    PRN MEDICATIONS  acetaminophen    Suspension 650 milliGRAM(s) Oral every 4 hours PRN  lactulose Syrup 15 Gram(s) Oral two times a day PRN    VITALS:   T(F): 97.3  HR: 99  BP: 128/85  RR: 20  SpO2: 99%    LABS:                        12.0   6.36  )-----------( 209      ( 02 Mar 2018 06:05 )             34.4     03-02    137  |  103  |  10  ----------------------------<  99  4.4   |  27  |  0.5<L>    Ca    10.2<H>      02 Mar 2018 06:05                Culture - Sputum (collected 27 Feb 2018 14:08)  Source: .Sputum Sputum  Gram Stain (28 Feb 2018 05:22):    Few Squamous epithelial cells per low power field    Few polymorphonuclear leukocytes per low power field    Few Gram variable coccobacilli per oil power field  Final Report (01 Mar 2018 18:40):    Numerous Staphylococcus aureus    Normal Respiratory Natalia present  Organism: Staphylococcus aureus (01 Mar 2018 18:40)  Organism: Staphylococcus aureus (01 Mar 2018 18:40)          RADIOLOGY:    PHYSICAL EXAM:  GEN: No acute distress  LUNGS: Clear to auscultation bilaterally   HEART: S1/S2 present. RRR.   ABD: Soft, +BS  EXT: No edema, Contracted upper extremities.  NEURO: Awake not following commands.

## 2018-03-02 NOTE — PROGRESS NOTE ADULT - SUBJECTIVE AND OBJECTIVE BOX
EVIE DRISCOLL  27y, Female  Allergy: No Known Allergies    LAST 24-Hr EVENTS:    patient seen and examined independently  on rounds- mother bedside and reports Evie has been in better   mood over last few days (smiling)- at time of bedside visit natonio was upset and tearful- kicking- 0oob to chair- she then pulled out her peg tube (which was replaced by yesenia emergently adn surgery called to replace)-  also patient tolerating minimun oral intake     PE:  GEN-awake, eyes open, unable to follow commands or communicate verbally  CHEST- +trach, decreased air entry bilaterally  CVS- +s1/s2 +tachycardic  ABD- soft NT ND +bs, +PEG site c/d/i  EXT-+contracted upper extremities        PAST MEDICAL & SURGICAL HISTORY:      VITALS:  T(F): 98 (03-02-18 @ 15:34), Max: 98 (03-02-18 @ 15:34)  HR: 96 (03-02-18 @ 15:34)  BP: 130/78 (03-02-18 @ 15:34) (120/78 - 130/78)  RR: 20 (03-02-18 @ 15:34)  SpO2: 98% (03-02-18 @ 16:12)      TESTS & MEASUREMENTS:  Weight (Kg):     02-28-18 @ 07:01  -  03-01-18 @ 07:00  --------------------------------------------------------  IN: 1700 mL / OUT: 0 mL / NET: 1700 mL    03-01-18 @ 07:01  -  03-02-18 @ 07:00  --------------------------------------------------------  IN: 1700 mL / OUT: 0 mL / NET: 1700 mL    03-02-18 @ 07:01  -  03-02-18 @ 17:01  --------------------------------------------------------  IN: 0 mL / OUT: 1 mL / NET: -1 mL                            12.0   6.36  )-----------( 209      ( 02 Mar 2018 06:05 )             34.4       03-02    137  |  103  |  10  ----------------------------<  99  4.4   |  27  |  0.5<L>    Ca    10.2<H>      02 Mar 2018 06:05              Culture - Sputum (collected 02-27-18 @ 14:08)  Source: .Sputum Sputum  Gram Stain (02-28-18 @ 05:22):    Few Squamous epithelial cells per low power field    Few polymorphonuclear leukocytes per low power field    Few Gram variable coccobacilli per oil power field  Final Report (03-01-18 @ 18:40):    Numerous Staphylococcus aureus    Normal Respiratory Natalia present  Organism: Staphylococcus aureus (03-01-18 @ 18:40)  Organism: Staphylococcus aureus (03-01-18 @ 18:40)      -  Ampicillin/Sulbactam: S <=8/4      -  Cefazolin: S <=4      -  Ciprofloxacin: S <=1      -  Clindamycin: S <=0.25      -  Erythromycin: S <=0.25      -  Gentamicin: S <=1      -  Levofloxacin: S <=0.5      -  Moxifloxacin(Aerobic): S <=0.5      -  Oxacillin: S <=0.25      -  Penicillin: R 8      -  RIF- Rifampin: S <=1      -  Tetra/Doxy: S <=1      -  Trimethoprim/Sulfamethoxazole: S <=0.5/9.5      -  Vancomycin: S 2      Method Type: SHARAN          RADIOLOGY & ADDITIONAL TESTS:    RECENT DIAGNOSTIC ORDERS:      MEDICATIONS:  MEDICATIONS  (STANDING):  chlorhexidine 0.12% Liquid 15 milliLiter(s) Swish and Spit two times a day  heparin  Injectable 5000 Unit(s) SubCutaneous every 8 hours  pantoprazole   Suspension 40 milliGRAM(s) Oral before breakfast    MEDICATIONS  (PRN):  acetaminophen    Suspension 650 milliGRAM(s) Oral every 4 hours PRN For Temp greater than 38 C (100.4 F)  lactulose Syrup 15 Gram(s) Oral two times a day PRN constipation      HEALTH ISSUES - PROBLEM Dx:  Acute cystitis without hematuria: Acute cystitis without hematuria  Encephalopathy: Encephalopathy  Drug overdose, undetermined intent, initial encounter: Drug overdose, undetermined intent, initial encounter  Acute respiratory failure, unspecified whether with hypoxia or hypercapnia: Acute respiratory failure, unspecified whether with hypoxia or hypercapnia

## 2018-03-02 NOTE — PROGRESS NOTE ADULT - ASSESSMENT
Assessment and Plan:   Patient is a 27y old  Female who presents with a chief complaint of mental status change. She is been treated for encephalopathy likely secondary to Substance  abuse and overdose. Patient is S/P extubation and currently s/p trach and peg. awaiitng dispo to TBI facility for placement    1. Acute Encephalopathy likely secondary to drug overdose. mother concerns patient may have taken something while she was working at night shifts.    - Patient trached at this point.    - continue to monitor for responsiveness   - slight improvement in mood  - plan for neuro follow up       2. ARF secondary to drug overdose and respiratory depression   - s/p Trach   - Continue Trachea care as per unit routine    -  Pulmonary follows as needed    3. Fevers secondary to cystitis   - Urine culture positive for E.coli   - finished course of merrem 2/26  - off antibiotics- remains afebrile- repeat cxr unremarkable   -  monitor wbc and fever curve closely- low threshold to restart abx if concern for new infection-- ID following    4. PEG- dislodged (patient pulled out peg today)  -f/u with surgery plan for peg replacement  -continue to advance diet as tolerated---aspiration precautions      DVT ppx - sq heparin  GI ppx    D/W mother.   D/C planning ONCE FINANCIAL CLEARANCE IS MADE WITH FAMILY REGARDING PATIENTS PROPERTY .   involved    FULL CODE- guarded prognosis

## 2018-03-02 NOTE — PROGRESS NOTE ADULT - ASSESSMENT
Assessment and Plan:   Patient is a 27y old  Female who presents with a chief complaint of mental status change. She is been treated for encephalopathy likely secondary to Substance  abuse and overdose. Patient is S/P extubation and currently status trach and status post peg. having persistent fever and treated for UTI also.    #)Speech therapy recommended changing trach to uncuffed fenestrated size 6 for speech valve.  Now she had cuffed endotracheal tube of size 8, Spoke with mother at bedside she is ok to change trach.  s/p trach on 2/5 off on ventilator more than 2 weeks  Called general surgery waiting for Dr. Shirley to come and assess her    #)Acute Encephalopathy likely secondary to drug overdose. mother concerns patient may have taken something while she was working at night shifts.    - Patient trached at this point.    - continue to monitor for responsiveness        #)ARF secondary to drug overdose and respiratory depression   - s/p Trach   - Continue Trachea care as per unit routine    -  Pulmonary follows as needed    #)Acute cystitis Resolved   Completed antibiotic course, D/joanne antibiotics as per ID      DVT ppx - sq heparin  GI ppx    Spoke with mother at bedside  Dispo: after trach change D/C planning  ALSO AWAIT FOR FINANCIAL CLEARANCE FROM FAMILY. Assessment and Plan:   Patient is a 27y old  Female who presents with a chief complaint of mental status change. She is been treated for encephalopathy likely secondary to Substance  abuse and overdose. Patient is S/P extubation and currently status trach and status post peg. having persistent fever and treated for UTI also.    #)Speech therapy recommended changing trach to uncuffed fenestrated size 6 for speech valve.  Now she had cuffed endotracheal tube of size 8. If surgery don't change the valve by monday will call pulmonary do it.    #)Neuro evaluation as per family request-still poor prognosis might benefit from occupational therapy.    #)Dislodged PEG tube: called vascular to come and evaluate  But family doesn't want PEG tube for now. will advance diet as tolerated, Aspiration Precautions.    #)Acute Encephalopathy likely secondary to drug overdose.    - Patient trached at this point.    - continue to monitor for responsiveness      #)ARF secondary to drug overdose and respiratory depression   - s/p Trach   - Continue Trachea care as per unit routine    -  Pulmonary follows as needed    #)Acute cystitis Resolved   Completed antibiotic course, D/joanne antibiotics as per ID        DVT ppx - sq heparin  GI ppx    Spoke with mother at bedside  Dispo: after trach change D/C planning  ALSO AWAIT FOR FINANCIAL CLEARANCE FROM FAMILY.

## 2018-03-02 NOTE — PROGRESS NOTE ADULT - SUBJECTIVE AND OBJECTIVE BOX
Neurology Follow up note    Name  MICHELLE DRISCOLL      Interval History - Pt was examined at bedside. Spoke with the mother, SHe has been improving a lot. Plan discussed with mother.    Vital Signs Last 24 Hrs  T(C): 36.3 (02 Mar 2018 07:40), Max: 36.4 (01 Mar 2018 15:49)  T(F): 97.3 (02 Mar 2018 07:40), Max: 97.5 (01 Mar 2018 15:49)  HR: 119 (02 Mar 2018 08:15) (94 - 119)  BP: 128/85 (02 Mar 2018 07:40) (120/78 - 128/85)  BP(mean): 103 (02 Mar 2018 07:40) (92 - 103)  RR: 20 (02 Mar 2018 07:40) (18 - 20)  SpO2: 97% (02 Mar 2018 08:15) (97% - 100%)          Neurological Exam:   Mental status: Hard to tell she is awake was eating and swallowing fine with the help of her mother. kicking her legs. Withdraws to painful stimuli Started smiling when I showed her book of [employer]      As per mother she follows commands but she was not following any commands on examination     Medications  acetaminophen    Suspension 650 milliGRAM(s) Oral every 4 hours PRN  chlorhexidine 0.12% Liquid 15 milliLiter(s) Swish and Spit two times a day  heparin  Injectable 5000 Unit(s) SubCutaneous every 8 hours  lactulose Syrup 15 Gram(s) Oral two times a day PRN  pantoprazole   Suspension 40 milliGRAM(s) Oral before breakfast      Lab                        12.0   6.36  )-----------( 209      ( 02 Mar 2018 06:05 )             34.4   03-02    137  |  103  |  10  ----------------------------<  99  4.4   |  27  |  0.5<L>    Ca    10.2<H>      02 Mar 2018 06:05        Radiology  MRI: Extensive white matter diffusion changes sparing of u fibres     Assessment-  28 yo f comes presented with ARF and toxic encephalopathy secondary to substance abuse.     # AMS   - Pt is improving as per mother but prognosis is still poor. Pt might get help from occupational therapy  - Pt pulled out peg tube but she is able to eat. Speech swallow follow up whether is it ok to advance the diet Neurology Follow up note    Name  MICHELLE DRISCOLL      Interval History - Pt was examined at bedside. Spoke with the mother, SHe has been improving a lot. Plan discussed with mother.    Vital Signs Last 24 Hrs  T(C): 36.3 (02 Mar 2018 07:40), Max: 36.4 (01 Mar 2018 15:49)  T(F): 97.3 (02 Mar 2018 07:40), Max: 97.5 (01 Mar 2018 15:49)  HR: 119 (02 Mar 2018 08:15) (94 - 119)  BP: 128/85 (02 Mar 2018 07:40) (120/78 - 128/85)  BP(mean): 103 (02 Mar 2018 07:40) (92 - 103)  RR: 20 (02 Mar 2018 07:40) (18 - 20)  SpO2: 97% (02 Mar 2018 08:15) (97% - 100%)          Neurological Exam:   Mental status: Pt is awake was eating and swallowing fine with the help of her mother. kicking her legs. Withdraws to painful stimuli Started smiling when I showed her book of Adrienne DEMPSEY moving less than LUE. Occasionally tracks   As per mother she follows commands but she was not following any commands on examination     Medications  acetaminophen    Suspension 650 milliGRAM(s) Oral every 4 hours PRN  chlorhexidine 0.12% Liquid 15 milliLiter(s) Swish and Spit two times a day  heparin  Injectable 5000 Unit(s) SubCutaneous every 8 hours  lactulose Syrup 15 Gram(s) Oral two times a day PRN  pantoprazole   Suspension 40 milliGRAM(s) Oral before breakfast      Lab                        12.0   6.36  )-----------( 209      ( 02 Mar 2018 06:05 )             34.4   03-02    137  |  103  |  10  ----------------------------<  99  4.4   |  27  |  0.5<L>    Ca    10.2<H>      02 Mar 2018 06:05        Radiology  MRI: Extensive white matter diffusion changes sparing of u fibres     Assessment-  26 yo f comes presented with ARF and toxic encephalopathy secondary to substance abuse.     # AMS   - Pt is improving as per mother but prognosis for furll neurological recovery is still poor.   -PT/OT  - Pt pulled out peg tube but she is able to eat. Speech swallow follow up whether is it ok to advance the diet

## 2018-03-02 NOTE — PROGRESS NOTE ADULT - ATTENDING COMMENTS
For dysphagia, primary service and family requesting PEG as well for long-term nutrition
Mother states that over the past few days, pt MS is improving. She is smiling and gesturing and cooperating.  MRI results reviewed with mother. The clinical improvement given clinical course and MRI findings is indeed encouraging, however full neurological recovery is still doubtful.   I had a detailed discussion with the mother about condition/management/prognosis, and answered all her questions.   Recall with questions.
Patient seen and examined at the bed side and agree with above note.  D/W mother.   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   Mother concern that patient may have taken something to keep her awake at night time shifts.     Admitted and treated for the ARF and also Anoxic brain Injury, barely responsive now and prognosis is guarded now.     Fever persistent - Likely cant ral origin. However since Urine culture is positive will treat with meropenem (sensitive) for one week.     C/W RECTAL TYLENOL RTC.   D/W MOTHER IN THE BED SIDE.
Patient seen and examined at the bed side and d/w family and house staff.   Await financial issues clearance from the family / mother
Patient seen and examined at the bed side.   D/C Family, resident and nursing.   D/C planning.   Complete meropenem  Await financial clearance
Patient seen and examined at the bed side.   D/C Family, resident and nursing.   D/C planning.   Complete meropenem  Await financial clearance
Patient seen and examined at the bed side.   D/C Family, resident and nursing.   D/C planning.   D/C meropenem AFTER TODAYS DOSE  Await financial clearance
Patient seen and examined at the bed side.   D/C Family, resident and nursing.   D/C planning.   S/P Meropenem  Await financial clearance for discharge to SNF
Patient seen and examined at the bed side.   D/C Mother, resident and nursing.   D/C planning.   Complete meropenem
This is a 27 year old female with PMH of Asperger who was found unconscious by family members secondary to drug overdose currently with minimally conscious state off sedation with evidence of hypoxia on neuroimaging.  Likely anoxic brain injury with minimally conscious state. Discussed with patient family at length.  Prognosis for meaningful recovery likely poor however family wants to proceed with Trach/PEG.      - trach/PEG scheduled  - cont supportive care  - will need longterm SNF  - call with questions
for trach Monday 2/5
Director of Service-I fully agree with the resident's  exam/assessment/plan after my independent/exam/assessment/plan    follow up sensitivities; dispo pending
Patient seen and examined at the bed side and d/w family and house staff.   Await financial issues clearance from the family / mother    Evaluate to trail of changing the trach

## 2018-03-03 RX ADMIN — HEPARIN SODIUM 5000 UNIT(S): 5000 INJECTION INTRAVENOUS; SUBCUTANEOUS at 05:30

## 2018-03-03 RX ADMIN — HEPARIN SODIUM 5000 UNIT(S): 5000 INJECTION INTRAVENOUS; SUBCUTANEOUS at 21:06

## 2018-03-03 RX ADMIN — HEPARIN SODIUM 5000 UNIT(S): 5000 INJECTION INTRAVENOUS; SUBCUTANEOUS at 14:00

## 2018-03-03 RX ADMIN — CHLORHEXIDINE GLUCONATE 15 MILLILITER(S): 213 SOLUTION TOPICAL at 05:32

## 2018-03-03 RX ADMIN — PANTOPRAZOLE SODIUM 40 MILLIGRAM(S): 20 TABLET, DELAYED RELEASE ORAL at 06:05

## 2018-03-03 NOTE — PROGRESS NOTE ADULT - ASSESSMENT
Assessment and Plan:   Patient is a 27y old  Female who presents with a chief complaint of mental status change. She is been treated for encephalopathy likely secondary to Substance  abuse and overdose. Patient is S/P extubation and currently status trach and status post peg. having persistent fever and treated for UTI also.    #)Speech therapy recommended changing trach to uncuffed fenestrated size 6 for speech valve.  Now she had cuffed endotracheal tube of size 8. Hold off on trach change for now her mother will let us know by monday.    #)Neuro evaluation as per family request-still poor prognosis might benefit from occupational therapy.    #)Dislodged PEG tube: called vascular to come and evaluate  But family doesn't want PEG tube for now. As per mother she is tolerating feeds, will advance diet as tolerated, Aspiration Precautions.    #)Acute Encephalopathy likely secondary to drug overdose.    - Patient trached at this point.      #)ARF secondary to drug overdose and respiratory depression   - s/p Trach   - Continue Trachea care as per unit routine    -  Pulmonary follows as needed    #)Acute cystitis Resolved   Completed antibiotic course, D/joanne antibiotics as per ID        DVT ppx - sq heparin  GI ppx    Spoke with mother at bedside  Dispo:Hold off on  trach change for now,  D/C planning  ALSO AWAIT FOR FINANCIAL CLEARANCE FROM FAMILY. Assessment and Plan:   Patient is a 27y old  Female who presents with a chief complaint of mental status change. She is been treated for encephalopathy likely secondary to Substance  abuse and overdose. Patient is S/P extubation and currently status trach and status post peg. having persistent fever and treated for UTI also.    #)Speech therapy recommended changing trach to uncuffed fenestrated size 6 for speech valve.  Now she had cuffed endotracheal tube of size 8. Hold off on trach change for now her mother will let us know by monday.    #)Neuro evaluation as per family request-still poor prognosis might benefit from occupational therapy.    #)Dislodged PEG tube: called vascular to come and evaluate  But family doesn't want PEG tube for now. As per mother she is tolerating feeds, will advance diet as tolerated, Aspiration Precautions. Called speech and swallow to assess her regarding her nutritional intake.    #)Acute Encephalopathy likely secondary to drug overdose.    - Patient trached at this point.      #)ARF secondary to drug overdose and respiratory depression   - s/p Trach   - Continue Trachea care as per unit routine    -  Pulmonary follows as needed    #)Acute cystitis Resolved   Completed antibiotic course, D/joanne antibiotics as per ID        DVT ppx - sq heparin  GI ppx    Spoke with mother at bedside  Dispo:Hold off on  trach change for now,  D/C planning  ALSO AWAIT FOR FINANCIAL CLEARANCE FROM FAMILY.

## 2018-03-03 NOTE — PROGRESS NOTE ADULT - SUBJECTIVE AND OBJECTIVE BOX
AMERICOMICHELLE  27y, Female  Allergy: No Known Allergies    LAST 24-Hr EVENTS:    patient seen and examined independently  on rounds- patient sitting oob in chair looking out window- mother present- unable to tolerate capping of trach (she gets agitated and upset)    PE:  GEN-awake, eyes open,nonverbal- appears comfortable (until cap put on trach)  CHEST- +trach, decreased air entry bilaterally  CVS- +s1/s2 +tachycardic  ABD- soft NT ND +bs, +PEG site  with patterson inserted (as she pulled out peg yesterday   EXT-no edema        HEALTH ISSUES - PROBLEM Dx:  Acute cystitis without hematuria: Acute cystitis without hematuria  Encephalopathy: Encephalopathy  Drug overdose, undetermined intent, initial encounter: Drug overdose, undetermined intent, initial encounter  Acute respiratory failure, unspecified whether with hypoxia or hypercapnia: Acute respiratory failure, unspecified whether with hypoxia or hypercapnia

## 2018-03-03 NOTE — PROGRESS NOTE ADULT - SUBJECTIVE AND OBJECTIVE BOX
SUBJECTIVE:    Patient is a 27y old Female who presents with a chief complaint of Opioid overdose (27 Feb 2018 15:44)    Currently admitted to medicine with the primary diagnosis of Acute respiratory failure, unspecified whether with hypoxia or hypercapnia     Today is hospital day 37d. This morning she is resting comfortably in bed and reports no new issues or overnight events.   Dislodged PEG yesterday but spoke with mother bed side she is tolerating feeds as per mother she is doing better her expressions are getting better.    PAST MEDICAL & SURGICAL HISTORY    SOCIAL HISTORY:  Negative for smoking/alcohol/drug use.     ALLERGIES:  No Known Allergies    MEDICATIONS:  STANDING MEDICATIONS  chlorhexidine 0.12% Liquid 15 milliLiter(s) Swish and Spit two times a day  heparin  Injectable 5000 Unit(s) SubCutaneous every 8 hours  pantoprazole   Suspension 40 milliGRAM(s) Oral before breakfast    PRN MEDICATIONS  acetaminophen    Suspension 650 milliGRAM(s) Oral every 4 hours PRN  lactulose Syrup 15 Gram(s) Oral two times a day PRN    VITALS:   T(F): 98.2  HR: 113  BP: 128/85  RR: 20  SpO2: 98%    LABS:                        12.0   6.36  )-----------( 209      ( 02 Mar 2018 06:05 )             34.4     03-02    137  |  103  |  10  ----------------------------<  99  4.4   |  27  |  0.5<L>    Ca    10.2<H>      02 Mar 2018 06:05                    RADIOLOGY:    PHYSICAL EXAM:  GEN: No acute distress  LUNGS: Clear to auscultation bilaterally   HEART: S1/S2 present. RRR.   ABD: Soft, non-tender, non-distended. Bowel sounds present  EXT: No edema, contractures in upper extremities  NEURO: Awake not following commands,laughing.

## 2018-03-03 NOTE — PROGRESS NOTE ADULT - ASSESSMENT
Assessment and Plan:   Patient is a 27y old  Female who presents with a chief complaint of mental status change. She is been treated for encephalopathy likely secondary to Substance  abuse and overdose. Patient is S/P extubation and currently s/p trach and peg. awaiitng dispo to TBI facility for placement    1. Acute Encephalopathy likely secondary to drug overdose. mother concerns patient may have taken something while she was working at night shifts.    - Patient trached and peg (continue daily capping trial and f/u with surgery may be able to dc trach once mental status improves)  - slight improvement in mood  - plan for neuro follow up  - patient pulled out PEG 3/2---keep patterson in place for now--continue to reassess (mother and RN states patietn tolerating oral feeding- continue aspiration precautions)-  - mother wants to wait on reinserting PEG only if really needs it     2. ARF secondary to drug overdose and respiratory depression   - s/p Trach   - Continue Trachea care as per unit routine    -  Pulmonary follows as needed    3. Fevers secondary to cystitis   - Urine culture positive for E.coli   - finished course of merrem 2/26  - off antibiotics- remains afebrile- repeat cxr unremarkable   -  monitor wbc and fever curve closely- low threshold to restart abx if concern for new infection-- ID following      DVT ppx - sq heparin  GI ppx    D/W mother.   D/C planning ONCE FINANCIAL CLEARANCE IS MADE WITH FAMILY REGARDING PATIENTS PROPERTY .   involved    FULL CODE- guarded prognosis

## 2018-03-04 LAB
ANION GAP SERPL CALC-SCNC: 8 MMOL/L — SIGNIFICANT CHANGE UP (ref 7–14)
BUN SERPL-MCNC: 10 MG/DL — SIGNIFICANT CHANGE UP (ref 10–20)
CALCIUM SERPL-MCNC: 10.2 MG/DL — HIGH (ref 8.5–10.1)
CHLORIDE SERPL-SCNC: 104 MMOL/L — SIGNIFICANT CHANGE UP (ref 98–110)
CO2 SERPL-SCNC: 27 MMOL/L — SIGNIFICANT CHANGE UP (ref 17–32)
CREAT SERPL-MCNC: 0.5 MG/DL — LOW (ref 0.7–1.5)
GLUCOSE SERPL-MCNC: 103 MG/DL — SIGNIFICANT CHANGE UP (ref 70–110)
HCT VFR BLD CALC: 35.5 % — LOW (ref 37–47)
HGB BLD-MCNC: 12.4 G/DL — SIGNIFICANT CHANGE UP (ref 12–16)
MCHC RBC-ENTMCNC: 31.7 PG — HIGH (ref 27–31)
MCHC RBC-ENTMCNC: 34.9 G/DL — SIGNIFICANT CHANGE UP (ref 32–37)
MCV RBC AUTO: 90.8 FL — SIGNIFICANT CHANGE UP (ref 81–99)
NRBC # BLD: 0 /100 WBCS — SIGNIFICANT CHANGE UP (ref 0–0)
PLATELET # BLD AUTO: 215 K/UL — SIGNIFICANT CHANGE UP (ref 130–400)
POTASSIUM SERPL-MCNC: 4.5 MMOL/L — SIGNIFICANT CHANGE UP (ref 3.5–5)
POTASSIUM SERPL-SCNC: 4.5 MMOL/L — SIGNIFICANT CHANGE UP (ref 3.5–5)
RBC # BLD: 3.91 M/UL — LOW (ref 4.2–5.4)
RBC # FLD: 13.1 % — SIGNIFICANT CHANGE UP (ref 11.5–14.5)
SODIUM SERPL-SCNC: 139 MMOL/L — SIGNIFICANT CHANGE UP (ref 135–146)
WBC # BLD: 6.4 K/UL — SIGNIFICANT CHANGE UP (ref 4.8–10.8)
WBC # FLD AUTO: 6.4 K/UL — SIGNIFICANT CHANGE UP (ref 4.8–10.8)

## 2018-03-04 RX ADMIN — HEPARIN SODIUM 5000 UNIT(S): 5000 INJECTION INTRAVENOUS; SUBCUTANEOUS at 21:05

## 2018-03-04 RX ADMIN — PANTOPRAZOLE SODIUM 40 MILLIGRAM(S): 20 TABLET, DELAYED RELEASE ORAL at 11:54

## 2018-03-04 RX ADMIN — HEPARIN SODIUM 5000 UNIT(S): 5000 INJECTION INTRAVENOUS; SUBCUTANEOUS at 14:34

## 2018-03-04 RX ADMIN — CHLORHEXIDINE GLUCONATE 15 MILLILITER(S): 213 SOLUTION TOPICAL at 18:31

## 2018-03-04 RX ADMIN — HEPARIN SODIUM 5000 UNIT(S): 5000 INJECTION INTRAVENOUS; SUBCUTANEOUS at 06:00

## 2018-03-04 RX ADMIN — CHLORHEXIDINE GLUCONATE 15 MILLILITER(S): 213 SOLUTION TOPICAL at 06:00

## 2018-03-04 NOTE — PROGRESS NOTE ADULT - ASSESSMENT
Patient is a 27y old  Female who presents with a chief complaint of mental status change. She is been treated for encephalopathy likely secondary to Substance  abuse and overdose. Patient is S/P extubation and currently s/p trach and peg. awaiitng dispo to TBI facility for placement    1. Acute Encephalopathy likely secondary to drug overdose. mother concerns patient may have taken something while she was working at night shifts.    - Patient trached and peg (continue daily capping trial and f/u with surgery may be able to dc trach once mental status improves)  - slight improvement in mood  - patient pulled out PEG 3/2---keep patterson in place for now--continue to reassess -   - mother wants to wait on reinserting PEG only if really needs it     2. ARF secondary to drug overdose and respiratory depression   - s/p Trach   - Continue Trachea care as per unit routine    -  Pulmonary follows as needed    3. Fevers secondary to cystitis   - Urine culture positive for E.coli   - finished course of merrem 2/26  -  off antibiotics- remains afebrile- repeat cxr unremarkable   -  monitor wbc and fever curve closely- low threshold to restart abx if concern for new infection-- ID following    4. GI/DVT Prophylaxis    5. D/C plan Patient is a 27y old  Female who presents with a chief complaint of mental status change. She is been treated for encephalopathy likely secondary to Substance  abuse and overdose. Patient is S/P extubation and currently s/p trach and peg. awaiitng dispo to TBI facility for placement    1. Acute Encephalopathy likely secondary to drug overdose.    - Patient trached and peg (continue daily capping trial and f/u with surgery may be able to dc trach once mental status improves)  - slight improvement in mood  - patient pulled out PEG 3/2---keep  - Speech and swallow follow up    - mother wants to wait on reinserting PEG only if really needs it     2. ARF secondary to drug overdose and respiratory depression   - s/p Trach   - Continue Trachea care as per unit routine    -  Pulmonary follows to decide about removal of the trach    3. Fevers secondary to cystitis   - Urine culture positive for E.coli   - finished course of merrem 2/26  -  off antibiotics- remains afebrile- repeat cxr unremarkable   -  monitor wbc and fever curve closely- low threshold to restart abx if concern for new infection-- ID following    4. GI/DVT Prophylaxis    5. D/C plan

## 2018-03-04 NOTE — PROGRESS NOTE ADULT - SUBJECTIVE AND OBJECTIVE BOX
MICHELLE DRISCOLL      Subjective:    Patient is a 27y old  Female who presents with a chief complaint of mental status change. She is been treated for encephalopathy likely secondary to Substance  abuse and overdose. Patient is S/P extubation and currently s/p trach and peg. awaiitng dispo to TBI facility for placement    INTERVAL HPI/OVERNIGHT EVENTS: No acute events      REVIEW OF SYSTEMS:  Patient non verbal       T(C): 36.7 (03-04-18 @ 07:47), Max: 36.7 (03-04-18 @ 07:47)  HR: 93 (03-03-18 @ 23:54) (93 - 93)  BP: 118/76 (03-04-18 @ 07:47) (114/75 - 118/76)  RR: 18 (03-04-18 @ 07:47) (18 - 20)  SpO2: 97% (03-04-18 @ 09:09) (97% - 97%)  Wt(kg): --Vital Signs Last 24 Hrs  T(C): 36.7 (04 Mar 2018 07:47), Max: 36.7 (04 Mar 2018 07:47)  T(F): 98 (04 Mar 2018 07:47), Max: 98 (04 Mar 2018 07:47)  HR: 93 (03 Mar 2018 23:54) (93 - 93)  BP: 118/76 (04 Mar 2018 07:47) (114/75 - 118/76)  BP(mean): 100 (04 Mar 2018 07:47) (100 - 100)  RR: 18 (04 Mar 2018 07:47) (18 - 20)  SpO2: 97% (04 Mar 2018 09:09) (97% - 97%)    PHYSICAL EXAM:  GEN-awake, eyes open,nonverbal- appears comfortable (until cap put on trach)  CHEST- +trach, decreased air entry bilaterally  CVS- +s1/s2 +tachycardic  ABD- soft NT ND +bs, +PEG site       EXT-no edema    Home Medications:      MEDICATIONS  (STANDING):  chlorhexidine 0.12% Liquid 15 milliLiter(s) Swish and Spit two times a day  heparin  Injectable 5000 Unit(s) SubCutaneous every 8 hours  pantoprazole   Suspension 40 milliGRAM(s) Oral before breakfast    MEDICATIONS  (PRN):  acetaminophen    Suspension 650 milliGRAM(s) Oral every 4 hours PRN For Temp greater than 38 C (100.4 F)  lactulose Syrup 15 Gram(s) Oral two times a day PRN constipation      PAST MEDICAL & SURGICAL HISTORY:      HEALTH ISSUES - PROBLEM Dx:  Acute cystitis without hematuria: Acute cystitis without hematuria  Encephalopathy: Encephalopathy  Drug overdose, undetermined intent, initial encounter: Drug overdose, undetermined intent, initial encounter  Acute respiratory failure, unspecified whether with hypoxia or hypercapnia: Acute respiratory failure, unspecified whether with hypoxia or hypercapnia      Consultant(s) Notes Reviewed:  [x ] YES  [ ] NO    Discussed with Consultants/Other Providers [  ] YES     LABS                           12.4   6.40  )-----------( 215      ( 04 Mar 2018 07:18 )             35.5    03-04    139  |  104  |  10  ----------------------------<  103  4.5   |  27  |  0.5<L>    Ca    10.2<H>      04 Mar 2018 07:18            RADIOLOGY & ADDITIONAL TESTS:    Imaging Personally Reviewed:  [ ] YES  [ ] NO MICHELLE DRISCOLL      Subjective:    Patient is a 27y old  Female who presents with a chief complaint of mental status change. She is been treated for encephalopathy likely secondary to Substance  abuse and overdose. Patient is S/P extubation and currently s/p trach and peg. awaiitng dispo to TBI facility for placement    INTERVAL HPI/OVERNIGHT EVENTS: No acute events. Mother bedside      REVIEW OF SYSTEMS:  Patient non verbal       T(C): 36.7 (03-04-18 @ 07:47), Max: 36.7 (03-04-18 @ 07:47)  HR: 93 (03-03-18 @ 23:54) (93 - 93)  BP: 118/76 (03-04-18 @ 07:47) (114/75 - 118/76)  RR: 18 (03-04-18 @ 07:47) (18 - 20)  SpO2: 97% (03-04-18 @ 09:09) (97% - 97%)  Wt(kg): --Vital Signs Last 24 Hrs  T(C): 36.7 (04 Mar 2018 07:47), Max: 36.7 (04 Mar 2018 07:47)  T(F): 98 (04 Mar 2018 07:47), Max: 98 (04 Mar 2018 07:47)  HR: 93 (03 Mar 2018 23:54) (93 - 93)  BP: 118/76 (04 Mar 2018 07:47) (114/75 - 118/76)  BP(mean): 100 (04 Mar 2018 07:47) (100 - 100)  RR: 18 (04 Mar 2018 07:47) (18 - 20)  SpO2: 97% (04 Mar 2018 09:09) (97% - 97%)    PHYSICAL EXAM:  GEN-awake, eyes open,nonverbal- appears comfortable (until cap put on trach)  CHEST- +trach, decreased air entry bilaterally  CVS- +s1/s2 +tachycardic  ABD- soft NT ND +bs, +PEG site       EXT-no edema    Home Medications:      MEDICATIONS  (STANDING):  chlorhexidine 0.12% Liquid 15 milliLiter(s) Swish and Spit two times a day  heparin  Injectable 5000 Unit(s) SubCutaneous every 8 hours  pantoprazole   Suspension 40 milliGRAM(s) Oral before breakfast    MEDICATIONS  (PRN):  acetaminophen    Suspension 650 milliGRAM(s) Oral every 4 hours PRN For Temp greater than 38 C (100.4 F)  lactulose Syrup 15 Gram(s) Oral two times a day PRN constipation      PAST MEDICAL & SURGICAL HISTORY:      HEALTH ISSUES - PROBLEM Dx:  Acute cystitis without hematuria: Acute cystitis without hematuria  Encephalopathy: Encephalopathy  Drug overdose, undetermined intent, initial encounter: Drug overdose, undetermined intent, initial encounter  Acute respiratory failure, unspecified whether with hypoxia or hypercapnia: Acute respiratory failure, unspecified whether with hypoxia or hypercapnia      Consultant(s) Notes Reviewed:  [x ] YES  [ ] NO    Discussed with Consultants/Other Providers [  ] YES     LABS                           12.4   6.40  )-----------( 215      ( 04 Mar 2018 07:18 )             35.5    03-04    139  |  104  |  10  ----------------------------<  103  4.5   |  27  |  0.5<L>    Ca    10.2<H>      04 Mar 2018 07:18            RADIOLOGY & ADDITIONAL TESTS:    Imaging Personally Reviewed:  [x ] YES  [ ] NO

## 2018-03-05 RX ADMIN — PANTOPRAZOLE SODIUM 40 MILLIGRAM(S): 20 TABLET, DELAYED RELEASE ORAL at 05:32

## 2018-03-05 RX ADMIN — CHLORHEXIDINE GLUCONATE 15 MILLILITER(S): 213 SOLUTION TOPICAL at 18:45

## 2018-03-05 RX ADMIN — HEPARIN SODIUM 5000 UNIT(S): 5000 INJECTION INTRAVENOUS; SUBCUTANEOUS at 21:39

## 2018-03-05 RX ADMIN — HEPARIN SODIUM 5000 UNIT(S): 5000 INJECTION INTRAVENOUS; SUBCUTANEOUS at 05:33

## 2018-03-05 RX ADMIN — CHLORHEXIDINE GLUCONATE 15 MILLILITER(S): 213 SOLUTION TOPICAL at 05:33

## 2018-03-05 RX ADMIN — HEPARIN SODIUM 5000 UNIT(S): 5000 INJECTION INTRAVENOUS; SUBCUTANEOUS at 13:35

## 2018-03-05 NOTE — PROGRESS NOTE ADULT - ASSESSMENT
Assessment and Plan:   Patient is a 27y old  Female who presents with a chief complaint of mental status change. She is been treated for encephalopathy likely secondary to Substance  abuse and overdose. Patient is S/P extubation and currently status trach and status post peg. During the hospital stay, she was treated for cystitis with IV meropenem.     # Speech therapy recommended changing trach to uncuffed fenestrated size 6 for speech valve.  Now she had cuffed endotracheal tube of size 8. Hold off on trach change for now her mother will let us know by Monday.    # Neuro evaluation as per family request   - Still poor prognosis might benefit from occupational therapy.    # Dislodged PEG tube: called vascular to come and evaluate   - But family doesn't want PEG tube for now. As per mother she is tolerating feeds, will advance diet as tolerated, Aspiration Precautions. Called speech and swallow to assess her regarding her nutritional intake.    # Acute Encephalopathy likely secondary to drug overdose.    - Patient trached at this point.      # ARF secondary to drug overdose and respiratory depression   - s/p Trach   - Continue Trachea care as per unit routine    -  Pulmonary follows as needed    # Acute cystitis Resolved   Completed antibiotic course, D/joanne antibiotics as per ID    DVT ppx - sq heparin  GI ppx  DC planning Assessment and Plan:   Patient is a 27y old  Female who presents with a chief complaint of mental status change. She is been treated for encephalopathy likely secondary to Substance  abuse and overdose. Patient is S/P extubation and currently status trach and status post peg. During the hospital stay, she was treated for cystitis with IV meropenem.     # Speech therapy recommended changing trach to uncuffed fenestrated size 6 for speech valve.  Now she had cuffed endotracheal tube of size 8. Hold off on trach change for now her mother will let us know.    # Neuro evaluation as per family request   - Still poor prognosis might benefit from occupational therapy.    # Dislodged PEG tube: called vascular to come and evaluate   - But family doesn't want PEG tube for now. As per mother she is tolerating feeds, will advance diet as tolerated, Aspiration Precautions.    - Speech and swallow evaluated patient and put pt on pureed diet.     # Acute Encephalopathy likely secondary to drug overdose.    - Patient trached at this point.      # ARF secondary to drug overdose and respiratory depression   - s/p Trach   - Continue Trachea care as per unit routine    -  Pulmonary follows as needed    # Acute cystitis - Resolved   Completed antibiotic course, D/joanne antibiotics as per ID    DVT ppx - sq heparin  GI ppx  DC planning

## 2018-03-05 NOTE — PROGRESS NOTE ADULT - SUBJECTIVE AND OBJECTIVE BOX
MICHELLE DRISCOLL      Subjective:    Patient is a 27y old  Female who presents with a chief complaint of mental status change. She is been treated for encephalopathy likely secondary to Substance  abuse and overdose. Patient is S/P extubation and currently s/p trach and peg. awaiitng dispo to TBI facility for placement    INTERVAL HPI/OVERNIGHT EVENTS: No acute events. Mother bedside      REVIEW OF SYSTEMS:  Patient non verbal     Vital Signs Last 24 Hrs  T(C): 36.1 (05 Mar 2018 07:00), Max: 37.1 (04 Mar 2018 15:44)  T(F): 97 (05 Mar 2018 07:00), Max: 98.7 (04 Mar 2018 15:44)  HR: 80 (05 Mar 2018 07:00) (80 - 95)  BP: 118/77 (05 Mar 2018 07:00) (118/77 - 119/85)  BP(mean): 93 (05 Mar 2018 07:00) (93 - 98)  RR: 80 (05 Mar 2018 07:00) (20 - 95)  SpO2: 98% (04 Mar 2018 23:24) (98% - 98%)    PHYSICAL EXAM:  GEN-awake, eyes open,nonverbal- appears comfortable (until cap put on trach). more extremity movement now   CHEST- +trach, decreased air entry bilaterally  CVS- +s1/s2 +tachycardic  ABD- soft NT ND +bs, +PEG site       EXT-no edema    Home Medications:      MEDICATIONS  (STANDING):  chlorhexidine 0.12% Liquid 15 milliLiter(s) Swish and Spit two times a day  heparin  Injectable 5000 Unit(s) SubCutaneous every 8 hours  pantoprazole   Suspension 40 milliGRAM(s) Oral before breakfast    MEDICATIONS  (PRN):  acetaminophen    Suspension 650 milliGRAM(s) Oral every 4 hours PRN For Temp greater than 38 C (100.4 F)  lactulose Syrup 15 Gram(s) Oral two times a day PRN constipation      PAST MEDICAL & SURGICAL HISTORY:      HEALTH ISSUES - PROBLEM Dx:  Acute cystitis without hematuria: Acute cystitis without hematuria  Encephalopathy: Encephalopathy  Drug overdose, undetermined intent, initial encounter: Drug overdose, undetermined intent, initial encounter  Acute respiratory failure, unspecified whether with hypoxia or hypercapnia: Acute respiratory failure, unspecified whether with hypoxia or hypercapnia      Consultant(s) Notes Reviewed:  [x ] YES  [ ] NO    Discussed with Consultants/Other Providers [  ] YES     LABS                           12.4   6.40  )-----------( 215      ( 04 Mar 2018 07:18 )             35.5    03-04    139  |  104  |  10  ----------------------------<  103  4.5   |  27  |  0.5<L>    Ca    10.2<H>      04 Mar 2018 07:18            RADIOLOGY & ADDITIONAL TESTS:    Imaging Personally Reviewed:  [x ] YES  [ ] NO

## 2018-03-05 NOTE — PROGRESS NOTE ADULT - ASSESSMENT
Patient is a 27y old  Female who presents with a chief complaint of mental status change. She is been treated for encephalopathy likely secondary to Substance  abuse and overdose. Patient is S/P extubation and currently s/p trach and peg. awaiitng dispo to TBI facility for placement    1. Acute Encephalopathy likely secondary to drug overdose.    - Patient trached and peg (continue daily capping trial and f/u with surgery may be able to dc trach once mental status improves)  - slight improvement in mood  - patient pulled out PEG 3/2/18  - Speech and swallow follow up    - mother wants to wait on reinserting PEG only if really needs it     2. ARF secondary to drug overdose and respiratory depression   - s/p Trach   - Continue Trachea care as per unit routine    -  Pulmonary follows to decide about removal of the trach, Following    3. Fevers secondary to cystitis   - Urine culture positive for E.coli   - finished course of merrem 2/26  -  off antibiotics- remains afebrile- repeat cxr unremarkable   -  monitor wbc and fever curve closely- low threshold to restart abx if concern for new infection-- ID following    4. GI/DVT Prophylaxis    5. D/C plan      Need re peat PT evaluation for TBI . She should be a good candidate for TBI.

## 2018-03-05 NOTE — SWALLOW BEDSIDE ASSESSMENT ADULT - SLP GENERAL OBSERVATIONS
pt awake confused restless with periods of agitation pt awake confused restless and agitated. SLP attempted to place PMSV on trach however pt became tearful and more agitated therefore speaking valve removed

## 2018-03-05 NOTE — PROGRESS NOTE ADULT - SUBJECTIVE AND OBJECTIVE BOX
SUBJECTIVE:    Patient is a 27y old Female who presents with a chief complaint of Opioid overdose (27 Feb 2018 15:44).     Currently admitted to medicine with the primary diagnosis of Acute respiratory failure, unspecified whether with hypoxia or hypercapnia     Today is hospital day 39d. This morning she is resting comfortably in bed and no acute events overnight.     PAST MEDICAL & SURGICAL HISTORY    SOCIAL HISTORY:  Negative for smoking/alcohol/drug use.     ALLERGIES:  No Known Allergies    MEDICATIONS:  STANDING MEDICATIONS  chlorhexidine 0.12% Liquid 15 milliLiter(s) Swish and Spit two times a day  heparin  Injectable 5000 Unit(s) SubCutaneous every 8 hours  pantoprazole   Suspension 40 milliGRAM(s) Oral before breakfast    PRN MEDICATIONS  acetaminophen    Suspension 650 milliGRAM(s) Oral every 4 hours PRN  lactulose Syrup 15 Gram(s) Oral two times a day PRN    VITALS:   T(F): 97  HR: 80  BP: 118/77  RR: 80  SpO2: 98%    LABS:                        12.4   6.40  )-----------( 215      ( 04 Mar 2018 07:18 )             35.5     03-04    139  |  104  |  10  ----------------------------<  103  4.5   |  27  |  0.5<L>    Ca    10.2<H>      04 Mar 2018 07:18        PHYSICAL EXAM:  GEN: No acute distress  LUNGS: Clear to auscultation bilaterally. Trach present.   HEART: S1/S2 present. RRR.   ABD: Soft, non-tender, non-distended. Bowel sounds present  EXT: NC/NC/NE/2+PP/ORDOÑEZ  NEURO: Awake and alert. Not answering any questions. SUBJECTIVE:    Patient is a 27y old Female who presents with a chief complaint of Opioid overdose (27 Feb 2018 15:44).     Currently admitted to medicine with the primary diagnosis of Acute respiratory failure, unspecified whether with hypoxia or hypercapnia     Today is hospital day 39d. This morning she is resting comfortably in bed and no acute events overnight.     PAST MEDICAL & SURGICAL HISTORY    SOCIAL HISTORY:  Negative for smoking/alcohol/drug use.     ALLERGIES:  No Known Allergies    MEDICATIONS:  STANDING MEDICATIONS  chlorhexidine 0.12% Liquid 15 milliLiter(s) Swish and Spit two times a day  heparin  Injectable 5000 Unit(s) SubCutaneous every 8 hours  pantoprazole   Suspension 40 milliGRAM(s) Oral before breakfast    PRN MEDICATIONS  acetaminophen    Suspension 650 milliGRAM(s) Oral every 4 hours PRN  lactulose Syrup 15 Gram(s) Oral two times a day PRN    VITALS:   T(F): 97  HR: 80  BP: 118/77  RR: 80  SpO2: 98%    LABS:                        12.4   6.40  )-----------( 215      ( 04 Mar 2018 07:18 )             35.5     03-04    139  |  104  |  10  ----------------------------<  103  4.5   |  27  |  0.5<L>    Ca    10.2<H>      04 Mar 2018 07:18        PHYSICAL EXAM:  GEN: No acute distress  LUNGS: Clear to auscultation bilaterally. Trach present.   HEART: S1/S2 present. RRR.   ABD: Soft, non-tender, non-distended. Bowel sounds present  EXT: Able to move all four extremity  NEURO: Awake and alert. Not answering any questions.

## 2018-03-05 NOTE — PROGRESS NOTE ADULT - NSHPATTENDINGPLANDISCUSS_GEN_ALL_CORE
resident
 and Resident
Mother
Family and House staff
House staff
family
House staff
resident
vent staff

## 2018-03-05 NOTE — SWALLOW BEDSIDE ASSESSMENT ADULT - SWALLOW EVAL: DIAGNOSIS
no blue dye upon suction for regular and thins. no overt symptoms pt too restless and agitated for trials

## 2018-03-06 LAB
ANION GAP SERPL CALC-SCNC: 8 MMOL/L — SIGNIFICANT CHANGE UP (ref 7–14)
BUN SERPL-MCNC: 7 MG/DL — LOW (ref 10–20)
CALCIUM SERPL-MCNC: 10.8 MG/DL — HIGH (ref 8.5–10.1)
CHLORIDE SERPL-SCNC: 104 MMOL/L — SIGNIFICANT CHANGE UP (ref 98–110)
CO2 SERPL-SCNC: 28 MMOL/L — SIGNIFICANT CHANGE UP (ref 17–32)
CREAT SERPL-MCNC: 0.5 MG/DL — LOW (ref 0.7–1.5)
GLUCOSE SERPL-MCNC: 120 MG/DL — HIGH (ref 70–110)
HCT VFR BLD CALC: 36.8 % — LOW (ref 37–47)
HGB BLD-MCNC: 13 G/DL — SIGNIFICANT CHANGE UP (ref 12–16)
MCHC RBC-ENTMCNC: 31.6 PG — HIGH (ref 27–31)
MCHC RBC-ENTMCNC: 35.3 G/DL — SIGNIFICANT CHANGE UP (ref 32–37)
MCV RBC AUTO: 89.5 FL — SIGNIFICANT CHANGE UP (ref 81–99)
NRBC # BLD: 0 /100 WBCS — SIGNIFICANT CHANGE UP (ref 0–0)
PLATELET # BLD AUTO: 256 K/UL — SIGNIFICANT CHANGE UP (ref 130–400)
POTASSIUM SERPL-MCNC: 4.5 MMOL/L — SIGNIFICANT CHANGE UP (ref 3.5–5)
POTASSIUM SERPL-SCNC: 4.5 MMOL/L — SIGNIFICANT CHANGE UP (ref 3.5–5)
RBC # BLD: 4.11 M/UL — LOW (ref 4.2–5.4)
RBC # FLD: 13.2 % — SIGNIFICANT CHANGE UP (ref 11.5–14.5)
SODIUM SERPL-SCNC: 140 MMOL/L — SIGNIFICANT CHANGE UP (ref 135–146)
WBC # BLD: 5.99 K/UL — SIGNIFICANT CHANGE UP (ref 4.8–10.8)
WBC # FLD AUTO: 5.99 K/UL — SIGNIFICANT CHANGE UP (ref 4.8–10.8)

## 2018-03-06 RX ADMIN — HEPARIN SODIUM 5000 UNIT(S): 5000 INJECTION INTRAVENOUS; SUBCUTANEOUS at 06:39

## 2018-03-06 RX ADMIN — HEPARIN SODIUM 5000 UNIT(S): 5000 INJECTION INTRAVENOUS; SUBCUTANEOUS at 14:36

## 2018-03-06 RX ADMIN — HEPARIN SODIUM 5000 UNIT(S): 5000 INJECTION INTRAVENOUS; SUBCUTANEOUS at 21:30

## 2018-03-06 RX ADMIN — PANTOPRAZOLE SODIUM 40 MILLIGRAM(S): 20 TABLET, DELAYED RELEASE ORAL at 08:17

## 2018-03-06 NOTE — PROGRESS NOTE ADULT - SUBJECTIVE AND OBJECTIVE BOX
SUBJECTIVE:    Patient is a 27y old Female who presents with a chief complaint of Opioid overdose (27 Feb 2018 15:44)    Today is hospital day 40d. This morning she is resting comfortably in bed and reports no new issues or overnight events.     PAST MEDICAL & SURGICAL HISTORY    SOCIAL HISTORY:  Negative for smoking/alcohol/drug use.     ALLERGIES:  No Known Allergies    MEDICATIONS:  STANDING MEDICATIONS  chlorhexidine 0.12% Liquid 15 milliLiter(s) Swish and Spit two times a day  heparin  Injectable 5000 Unit(s) SubCutaneous every 8 hours  pantoprazole   Suspension 40 milliGRAM(s) Oral before breakfast    PRN MEDICATIONS  acetaminophen    Suspension 650 milliGRAM(s) Oral every 4 hours PRN  lactulose Syrup 15 Gram(s) Oral two times a day PRN    VITALS:   T(F): 96.1  HR: 73  BP: 127/79  RR: 18  SpO2: 96%    LABS:                        12.4   6.40  )-----------( 215      ( 04 Mar 2018 07:18 )             35.5     03-04    139  |  104  |  10  ----------------------------<  103  4.5   |  27  |  0.5<L>    Ca    10.2<H>      04 Mar 2018 07:18                    RADIOLOGY:    PHYSICAL EXAM:  GEN: No acute distress  LUNGS: Clear to auscultation bilaterally   HEART: S1/S2 present. RRR.   ABD: Soft, non-tender, non-distended. Bowel sounds present  EXT: NC/NC/NE/ORDOÑEZ  NEURO: AAOX3

## 2018-03-06 NOTE — CHART NOTE - NSCHARTNOTEFT_GEN_A_CORE
Registered Dietitian Follow-Up-- Limited Follow Up      Patient Profile Reviewed                           Yes [x]   No []     Nutrition History Previously Obtained        Yes [x]  No []       Pertinent Subjective Information: Meds and labs reviewed. Spoke to pt's family at bedside, they report pt is tolerating pureed diet really well, eating 100% po intake. They expressed concerns with liquid restriction, as they say they give pt liquids and she tolerates them fine, despite SLP reccs. SLP was present during RD visit and explained to pt's family why pt is not getting liquids at this time. Currently awaiting SLP reccs. No nutritional interventions at this time.      Pertinent Medical Interventions: Patient is S/P extubation and currently status trach and status post peg. Patient pulled out PEG on 3/2. Pt's family doesn't want PEG tube for now. As per mother she is tolerating feeds, will advance diet as tolerated    Diet order: Pureed, no liquids      Anthropometrics:  - Ht. 65"  - Wt. 132#/60kg (3/6), 68kg (2/27) vs. 68.9kg (2/25)  - BMI: 21.9 --calculated using today's wt

## 2018-03-06 NOTE — CONSULT NOTE ADULT - ASSESSMENT
IMPRESSION    Low grade fever of unknown origin x 1 day. No leukocytosis.   -Follow up BCx, UCx, Chest X ray    NOT YET DISCUSSED WITH ATTENDING. ONLY A PRELIMINARY IMPRESSION
IMPRESSION: Rehab of anoxic encephalopathy    PRECAUTIONS: [ x ] Cardiac  [  ] Respiratory  [  ] Seizures [  ] Contact Isolation  [  ] Droplet Isolation  [  ] Other    Weight Bearing Status:     RECOMMENDATION:    Out of Bed to Chair     DVT/Decubiti Prophylaxis    REHAB PLAN:     [ x  ] Bedside P/T 3-5 times a week   [x   ]   Bedside O/T  2-3 times a week             [   ] No Rehab Therapy Indicated                   [   ]  Speech Therapy   Conditioning/ROM                                    ADL  Bed Mobility                                               Conditioning/ROM  Transfers                                                     Bed Mobility  Sitting /Standing Balance                         Transfers                                        Gait Training                                               Sitting/Standing Balance  Stair Training [   ]Applicable                    Home equipment Eval                                                                        Splinting  [   ] Only      GOALS:   ADL   [   ]   Independent                    Transfers  [ x  ] Independent                          Ambulation  [x   ] Independent     [ x   ] With device                            [ x  ]  CG                                                         [   ]  CG                                                                  [   ] CG                            [    ] Min A                                                   [   ] Min A                                                              [   ] Min  A          DISCHARGE PLAN:   [xx   ]  Good candidate for Intensive Rehabilitation/Hospital based-[facility]A [facility]                                             Will tolerate 3hrs Intensive Rehab Daily                                       [    ]  Short Term Rehab in Skilled Nursing Facility                                       [    ]  Home with Outpatient or  services                                         [    ]  Possible Candidate for Intensive Hospital based Rehab
IMPRESSION    Low grade fever of unknown origin x 1 day.   No leukocytosis.   central fevers?  -Follow up BCx, UA,UCx, Chest X ray  malaria smear  no ABx

## 2018-03-06 NOTE — SWALLOW BEDSIDE ASSESSMENT ADULT - SLP GENERAL OBSERVATIONS
pt awake seated upright in bed comfortably. father and granfather at bedside. pt less restless and more attentive to clinician presence.

## 2018-03-06 NOTE — PROGRESS NOTE ADULT - ASSESSMENT
28yo F with no significant past medical history admitted for altered mental status secondary to suspected drug overdose.  The pt has undergone a prolonged hospitalization complicated by acute respiratory failure status post tracheostomy and PEG placement    Toxic/metabolic Encephalopathy secondary to drug overdose: status post tracheostomy and PEG placement.  PEG was pulled out on 3/2/18.  The pt is saturating well on room air and has been tolerating oral feeds.  Her case was discussed with speech and swallow this morning--we will recall pulmonology and surgery, and evaluate whether her tracheostomy can be removed.  Follow-up with Neuro-psychology, Dr. Macario, to evaluate for possible transfer to TBI unit/acute rehab.  Hx of Cystitis: no further fevers reported.  Status post IV Merrem  GI/DVT Prophylaxis  Discharge planning

## 2018-03-06 NOTE — CONSULT NOTE ADULT - SUBJECTIVE AND OBJECTIVE BOX
HPI: 28 yo F with Asperger's and anxiety who works as a RN in [place] and resides with family in [facility]. She was found cyanotic and barely breathing. She had a drug OD. She was found with dilaudud and morhine. She was treated with Narcan. She was hopsitalized 1/25. She has suffered an anoxic encephalopathy. Prolonged intubation; extubated, has trach colar. She is improving.       PAST MEDICAL & SURGICAL HISTORY:      Hospital Course:  She has severe cognitive impairment. She moves the right side better than the left side.   TODAY'S SUBJECTIVE & REVIEW OF SYMPTOMS:     Constitutional WNL   Cardio WNL   Resp WNL   GI WNL  Heme WNL  Endo WNL  Skin WNL  MSK WNL  Neuro WNL  Cognitive WNL  Psych WNL      MEDICATIONS  (STANDING):  chlorhexidine 0.12% Liquid 15 milliLiter(s) Swish and Spit two times a day  heparin  Injectable 5000 Unit(s) SubCutaneous every 8 hours  pantoprazole   Suspension 40 milliGRAM(s) Oral before breakfast    MEDICATIONS  (PRN):  acetaminophen    Suspension 650 milliGRAM(s) Oral every 4 hours PRN For Temp greater than 38 C (100.4 F)  lactulose Syrup 15 Gram(s) Oral two times a day PRN constipation      FAMILY HISTORY:      Allergies    No Known Allergies    Intolerances        SOCIAL HISTORY:    [  ] Etoh  [  ] Smoking  [  ] Substance abuse     Home Environment:  [  ] Home Alone  [ x ] Lives with Family-mom and dad  [  ] Home Health Aid    Dwelling:  [  ] Apartment  [  ] Private House  [  ] Adult Home  [  ] Skilled Nursing Facility      [  ] Short Term  [  ] Long Term  [  ] Stairs       Elevator [  ]    FUNCTIONAL STATUS PTA: (Check all that apply)  Ambulation: [ x  ]Independent    [  ] Dependent     [  ] Non-Ambulatory  Assistive Device: [  ] SA Cane  [  ]  Q Cane  [  ] Walker  [  ]  Wheelchair  ADL : [x  ] Independent  [  ]  Dependent       Vital Signs Last 24 Hrs  T(C): 36.1 (06 Mar 2018 14:35), Max: 37.1 (06 Mar 2018 00:10)  T(F): 97 (06 Mar 2018 14:35), Max: 98.8 (06 Mar 2018 00:10)  HR: 73 (06 Mar 2018 06:52) (73 - 94)  BP: 127/79 (06 Mar 2018 06:52) (123/77 - 130/83)  BP(mean): --  RR: 18 (06 Mar 2018 14:35) (18 - 20)  SpO2: 96% (05 Mar 2018 19:41) (96% - 96%)      PHYSICAL EXAM: Alert & Oriented Xo; she doesn't flollow simple one step commands  GENERAL: NAD, well-groomed, well-developed  HEAD:  Atraumatic, Normocephalic  EYES: EOMI, PERRLA, conjunctiva and sclera clear  NECK: Supple, No JVD, Normal thyroid  CHEST/LUNG: Clear to percussion bilaterally; No rales, rhonchi, wheezing, or rubs  HEART: Regular rate and rhythm; No murmurs, rubs, or gallops  ABDOMEN: Soft, Nontender, Nondistended; Bowel sounds present  EXTREMITIES:  2+ Peripheral Pulses, No clubbing, cyanosis, or edema    NERVOUS SYSTEM:  Cranial Nerves 2-12 intact [x  ] Abnormal  [  ]  ROM: WFL all extremities [  ]  Abnormal [  ]  Motor Strength: WFL all extremities  [  ]  Abnormal [x  ] right hemiparesis 4-/5, left side 4+/5  Sensation: intact to light touch [  ] Abnormal [  ]  Reflexes: Symmetric [  ]  Abnormal [  ]    FUNCTIONAL STATUS:  Bed Mobility: Independent [  ]  Supervision [  ]  Needs Assistance [x  ]  N/A [  ]  Transfers: Independent [  ]  Supervision [  ]  Needs Assistance [x  ]  N/A [  ]   Ambulation: Independent [  ]  Supervision [  ]  Needs Assistance [  ]  N/A [  ]  ADL: Independent [  ] Requires Assistance [  ] N/A [  ]      LABS:                        13.0   5.99  )-----------( 256      ( 06 Mar 2018 08:30 )             36.8     03-06    140  |  104  |  7<L>  ----------------------------<  120<H>  4.5   |  28  |  0.5<L>    Ca    10.8<H>      06 Mar 2018 08:30            RADIOLOGY & ADDITIONAL STUDIES:    Assesment:

## 2018-03-06 NOTE — PROGRESS NOTE ADULT - ASSESSMENT
Patient is a 27y old  Female who presents with a chief complaint of mental status change. She is been treated for encephalopathy likely secondary to Substance  abuse and overdose. Patient is S/P extubation and currently s/p trach and peg. awaiitng dispo to TBI facility for placement    1. Acute Encephalopathy likely secondary to drug overdose.    - Patient trached and peg (continue daily capping trial and f/u with surgery may be able to dc trach once mental status improves)  - slight improvement in mood  - patient pulled out PEG 3/2/18  - Speech and swallow follow up    - mother wants to wait on reinserting PEG only if really needs it     2. ARF secondary to drug overdose and respiratory depression   - s/p Trach   - Continue Trachea care as per unit routine    -  Pulmonary follows to decide about removal of the trach, Following    3. Fevers secondary to cystitis   - Urine culture positive for E.coli   - finished course of merrem 2/26  -  off antibiotics- remains afebrile- repeat cxr unremarkable   -  monitor wbc and fever curve closely- low threshold to restart abx if concern for new infection-- ID following    4. GI/DVT Prophylaxis    5. D/C plan      Need re peat PT evaluation for TBI . She should be a good candidate for TBI. Patient is a 27y old  Female who presents with a chief complaint of mental status change. She is been treated for encephalopathy likely secondary to Substance  abuse and overdose. Patient is S/P extubation and currently s/p trach and peg. awaiitng dispo to TBI facility for placement    1. Acute Encephalopathy likely secondary to drug overdose.    - Patient trached and peg (continue daily capping trial and f/u with surgery may be able to dc trach once mental status improves)  - slight improvement in mood  - patient pulled out PEG 3/2/18  - Speech and swallow follow up    - mother wants to wait on reinserting PEG only if really needs it     2. ARF secondary to drug overdose and respiratory depression   - s/p Trach   - Continue Trachea care as per unit routine    -  Pulmonary follows to decide about removal of the trach, Following    3. Fevers secondary to cystitis   - Urine culture positive for E.coli   - finished course of merrem 2/26  -  off antibiotics- remains afebrile- repeat cxr unremarkable   -  monitor wbc and fever curve closely- low threshold to restart abx if concern for new infection-- ID following    4. GI/DVT Prophylaxis    5. D/C plan      Need re peat PT evaluation for TBI . She should be a good candidate for TBI. ( [id number], Dr. Garcia Neuropsychologist to evaluate )

## 2018-03-06 NOTE — PROGRESS NOTE ADULT - SUBJECTIVE AND OBJECTIVE BOX
MICHELLE DRISCOLL [id number]    Hospitalist Note  28yo F with no significant past medical history admitted for altered mental status secondary to suspected drug overdose.  The pt has undergone a prolonged hospitalization complicated by acute respiratory failure status post tracheostomy and PEG placement    Overnight events/Updates: The pt was restarted on an oral diet which she has been tolerating without difficulty.  She remains confused but follows instructions in the presence of her parents    Vital Signs Last 24 Hrs  T(C): 36.1 (06 Mar 2018 14:35), Max: 37.1 (06 Mar 2018 00:10)  T(F): 97 (06 Mar 2018 14:35), Max: 98.8 (06 Mar 2018 00:10)  HR: 73 (06 Mar 2018 06:52) (73 - 94)  BP: 127/79 (06 Mar 2018 06:52) (123/77 - 130/83)  BP(mean): --  RR: 18 (06 Mar 2018 14:35) (18 - 20)  SpO2: 96% (05 Mar 2018 19:41) (96% - 96%)    Physical Examination:  General: AAO x 0; does not follow commands  HEENT: PERRLA, EOMI, + trach  CV= S1 & S2 appreciated  Lungs=CTA BL  Abdominal Examination= + BS  Extremity Examination= No C/C/E    ROS: No chest pain, no shortness of breath.  All other systems reviewed and are within normal limits except for the complaints in the HPI.    MEDICATIONS  (STANDING):  chlorhexidine 0.12% Liquid 15 milliLiter(s) Swish and Spit two times a day  heparin  Injectable 5000 Unit(s) SubCutaneous every 8 hours  pantoprazole   Suspension 40 milliGRAM(s) Oral before breakfast    MEDICATIONS  (PRN):  acetaminophen    Suspension 650 milliGRAM(s) Oral every 4 hours PRN For Temp greater than 38 C (100.4 F)  lactulose Syrup 15 Gram(s) Oral two times a day PRN constipation                            13.0   5.99  )-----------( 256      ( 06 Mar 2018 08:30 )             36.8     03-06    140  |  104  |  7<L>  ----------------------------<  120<H>  4.5   |  28  |  0.5<L>    Ca    10.8<H>      06 Mar 2018 08:30        Case discussed with housestaff & family  FLORECITA Sood [id number]

## 2018-03-07 RX ADMIN — HEPARIN SODIUM 5000 UNIT(S): 5000 INJECTION INTRAVENOUS; SUBCUTANEOUS at 21:11

## 2018-03-07 RX ADMIN — HEPARIN SODIUM 5000 UNIT(S): 5000 INJECTION INTRAVENOUS; SUBCUTANEOUS at 06:10

## 2018-03-07 RX ADMIN — HEPARIN SODIUM 5000 UNIT(S): 5000 INJECTION INTRAVENOUS; SUBCUTANEOUS at 15:58

## 2018-03-07 RX ADMIN — CHLORHEXIDINE GLUCONATE 15 MILLILITER(S): 213 SOLUTION TOPICAL at 18:11

## 2018-03-07 RX ADMIN — PANTOPRAZOLE SODIUM 40 MILLIGRAM(S): 20 TABLET, DELAYED RELEASE ORAL at 08:28

## 2018-03-07 NOTE — PROGRESS NOTE ADULT - ASSESSMENT
28yo F with no significant past medical history admitted for altered mental status secondary to suspected drug overdose.  The pt has undergone a prolonged hospitalization complicated by acute respiratory failure status post tracheostomy and PEG placement    Toxic/metabolic Encephalopathy secondary to drug overdose: status post tracheostomy and PEG placement.  PEG was pulled out on 3/2/18.  No acute events over the past 24 hours.  Recall trauma surgery-Dr. Shirley to evaluate for removal of tracheostomy prior to rehab transfer.  The pt cooperated with physical therapy this morning, but appeared to be very uncoordinated.  Follow-up with Neuro-psychology, Dr. Macario, for possible transfer to TBI unit/acute rehab.  Hx of Cystitis: no further fevers reported.  Status post IV Merrem  GI/DVT Prophylaxis  Discharge planning to Acute Rehab for TBI

## 2018-03-07 NOTE — OCCUPATIONAL THERAPY INITIAL EVALUATION ADULT - GENERAL OBSERVATIONS, REHAB EVAL
pt received seated In b/s chair in room with brother present in room.  in NAD left seated in b/s chair + trach, nonverbal

## 2018-03-07 NOTE — PROGRESS NOTE ADULT - SUBJECTIVE AND OBJECTIVE BOX
MICHELLE DRISCOLL [id number]    Hospitalist Note  26yo F with no significant past medical history admitted for altered mental status secondary to suspected drug overdose.  The pt has undergone a prolonged hospitalization complicated by acute respiratory failure status post tracheostomy and PEG placement    Overnight events/Updates: The pt was restarted on an oral diet which she has been tolerating without difficulty.  She remains confused but follows instructions in the presence of her parents    Vital Signs Last 24 Hrs  T(C): 36.6 (07 Mar 2018 05:49), Max: 36.6 (07 Mar 2018 05:49)  T(F): 97.9 (07 Mar 2018 05:49), Max: 97.9 (07 Mar 2018 05:49)  HR: 71 (07 Mar 2018 05:49) (71 - 92)  BP: 115/76 (07 Mar 2018 05:49) (115/76 - 122/76)  BP(mean): --  RR: 18 (07 Mar 2018 05:49) (18 - 18)  SpO2: --    Physical Examination:  General: AAO x 0; does not follow commands  HEENT: PERRLA, EOMI, + trach  CV= S1 & S2 appreciated  Lungs=CTA BL  Abdominal Examination= + BS, Soft, NT/ND  Extremity Examination= No C/C/E    ROS: No chest pain, no shortness of breath.  All other systems reviewed and are within normal limits except for the complaints in the HPI.    MEDICATIONS  (STANDING):  chlorhexidine 0.12% Liquid 15 milliLiter(s) Swish and Spit two times a day  heparin  Injectable 5000 Unit(s) SubCutaneous every 8 hours  pantoprazole   Suspension 40 milliGRAM(s) Oral before breakfast    MEDICATIONS  (PRN):  acetaminophen    Suspension 650 milliGRAM(s) Oral every 4 hours PRN For Temp greater than 38 C (100.4 F)  lactulose Syrup 15 Gram(s) Oral two times a day PRN constipation                            13.0   5.99  )-----------( 256      ( 06 Mar 2018 08:30 )             36.8     03-06    140  |  104  |  7<L>  ----------------------------<  120<H>  4.5   |  28  |  0.5<L>    Ca    10.8<H>      06 Mar 2018 08:30        Case discussed with housestaff & family  FLORECITA Sood [id number]

## 2018-03-07 NOTE — PHYSICAL THERAPY INITIAL EVALUATION ADULT - FOLLOWS COMMANDS/ANSWERS QUESTIONS, REHAB EVAL
speech unintelligible/25% of the time/Patient appears to attempt to follow direction how ever severlely uncoordinated and dysmetric movement  impedes ability to follow through appropraitelly

## 2018-03-07 NOTE — OCCUPATIONAL THERAPY INITIAL EVALUATION ADULT - PLANNED THERAPY INTERVENTIONS, OT EVAL
ADL retraining/bed mobility training/fine motor coordination training/parent/caregiver training.../transfer training/strengthening/balance training/cognitive, visual perceptual/motor coordination training/neuromuscular re-education/ROM

## 2018-03-07 NOTE — PHYSICAL THERAPY INITIAL EVALUATION ADULT - LEVEL OF INDEPENDENCE: SIT/STAND, REHAB EVAL
patient unable to  contola and main tain B hip and kne extnsion in standing position ,( fluctaution so of extension and flexion noted creating bouncing affect )/dependent (less than 25% patients effort)

## 2018-03-07 NOTE — PROGRESS NOTE ADULT - SUBJECTIVE AND OBJECTIVE BOX
SUBJECTIVE:    Patient is a 27y old Female who presents with a chief complaint of Opioid overdose (27 Feb 2018 15:44)    Today is hospital day 41d. This morning she is resting comfortably in bed and reports no new issues or overnight events.     PAST MEDICAL & SURGICAL HISTORY    SOCIAL HISTORY:  Negative for smoking/alcohol/drug use.     ALLERGIES:  No Known Allergies    MEDICATIONS:  STANDING MEDICATIONS  chlorhexidine 0.12% Liquid 15 milliLiter(s) Swish and Spit two times a day  heparin  Injectable 5000 Unit(s) SubCutaneous every 8 hours  pantoprazole   Suspension 40 milliGRAM(s) Oral before breakfast    PRN MEDICATIONS  acetaminophen    Suspension 650 milliGRAM(s) Oral every 4 hours PRN  lactulose Syrup 15 Gram(s) Oral two times a day PRN    VITALS:   T(F): 97.9  HR: 71  BP: 115/76  RR: 18  SpO2: --    LABS:                        13.0   5.99  )-----------( 256      ( 06 Mar 2018 08:30 )             36.8     03-06    140  |  104  |  7<L>  ----------------------------<  120<H>  4.5   |  28  |  0.5<L>    Ca    10.8<H>      06 Mar 2018 08:30          ABG - ( 06 Mar 2018 08:30 )  pH: NA    /  pCO2: NA    /  pO2: NA    / HCO3: NA    / Base Excess: NA    /  SaO2: NA                        RADIOLOGY:    PHYSICAL EXAM:  GEN: No acute distress  LUNGS: Clear to auscultation bilaterally   HEART: S1/S2 present. RRR.   ABD: Soft, non-tender, non-distended. Bowel sounds present  EXT: NC/NC/NE/ORDOÑEZ  NEURO: AAOX3

## 2018-03-07 NOTE — OCCUPATIONAL THERAPY INITIAL EVALUATION ADULT - RANGE OF MOTION EXAMINATION, UPPER EXTREMITY
RUE PROM WFL observed with minimal AROM at elbow and wrist, LUE ROM WFL AROM as follows: shoulder 1/2 ROM, elbow/wrist/digits WFL  all off of observation, not following commands for formal testing

## 2018-03-07 NOTE — PROGRESS NOTE ADULT - ASSESSMENT
Patient is a 27y old  Female who presents with a chief complaint of mental status change. She is been treated for encephalopathy likely secondary to Substance  abuse and overdose. Patient is S/P extubation and currently s/p trach and peg. awaiitng dispo to TBI facility for placement    1. Acute Encephalopathy likely secondary to drug overdose.    - Patient trached and peg (continue daily capping trial and f/u with surgery may be able to dc trach once mental status improves)  - slight improvement in mood  - patient pulled out PEG 3/2/18  - Speech and swallow follow up    - mother wants to wait on reinserting PEG only if really needs it     2. ARF secondary to drug overdose and respiratory depression   - s/p Trach, surgery contacted for possible removal ( [id number] )   - Continue Trachea care as per unit routine    -  Pulmonary follows to decide about removal of the trach, Following    3. Fevers secondary to cystitis   - Urine culture positive for E.coli   - finished course of merrem 2/26  -  off antibiotics- remains afebrile- repeat cxr unremarkable   -  monitor wbc and fever curve closely- low threshold to restart abx if concern for new infection-- ID following    4. GI/DVT Prophylaxis    5. D/C plan      Need re peat PT evaluation for TBI . She should be a good candidate for TBI. ( [phone], Dr. Garcia Neuropsychologist to evaluate ), PT -> Good candidate for 4a

## 2018-03-07 NOTE — PHYSICAL THERAPY INITIAL EVALUATION ADULT - PATIENT/FAMILY/SIGNIFICANT OTHER GOALS STATEMENT, PT EVAL
Patient family would like to see her progress and functionally at this point now that she has stabilized

## 2018-03-07 NOTE — PHYSICAL THERAPY INITIAL EVALUATION ADULT - PLANNED THERAPY INTERVENTIONS, PT EVAL
gait training/neuromuscular re-education/stretching/strengthening/balance training/transfer training/bed mobility training

## 2018-03-07 NOTE — PHYSICAL THERAPY INITIAL EVALUATION ADULT - GENERAL OBSERVATIONS, REHAB EVAL
11:00-12:10 40 min  Patient encountered seated in bed side chair + IV lck, + trach , brother present .

## 2018-03-07 NOTE — OCCUPATIONAL THERAPY INITIAL EVALUATION ADULT - MUSCLE TONE ASSESSMENT, REHAB EVAL
Right UE/mildly increased tone/difficult to assess as pt became resistive to movement secondary to confusion but appears to have moderately increased tone at bicep

## 2018-03-07 NOTE — PHYSICAL THERAPY INITIAL EVALUATION ADULT - IMPAIRMENTS FOUND, PT EVAL
aerobic capacity/endurance/cognitive impairment/muscle strength/poor safety awareness/ventilation and respiration/gas exchange/tone/gross motor/arousal, attention, and cognition/fine motor/reflex integrity

## 2018-03-07 NOTE — OCCUPATIONAL THERAPY INITIAL EVALUATION ADULT - NS ASR FOLLOW COMMAND OT EVAL
less than 25% of time, pt squeezed therapist hand on command and shakes head yes/no inconsistently.  appears to be attempting to follow commands

## 2018-03-07 NOTE — PHYSICAL THERAPY INITIAL EVALUATION ADULT - MANUAL MUSCLE TESTING RESULTS, REHAB EVAL
due to dysmetric movement and inability to follow commands . Patient appears to have significant strength in BUEs and LE s , how ever is unable to coordinate it into purposeful intended movement on command/grossly assessed due to

## 2018-03-07 NOTE — PHYSICAL THERAPY INITIAL EVALUATION ADULT - BALANCE DISTURBANCE, IDENTIFIED IMPAIRMENT CONTRIBUTE, REHAB EVAL
impaired coordination/abnormal muscle tone/impaired postural control/decreased strength/impaired motor control

## 2018-03-08 ENCOUNTER — TRANSCRIPTION ENCOUNTER (OUTPATIENT)
Age: 28
End: 2018-03-08

## 2018-03-08 ENCOUNTER — INPATIENT (INPATIENT)
Facility: HOSPITAL | Age: 28
LOS: 20 days | Discharge: HOME | End: 2018-03-29
Attending: PHYSICAL MEDICINE & REHABILITATION

## 2018-03-08 VITALS
RESPIRATION RATE: 19 BRPM | SYSTOLIC BLOOD PRESSURE: 123 MMHG | DIASTOLIC BLOOD PRESSURE: 79 MMHG | HEART RATE: 91 BPM | TEMPERATURE: 97 F

## 2018-03-08 RX ORDER — ACETAMINOPHEN 500 MG
650 TABLET ORAL EVERY 4 HOURS
Qty: 0 | Refills: 0 | Status: DISCONTINUED | OUTPATIENT
Start: 2018-03-08 | End: 2018-03-29

## 2018-03-08 RX ORDER — LANOLIN ALCOHOL/MO/W.PET/CERES
5 CREAM (GRAM) TOPICAL AT BEDTIME
Qty: 0 | Refills: 0 | Status: DISCONTINUED | OUTPATIENT
Start: 2018-03-08 | End: 2018-03-29

## 2018-03-08 RX ORDER — MAGNESIUM HYDROXIDE 400 MG/1
30 TABLET, CHEWABLE ORAL DAILY
Qty: 0 | Refills: 0 | Status: DISCONTINUED | OUTPATIENT
Start: 2018-03-08 | End: 2018-03-29

## 2018-03-08 RX ORDER — CHLORHEXIDINE GLUCONATE 213 G/1000ML
15 SOLUTION TOPICAL
Qty: 0 | Refills: 0 | Status: DISCONTINUED | OUTPATIENT
Start: 2018-03-08 | End: 2018-03-08

## 2018-03-08 RX ORDER — CHLORHEXIDINE GLUCONATE 213 G/1000ML
15 SOLUTION TOPICAL
Qty: 0 | Refills: 0 | COMMUNITY
Start: 2018-03-08

## 2018-03-08 RX ORDER — HEPARIN SODIUM 5000 [USP'U]/ML
5000 INJECTION INTRAVENOUS; SUBCUTANEOUS
Qty: 0 | Refills: 0 | COMMUNITY
Start: 2018-03-08

## 2018-03-08 RX ORDER — PANTOPRAZOLE SODIUM 20 MG/1
40 TABLET, DELAYED RELEASE ORAL
Qty: 0 | Refills: 0 | Status: DISCONTINUED | OUTPATIENT
Start: 2018-03-08 | End: 2018-03-26

## 2018-03-08 RX ORDER — HEPARIN SODIUM 5000 [USP'U]/ML
5000 INJECTION INTRAVENOUS; SUBCUTANEOUS EVERY 8 HOURS
Qty: 0 | Refills: 0 | Status: DISCONTINUED | OUTPATIENT
Start: 2018-03-08 | End: 2018-03-08

## 2018-03-08 RX ORDER — HEPARIN SODIUM 5000 [USP'U]/ML
5000 INJECTION INTRAVENOUS; SUBCUTANEOUS EVERY 8 HOURS
Qty: 0 | Refills: 0 | Status: DISCONTINUED | OUTPATIENT
Start: 2018-03-08 | End: 2018-03-09

## 2018-03-08 RX ADMIN — HEPARIN SODIUM 5000 UNIT(S): 5000 INJECTION INTRAVENOUS; SUBCUTANEOUS at 14:43

## 2018-03-08 RX ADMIN — CHLORHEXIDINE GLUCONATE 15 MILLILITER(S): 213 SOLUTION TOPICAL at 18:07

## 2018-03-08 RX ADMIN — HEPARIN SODIUM 5000 UNIT(S): 5000 INJECTION INTRAVENOUS; SUBCUTANEOUS at 06:26

## 2018-03-08 RX ADMIN — PANTOPRAZOLE SODIUM 40 MILLIGRAM(S): 20 TABLET, DELAYED RELEASE ORAL at 06:27

## 2018-03-08 RX ADMIN — CHLORHEXIDINE GLUCONATE 15 MILLILITER(S): 213 SOLUTION TOPICAL at 06:26

## 2018-03-08 NOTE — PROGRESS NOTE ADULT - ASSESSMENT
Patient is a 27y old  Female who is being treated for encephalopathy secondary to Substance  abuse and overdose, she is S/P extubation and currently s/p trach and had peg that was pulled on 3/2. She is currently awaitng dispo to TBI facility for placement.    1. Acute Encephalopathy likely secondary to drug overdose.    - Patient trached and surgery ( [id number] ) was called to assess for possible trach removal  - slight improvement in mood  - Speech and swallow follow up -> Regular diet, pt tolerating       3. Fevers secondary to cystitis Resolved   - Urine culture positive for E.coli   - finished course of merrem 2/26    4. GI/DVT Prophylaxis    5. D/C plan      Need re peat PT evaluation for TBI . She should be a good candidate for TBI. ( [phone], Dr. Garcia Neuropsychologist to evaluate ), PT -> Good candidate for 4a Patient is a 27y old  Female who is being treated for encephalopathy secondary to Substance  abuse and overdose, she is S/P extubation and currently s/p trach and had peg that was pulled on 3/2. She is currently awaitng dispo to TBI facility for placement.    1. Acute Encephalopathy likely secondary to drug overdose.    - Patient trached and surgery ( [id number] ) ( Dr. Shirley ) removed trach on 3/8  - slight improvement in mood  - Speech and swallow follow up -> Regular diet, pt tolerating       3. Fevers secondary to cystitis Resolved   - Urine culture positive for E.coli   - finished course of merrem 2/26    4. GI/DVT Prophylaxis    5. D/C plan      Need re peat PT evaluation for TBI . She should be a good candidate for TBI. ( [phone], Dr. Garcia Neuropsychologist to evaluate ), PT -> Good candidate for 4a

## 2018-03-08 NOTE — SWALLOW BEDSIDE ASSESSMENT ADULT - ORAL PREPARATORY PHASE
decreased spoon stripping/Anterior loss of bolus
Within functional limits
improved spoon stripping and oral opening/Anterior loss of bolus

## 2018-03-08 NOTE — SWALLOW BEDSIDE ASSESSMENT ADULT - ORAL PHASE
Delayed oral transit time/munching motion
Delayed oral transit time
Within functional limits

## 2018-03-08 NOTE — SWALLOW BEDSIDE ASSESSMENT ADULT - SWALLOW EVAL: CURRENT DIET
puree only
pleasure feeds of puree only
pleasure feeds of puree only
puree only
regular w/ thin
PEG feeds only

## 2018-03-08 NOTE — PROGRESS NOTE ADULT - PROVIDER SPECIALTY LIST ADULT
Hospitalist
Infectious Disease
Internal Medicine
Neurology
Pulmonology
Surgery
Hospitalist
Pulmonology

## 2018-03-08 NOTE — SWALLOW BEDSIDE ASSESSMENT ADULT - SWALLOW EVAL: DIAGNOSIS
+overt s/s of penetration/aspiration w/ thin, +toleration of nectar, puree and regular solids w/o any overt s/s of penetration/aspiration

## 2018-03-08 NOTE — PROGRESS NOTE ADULT - SUBJECTIVE AND OBJECTIVE BOX
SUBJECTIVE:    Patient is a 27y old Female who presents with a chief complaint of Opioid overdose (27 Feb 2018 15:44)    Today is hospital day 42d. This morning she is resting comfortably in bed and reports no new issues or overnight events.     PAST MEDICAL & SURGICAL HISTORY    SOCIAL HISTORY:  Negative for smoking/alcohol/drug use.     ALLERGIES:  No Known Allergies    MEDICATIONS:  STANDING MEDICATIONS  chlorhexidine 0.12% Liquid 15 milliLiter(s) Swish and Spit two times a day  heparin  Injectable 5000 Unit(s) SubCutaneous every 8 hours  pantoprazole   Suspension 40 milliGRAM(s) Oral before breakfast    PRN MEDICATIONS  acetaminophen    Suspension 650 milliGRAM(s) Oral every 4 hours PRN  lactulose Syrup 15 Gram(s) Oral two times a day PRN    VITALS:   T(F): 96  HR: 88  BP: 121/67  RR: 18  SpO2: --    LABS:                        13.0   5.99  )-----------( 256      ( 06 Mar 2018 08:30 )             36.8     03-06    140  |  104  |  7<L>  ----------------------------<  120<H>  4.5   |  28  |  0.5<L>    Ca    10.8<H>      06 Mar 2018 08:30          ABG - ( 06 Mar 2018 08:30 )  pH: NA    /  pCO2: NA    /  pO2: NA    / HCO3: NA    / Base Excess: NA    /  SaO2: NA                        RADIOLOGY:    PHYSICAL EXAM:  GEN: No acute distress  LUNGS: Clear to auscultation bilaterally   HEART: S1/S2 present. RRR.   ABD: Soft, non-tender, non-distended. Bowel sounds present  EXT: NC/NC/NE/ORDOÑEZ  NEURO: AAOX1

## 2018-03-08 NOTE — SWALLOW BEDSIDE ASSESSMENT ADULT - MODE OF PRESENTATION
self fed/fed by clinician/cup/spoon cup/fed by clinician/spoon fed by clinician/spoon fed by clinician

## 2018-03-08 NOTE — SWALLOW BEDSIDE ASSESSMENT ADULT - SWALLOW EVAL: RECOMMENDED FEEDING/EATING TECHNIQUES
position upright (90 degrees)/allow for swallow between intakes/small sips/bites
position upright (90 degrees)/small sips/bites
position upright (90 degrees)/small sips/bites
feed only when actively accepting po/position upright (90 degrees)/small sips/bites

## 2018-03-08 NOTE — PROGRESS NOTE ADULT - ASSESSMENT
28yo F with no significant past medical history admitted for altered mental status secondary to suspected drug overdose.  The pt has undergone a prolonged hospitalization complicated by acute respiratory failure status post tracheostomy and PEG placement    Toxic/metabolic Encephalopathy secondary to drug overdose: status post tracheostomy and PEG placement.  Status post PEG removal 3/2/18 and tracheostomy removal this morning.  No new complaints.  Awaiting for insurance authorization for transfer to 4A/TBI unit.    Hx of Cystitis: no further fevers reported.  Status post IV Merrem  GI/DVT Prophylaxis  Discharge planning to Acute Rehab for TBI

## 2018-03-08 NOTE — SWALLOW BEDSIDE ASSESSMENT ADULT - SLP GENERAL OBSERVATIONS
Pt received in bed s/p decannulation, blood noted around dressing, RN aware and provided additional dressing.

## 2018-03-08 NOTE — SWALLOW BEDSIDE ASSESSMENT ADULT - NS SPL SWALLOW CLINIC TRIAL FT
+mild oral dysphagia w/o immediate overt s/s of penetration/aspiration. No blue dye return noted when SLP suctioned trach after po trials.
Mild to moderate oral dysphagia w/o overt s/s of penetration/aspiration
+s/s of aspiration observed w/ blue dye noted in tracheal secretions post po trials
no blue dye upon suction
+overt s/s of penetration/aspiration w/ thin

## 2018-03-08 NOTE — PROGRESS NOTE ADULT - SUBJECTIVE AND OBJECTIVE BOX
MICHELLE DRISCOLL MRN-[id number]    Hospitalist Note  28yo F with no significant past medical history admitted for altered mental status secondary to suspected drug overdose.  The pt has undergone a prolonged hospitalization complicated by acute respiratory failure status post tracheostomy and PEG placement    Overnight events/Updates: her tracheostomy was removed this morning.  She has been tolerating her diet without complaint.    Vital Signs Last 24 Hrs  T(C): 35.6 (08 Mar 2018 05:05), Max: 37.1 (07 Mar 2018 22:28)  T(F): 96 (08 Mar 2018 05:05), Max: 98.7 (07 Mar 2018 22:28)  HR: 88 (08 Mar 2018 05:05) (52 - 113)  BP: 121/67 (08 Mar 2018 05:05) (121/67 - 135/89)  BP(mean): --  RR: 18 (08 Mar 2018 05:05) (18 - 18)  SpO2: --    Physical Examination:  General: AAO x 0; does not follow commands  HEENT: PERRLA, EOMI, + trach  CV= S1 & S2 appreciated  Lungs=CTA BL  Abdominal Examination= + BS, Soft, NT/ND  Extremity Examination= No C/C/E    ROS: No chest pain, no shortness of breath.  All other systems reviewed and are within normal limits except for the complaints in the HPI.    MEDICATIONS  (STANDING):  chlorhexidine 0.12% Liquid 15 milliLiter(s) Swish and Spit two times a day  heparin  Injectable 5000 Unit(s) SubCutaneous every 8 hours  pantoprazole   Suspension 40 milliGRAM(s) Oral before breakfast    MEDICATIONS  (PRN):  acetaminophen    Suspension 650 milliGRAM(s) Oral every 4 hours PRN For Temp greater than 38 C (100.4 F)  lactulose Syrup 15 Gram(s) Oral two times a day PRN constipation                Case discussed with housestaff & family  FLORECITA Sood 0136

## 2018-03-08 NOTE — SWALLOW BEDSIDE ASSESSMENT ADULT - SLP PERTINENT HISTORY OF CURRENT PROBLEM
Pt admitted s/p overdose. +Respiratory failure, dysphagia, encephalopathy, +anoxic brain injury, trach, PEG, tolerating O2 trach collar.
Pt admitted s/p overdose. +Respiratory failure, dysphagia, encephalopathy, +anoxic brain injury, trach, PEG, tolerating t-piece.
pt being treeated for anoxic brain injury. pt s/p self removal of PEG. pt with size 8 cuff mynorley trach s/p decannulation today.
pt being treeated for anoxic brain injury. pt s/p self removal of PEG. pt with size 8 cuff tong trach however tolerating room air
been treated for encephalopathy likely secondary to Substance  abuse and overdose. Patient is S/P extubation and currently s/p trach and peg. pt s/p self removal of PEG. pt has been on puree only however family giving water with reportedly no difficulty
Pt admitted s/p overdose. +Respiratory failure, dysphagia, encephalopathy, +anoxic brain injury, trach, PEG, tolerating t-piece.

## 2018-03-08 NOTE — SWALLOW BEDSIDE ASSESSMENT ADULT - ASR SWALLOW ASPIRATION MONITOR
change of breathing pattern/gurgly voice/cough/throat clearing
throat clearing/change of breathing pattern/cough
cough/gurgly voice/pneumonia/throat clearing/position upright (90Y)/fever
fever/throat clearing/position upright (90Y)/gurgly voice/cough/pneumonia
pneumonia/gurgly voice/position upright (90Y)/cough/fever
pneumonia/gurgly voice/position upright (90Y)/fever

## 2018-03-08 NOTE — SWALLOW BEDSIDE ASSESSMENT ADULT - POSITIONING
upright (90 degrees)

## 2018-03-09 RX ORDER — HEPARIN SODIUM 5000 [USP'U]/ML
5000 INJECTION INTRAVENOUS; SUBCUTANEOUS EVERY 8 HOURS
Qty: 0 | Refills: 0 | Status: DISCONTINUED | OUTPATIENT
Start: 2018-03-09 | End: 2018-03-09

## 2018-03-09 RX ORDER — ENOXAPARIN SODIUM 100 MG/ML
40 INJECTION SUBCUTANEOUS DAILY
Qty: 0 | Refills: 0 | Status: DISCONTINUED | OUTPATIENT
Start: 2018-03-09 | End: 2018-03-19

## 2018-03-09 RX ADMIN — HEPARIN SODIUM 5000 UNIT(S): 5000 INJECTION INTRAVENOUS; SUBCUTANEOUS at 06:41

## 2018-03-09 RX ADMIN — PANTOPRAZOLE SODIUM 40 MILLIGRAM(S): 20 TABLET, DELAYED RELEASE ORAL at 06:41

## 2018-03-09 NOTE — DISCHARGE NOTE ADULT - CARE PLAN
Goal:	optimize ADLs and functional level  Assessment and plan of treatment:	During stay on acute inpatient rehab, patient made as much progress as had been anticipated and was cleared for discharge by a multiple disciplinary team.  Goal:	anoxic encephalopathy  Assessment and plan of treatment:	optimize ADLs,. mobility, and endurance

## 2018-03-09 NOTE — DISCHARGE NOTE ADULT - HOSPITAL COURSE
HPI: 26yo female with no significant past medical history admitted for altered mental status secondary to suspected substance overdose. Intubated for hypercapnea. Prolonged hospitalization for toxic/metabolic encephalopathy complicated by acute respiratory failure status post tracheostomy and PEG placement on 2/5/18. Course complicated by E.coli UTI, completed course of meropenem. Prior to rehab, PEG was pulled out on 3/2/18. Tracheostomy decannulated by Dr. Shirley on 3/8/18. Adm to inpt rehab 3/9; severlely uncoordinated and dysmetric mov'ts, speech unintelligible a majority of the time. Prior to rehab, 1/26/18 Echo = LVEF 48%, diffuse hypokinesis. 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 No ICH. Prior to rehab, 3/8: Bedside swallow - recommended regular consistency diet with nectar thick liquids. On rehab, 3/13: Started amantadine 100mg BID for cognition, however, on rehab 3/26: stopped amantidine due to restlessness, c/o insomnia also likely due to stimulating environment.     Prior functional status: indep with ambulation and ADLs    Admission Physical Exam:  Vital signs stable. Afebrile  Gen: alert, NAD  Cardio: RRR  Lungs: clear  Abd: soft, NT  Extremities: without edema. PROM wnl.  Neuro: alert, smiles, non verbal, no vocalizations, good eye contact, + rocking, unable to follow any commands even with visual cues but able to feed self with min assist when spoken to and when food is placed near her head   Cranial nerves: grossly intact  Motor Power: difficult to assess, but using left side functionally against gravity; tone wnl; R side with moderate increased tone with minimal Active ROM  Sensation: unable    Hospital Course: The patient was admitted to the acute inpatient rehab unit presenting with a decline in functional status. The patient participated in three hours of multidisciplinary therapy 5-6 days per week. The patient was continued on all home medications or equivalent alternatives as deemed appropriate. The patient received prophylactic anticoagulation medication and was monitored closely with no complications. During the stay on the inpatient unit, the patient showed as much progress as had been anticipated and was cleared for discharge by a multidisciplinary team.    Discharge functional status: bed mobility and transfers: supervision; ambulation without assistive devices 150feet with contact guard assist    Discharge disposition: home with home care services

## 2018-03-09 NOTE — DISCHARGE NOTE ADULT - PLAN OF CARE
optimize ADLs and functional level During stay on acute inpatient rehab, patient made as much progress as had been anticipated and was cleared for discharge by a multiple disciplinary team. anoxic encephalopathy optimize ADLs,. mobility, and endurance

## 2018-03-09 NOTE — DISCHARGE NOTE ADULT - ADDITIONAL INSTRUCTIONS
Please follow up with listed outpatient providers. Please continue taking medications as directed. Please seek immediate medical attention at your nearest Emergency Department, if you should develop new or persistent symptoms including but not limited to pain, fever, chills, worsened difficulty speaking, worsened difficulty swallowing, worsened confusion.

## 2018-03-09 NOTE — DISCHARGE NOTE ADULT - PATIENT PORTAL LINK FT
You can access the [facility] Patient Portal, offered by [facility], by registering with the following website: [email or web address]

## 2018-03-09 NOTE — DISCHARGE NOTE ADULT - MEDICATION SUMMARY - MEDICATIONS TO STOP TAKING
I will STOP taking the medications listed below when I get home from the hospital:    melatonin 5 mg oral tablet  -- 1 tab(s) by mouth once a day (at bedtime), As Needed for insomnia

## 2018-03-10 RX ADMIN — PANTOPRAZOLE SODIUM 40 MILLIGRAM(S): 20 TABLET, DELAYED RELEASE ORAL at 08:49

## 2018-03-10 RX ADMIN — ENOXAPARIN SODIUM 40 MILLIGRAM(S): 100 INJECTION SUBCUTANEOUS at 12:26

## 2018-03-11 RX ADMIN — ENOXAPARIN SODIUM 40 MILLIGRAM(S): 100 INJECTION SUBCUTANEOUS at 17:55

## 2018-03-11 RX ADMIN — PANTOPRAZOLE SODIUM 40 MILLIGRAM(S): 20 TABLET, DELAYED RELEASE ORAL at 07:00

## 2018-03-12 DIAGNOSIS — N39.0 URINARY TRACT INFECTION, SITE NOT SPECIFIED: ICD-10-CM

## 2018-03-12 DIAGNOSIS — J96.02 ACUTE RESPIRATORY FAILURE WITH HYPERCAPNIA: ICD-10-CM

## 2018-03-12 DIAGNOSIS — E83.52 HYPERCALCEMIA: ICD-10-CM

## 2018-03-12 DIAGNOSIS — Z43.1 ENCOUNTER FOR ATTENTION TO GASTROSTOMY: ICD-10-CM
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
## 2018-03-12 DIAGNOSIS — F84.5 ASPERGER'S SYNDROME: ICD-10-CM

## 2018-03-12 DIAGNOSIS — E87.4 MIXED DISORDER OF ACID-BASE BALANCE: ICD-10-CM

## 2018-03-12 DIAGNOSIS — F32.9 MAJOR DEPRESSIVE DISORDER, SINGLE EPISODE, UNSPECIFIED: ICD-10-CM

## 2018-03-12 DIAGNOSIS — G92 TOXIC ENCEPHALOPATHY: ICD-10-CM

## 2018-03-12 DIAGNOSIS — I21.4 NON-ST ELEVATION (NSTEMI) MYOCARDIAL INFARCTION: ICD-10-CM

## 2018-03-12 DIAGNOSIS — R68.0 HYPOTHERMIA, NOT ASSOCIATED WITH LOW ENVIRONMENTAL TEMPERATURE: ICD-10-CM

## 2018-03-12 DIAGNOSIS — A41.9 SEPSIS, UNSPECIFIED ORGANISM: ICD-10-CM

## 2018-03-12 DIAGNOSIS — G93.6 CEREBRAL EDEMA: ICD-10-CM

## 2018-03-12 DIAGNOSIS — R57.1 HYPOVOLEMIC SHOCK: ICD-10-CM

## 2018-03-12 DIAGNOSIS — B96.29 OTHER ESCHERICHIA COLI [E. COLI] AS THE CAUSE OF DISEASES CLASSIFIED ELSEWHERE: ICD-10-CM

## 2018-03-12 DIAGNOSIS — T40.2X1A POISONING BY OTHER OPIOIDS, ACCIDENTAL (UNINTENTIONAL), INITIAL ENCOUNTER: ICD-10-CM

## 2018-03-12 DIAGNOSIS — F41.9 ANXIETY DISORDER, UNSPECIFIED: ICD-10-CM

## 2018-03-12 DIAGNOSIS — R13.10 DYSPHAGIA, UNSPECIFIED: ICD-10-CM

## 2018-03-12 DIAGNOSIS — R40.2431 GLASGOW COMA SCALE SCORE 3-8, IN THE FIELD [EMT OR AMBULANCE]: ICD-10-CM

## 2018-03-12 DIAGNOSIS — N17.9 ACUTE KIDNEY FAILURE, UNSPECIFIED: ICD-10-CM

## 2018-03-12 DIAGNOSIS — Z16.12 EXTENDED SPECTRUM BETA LACTAMASE (ESBL) RESISTANCE: ICD-10-CM

## 2018-03-12 LAB — MAGNESIUM SERPL-MCNC: 1.8 MG/DL — SIGNIFICANT CHANGE UP (ref 1.8–2.4)

## 2018-03-12 RX ORDER — AMANTADINE HCL 100 MG
100 CAPSULE ORAL EVERY 12 HOURS
Qty: 0 | Refills: 0 | Status: DISCONTINUED | OUTPATIENT
Start: 2018-03-12 | End: 2018-03-26

## 2018-03-12 RX ADMIN — Medication 100 MILLIGRAM(S): at 18:45

## 2018-03-12 RX ADMIN — PANTOPRAZOLE SODIUM 40 MILLIGRAM(S): 20 TABLET, DELAYED RELEASE ORAL at 07:59

## 2018-03-12 RX ADMIN — ENOXAPARIN SODIUM 40 MILLIGRAM(S): 100 INJECTION SUBCUTANEOUS at 12:52

## 2018-03-13 RX ADMIN — ENOXAPARIN SODIUM 40 MILLIGRAM(S): 100 INJECTION SUBCUTANEOUS at 12:56

## 2018-03-13 RX ADMIN — PANTOPRAZOLE SODIUM 40 MILLIGRAM(S): 20 TABLET, DELAYED RELEASE ORAL at 06:44

## 2018-03-13 RX ADMIN — Medication 100 MILLIGRAM(S): at 18:18

## 2018-03-14 RX ADMIN — ENOXAPARIN SODIUM 40 MILLIGRAM(S): 100 INJECTION SUBCUTANEOUS at 14:51

## 2018-03-14 RX ADMIN — Medication 100 MILLIGRAM(S): at 06:50

## 2018-03-14 RX ADMIN — Medication 100 MILLIGRAM(S): at 18:52

## 2018-03-14 RX ADMIN — PANTOPRAZOLE SODIUM 40 MILLIGRAM(S): 20 TABLET, DELAYED RELEASE ORAL at 06:50

## 2018-03-15 RX ADMIN — ENOXAPARIN SODIUM 40 MILLIGRAM(S): 100 INJECTION SUBCUTANEOUS at 14:11

## 2018-03-15 RX ADMIN — Medication 100 MILLIGRAM(S): at 18:22

## 2018-03-15 RX ADMIN — PANTOPRAZOLE SODIUM 40 MILLIGRAM(S): 20 TABLET, DELAYED RELEASE ORAL at 06:45

## 2018-03-15 RX ADMIN — Medication 100 MILLIGRAM(S): at 06:45

## 2018-03-16 RX ADMIN — Medication 100 MILLIGRAM(S): at 06:46

## 2018-03-16 RX ADMIN — ENOXAPARIN SODIUM 40 MILLIGRAM(S): 100 INJECTION SUBCUTANEOUS at 11:43

## 2018-03-16 RX ADMIN — Medication 100 MILLIGRAM(S): at 19:06

## 2018-03-16 RX ADMIN — PANTOPRAZOLE SODIUM 40 MILLIGRAM(S): 20 TABLET, DELAYED RELEASE ORAL at 06:43

## 2018-03-17 RX ADMIN — ENOXAPARIN SODIUM 40 MILLIGRAM(S): 100 INJECTION SUBCUTANEOUS at 14:05

## 2018-03-17 RX ADMIN — PANTOPRAZOLE SODIUM 40 MILLIGRAM(S): 20 TABLET, DELAYED RELEASE ORAL at 06:53

## 2018-03-17 RX ADMIN — Medication 100 MILLIGRAM(S): at 18:21

## 2018-03-17 RX ADMIN — Medication 100 MILLIGRAM(S): at 06:53

## 2018-03-18 RX ADMIN — ENOXAPARIN SODIUM 40 MILLIGRAM(S): 100 INJECTION SUBCUTANEOUS at 12:33

## 2018-03-18 RX ADMIN — Medication 100 MILLIGRAM(S): at 06:56

## 2018-03-18 RX ADMIN — PANTOPRAZOLE SODIUM 40 MILLIGRAM(S): 20 TABLET, DELAYED RELEASE ORAL at 06:56

## 2018-03-18 RX ADMIN — Medication 100 MILLIGRAM(S): at 18:29

## 2018-03-19 LAB — MAGNESIUM SERPL-MCNC: 1.8 MG/DL — SIGNIFICANT CHANGE UP (ref 1.8–2.4)

## 2018-03-19 RX ORDER — ENOXAPARIN SODIUM 100 MG/ML
40 INJECTION SUBCUTANEOUS DAILY
Qty: 0 | Refills: 0 | Status: DISCONTINUED | OUTPATIENT
Start: 2018-03-19 | End: 2018-03-26

## 2018-03-19 RX ADMIN — Medication 100 MILLIGRAM(S): at 21:42

## 2018-03-19 RX ADMIN — Medication 100 MILLIGRAM(S): at 06:50

## 2018-03-19 RX ADMIN — PANTOPRAZOLE SODIUM 40 MILLIGRAM(S): 20 TABLET, DELAYED RELEASE ORAL at 06:50

## 2018-03-19 RX ADMIN — ENOXAPARIN SODIUM 40 MILLIGRAM(S): 100 INJECTION SUBCUTANEOUS at 14:53

## 2018-03-20 RX ADMIN — Medication 100 MILLIGRAM(S): at 06:47

## 2018-03-20 RX ADMIN — PANTOPRAZOLE SODIUM 40 MILLIGRAM(S): 20 TABLET, DELAYED RELEASE ORAL at 06:47

## 2018-03-20 RX ADMIN — ENOXAPARIN SODIUM 40 MILLIGRAM(S): 100 INJECTION SUBCUTANEOUS at 12:19

## 2018-03-21 RX ADMIN — Medication 100 MILLIGRAM(S): at 20:34

## 2018-03-21 RX ADMIN — ENOXAPARIN SODIUM 40 MILLIGRAM(S): 100 INJECTION SUBCUTANEOUS at 12:44

## 2018-03-22 RX ADMIN — Medication 100 MILLIGRAM(S): at 08:24

## 2018-03-22 RX ADMIN — PANTOPRAZOLE SODIUM 40 MILLIGRAM(S): 20 TABLET, DELAYED RELEASE ORAL at 08:24

## 2018-03-22 RX ADMIN — Medication 100 MILLIGRAM(S): at 17:25

## 2018-03-22 RX ADMIN — ENOXAPARIN SODIUM 40 MILLIGRAM(S): 100 INJECTION SUBCUTANEOUS at 14:49

## 2018-03-23 RX ADMIN — Medication 100 MILLIGRAM(S): at 17:27

## 2018-03-23 RX ADMIN — Medication 100 MILLIGRAM(S): at 06:33

## 2018-03-23 RX ADMIN — ENOXAPARIN SODIUM 40 MILLIGRAM(S): 100 INJECTION SUBCUTANEOUS at 12:07

## 2018-03-23 RX ADMIN — PANTOPRAZOLE SODIUM 40 MILLIGRAM(S): 20 TABLET, DELAYED RELEASE ORAL at 06:33

## 2018-03-24 RX ADMIN — Medication 100 MILLIGRAM(S): at 17:14

## 2018-03-24 RX ADMIN — ENOXAPARIN SODIUM 40 MILLIGRAM(S): 100 INJECTION SUBCUTANEOUS at 12:23

## 2018-03-24 RX ADMIN — Medication 100 MILLIGRAM(S): at 06:32

## 2018-03-24 RX ADMIN — PANTOPRAZOLE SODIUM 40 MILLIGRAM(S): 20 TABLET, DELAYED RELEASE ORAL at 05:51

## 2018-03-25 RX ADMIN — Medication 100 MILLIGRAM(S): at 07:25

## 2018-03-25 RX ADMIN — PANTOPRAZOLE SODIUM 40 MILLIGRAM(S): 20 TABLET, DELAYED RELEASE ORAL at 07:26

## 2018-03-25 RX ADMIN — Medication 100 MILLIGRAM(S): at 18:40

## 2018-03-25 RX ADMIN — ENOXAPARIN SODIUM 40 MILLIGRAM(S): 100 INJECTION SUBCUTANEOUS at 11:58

## 2018-03-26 LAB — MAGNESIUM SERPL-MCNC: 1.8 MG/DL — SIGNIFICANT CHANGE UP (ref 1.8–2.4)

## 2018-03-26 RX ADMIN — Medication 100 MILLIGRAM(S): at 17:12

## 2018-03-26 RX ADMIN — PANTOPRAZOLE SODIUM 40 MILLIGRAM(S): 20 TABLET, DELAYED RELEASE ORAL at 06:53

## 2018-03-26 RX ADMIN — Medication 100 MILLIGRAM(S): at 06:54

## 2018-03-26 RX ADMIN — ENOXAPARIN SODIUM 40 MILLIGRAM(S): 100 INJECTION SUBCUTANEOUS at 12:05

## 2018-03-29 RX ORDER — LANOLIN ALCOHOL/MO/W.PET/CERES
1 CREAM (GRAM) TOPICAL
Qty: 0 | Refills: 0 | COMMUNITY
Start: 2018-03-29

## 2018-04-05 DIAGNOSIS — Z93.1 GASTROSTOMY STATUS: ICD-10-CM

## 2018-04-05 DIAGNOSIS — Z93.0 TRACHEOSTOMY STATUS: ICD-10-CM

## 2018-04-05 DIAGNOSIS — F84.5 ASPERGER'S SYNDROME: ICD-10-CM

## 2018-04-05 DIAGNOSIS — F41.9 ANXIETY DISORDER, UNSPECIFIED: ICD-10-CM

## 2018-04-05 DIAGNOSIS — G93.1 ANOXIC BRAIN DAMAGE, NOT ELSEWHERE CLASSIFIED: ICD-10-CM

## 2018-04-05 DIAGNOSIS — R47.01 APHASIA: ICD-10-CM

## 2018-04-05 DIAGNOSIS — R48.2 APRAXIA: ICD-10-CM

## 2018-06-25 ENCOUNTER — OUTPATIENT (OUTPATIENT)
Dept: OUTPATIENT SERVICES | Facility: HOSPITAL | Age: 28
LOS: 1 days | Discharge: HOME | End: 2018-06-25

## 2018-06-26 ENCOUNTER — OUTPATIENT (OUTPATIENT)
Dept: OUTPATIENT SERVICES | Facility: HOSPITAL | Age: 28
LOS: 1 days | Discharge: HOME | End: 2018-06-26

## 2018-06-26 DIAGNOSIS — F02.80 DEMENTIA IN OTHER DISEASES CLASSIFIED ELSEWHERE, UNSPECIFIED SEVERITY, WITHOUT BEHAVIORAL DISTURBANCE, PSYCHOTIC DISTURBANCE, MOOD DISTURBANCE, AND ANXIETY: ICD-10-CM

## 2018-06-26 DIAGNOSIS — G93.40 ENCEPHALOPATHY, UNSPECIFIED: ICD-10-CM

## 2018-06-26 DIAGNOSIS — F32.0 MAJOR DEPRESSIVE DISORDER, SINGLE EPISODE, MILD: ICD-10-CM

## 2018-06-26 DIAGNOSIS — F41.1 GENERALIZED ANXIETY DISORDER: ICD-10-CM

## 2018-06-26 DIAGNOSIS — G31.84 MILD COGNITIVE IMPAIRMENT OF UNCERTAIN OR UNKNOWN ETIOLOGY: ICD-10-CM

## 2018-06-26 DIAGNOSIS — F41.8 OTHER SPECIFIED ANXIETY DISORDERS: ICD-10-CM

## 2018-06-26 DIAGNOSIS — S06.2X9S DIFFUSE TRAUMATIC BRAIN INJURY WITH LOSS OF CONSCIOUSNESS OF UNSPECIFIED DURATION, SEQUELA: ICD-10-CM

## 2018-07-05 DIAGNOSIS — H52.13 MYOPIA, BILATERAL: ICD-10-CM

## 2018-07-05 DIAGNOSIS — H52.223 REGULAR ASTIGMATISM, BILATERAL: ICD-10-CM

## 2018-07-05 DIAGNOSIS — H52.529 PARESIS OF ACCOMMODATION, UNSPECIFIED EYE: ICD-10-CM

## 2018-07-05 DIAGNOSIS — H51.11 CONVERGENCE INSUFFICIENCY: ICD-10-CM

## 2018-08-01 ENCOUNTER — OUTPATIENT (OUTPATIENT)
Dept: OUTPATIENT SERVICES | Facility: HOSPITAL | Age: 28
LOS: 1 days | Discharge: HOME | End: 2018-08-01

## 2018-08-01 DIAGNOSIS — I69.811 MEMORY DEFICIT FOLLOWING OTHER CEREBROVASCULAR DISEASE: ICD-10-CM

## 2018-08-01 DIAGNOSIS — I69.820 APHASIA FOLLOWING OTHER CEREBROVASCULAR DISEASE: ICD-10-CM

## 2019-07-01 ENCOUNTER — FORM ENCOUNTER (OUTPATIENT)
Age: 29
End: 2019-07-01

## 2019-08-26 ENCOUNTER — OUTPATIENT (OUTPATIENT)
Dept: OUTPATIENT SERVICES | Facility: HOSPITAL | Age: 29
LOS: 1 days | Discharge: HOME | End: 2019-08-26

## 2019-08-26 DIAGNOSIS — G93.1 ANOXIC BRAIN DAMAGE, NOT ELSEWHERE CLASSIFIED: ICD-10-CM

## 2019-08-26 DIAGNOSIS — G31.84 MILD COGNITIVE IMPAIRMENT OF UNCERTAIN OR UNKNOWN ETIOLOGY: ICD-10-CM

## 2019-08-26 DIAGNOSIS — F40.10 SOCIAL PHOBIA, UNSPECIFIED: ICD-10-CM

## 2020-07-09 ENCOUNTER — TRANSCRIPTION ENCOUNTER (OUTPATIENT)
Age: 30
End: 2020-07-09

## 2022-08-10 ENCOUNTER — OUTPATIENT (OUTPATIENT)
Dept: OUTPATIENT SERVICES | Facility: HOSPITAL | Age: 32
LOS: 1 days | Discharge: HOME | End: 2022-08-10

## 2022-08-10 DIAGNOSIS — N97.9 FEMALE INFERTILITY, UNSPECIFIED: ICD-10-CM

## 2022-08-10 PROCEDURE — 74740 X-RAY FEMALE GENITAL TRACT: CPT | Mod: 26

## 2022-08-10 PROCEDURE — 58340 CATHETER FOR HYSTEROGRAPHY: CPT

## 2022-08-30 NOTE — PROGRESS NOTE ADULT - MUSCULOSKELETAL
Additional Notes: Can use hydrocortisone to help healing after tx.\\nUsed HC w/o resoln. Thought initially rxn was from eyebrow dye.
Render Risk Assessment In Note?: no
Detail Level: Simple
negative
negative

## 2023-09-03 ENCOUNTER — NON-APPOINTMENT (OUTPATIENT)
Age: 33
End: 2023-09-03

## 2024-08-20 ENCOUNTER — NON-APPOINTMENT (OUTPATIENT)
Age: 34
End: 2024-08-20

## 2024-09-16 ENCOUNTER — APPOINTMENT (OUTPATIENT)
Dept: OBGYN | Facility: CLINIC | Age: 34
End: 2024-09-16
Payer: COMMERCIAL

## 2024-09-16 VITALS — WEIGHT: 172 LBS | BODY MASS INDEX: 27.64 KG/M2 | HEIGHT: 66 IN | TEMPERATURE: 97.2 F

## 2024-09-16 DIAGNOSIS — N91.1 SECONDARY AMENORRHEA: ICD-10-CM

## 2024-09-16 LAB
APPEARANCE: CLEAR
BILIRUBIN URINE: ABNORMAL
BLOOD URINE: NEGATIVE
COLOR: YELLOW
GLUCOSE QUALITATIVE U: NEGATIVE
KETONES URINE: 15
LEUKOCYTE ESTERASE URINE: ABNORMAL
NITRITE URINE: NEGATIVE
PH URINE: 6.5
PROTEIN URINE: NEGATIVE
SPECIFIC GRAVITY URINE: 1.02
UROBILINOGEN URINE: 1 (ref 0.2–?)

## 2024-09-16 PROCEDURE — 99204 OFFICE O/P NEW MOD 45 MIN: CPT

## 2024-09-22 PROBLEM — N91.1 AMENORRHEA, SECONDARY: Status: ACTIVE | Noted: 2024-09-22

## 2024-09-22 NOTE — DISCUSSION/SUMMARY
[FreeTextEntry1] : 34 YEAR OLD FEMALE G 1 - 0 AB 1- "CHEMICAL PREGNANCY" WITH SPONT. MISC. LMP 4/11/2024 PRESENTS WITH ESTABLISHED IUI PREGNANCY- TRIPLETS WITH SPONTANEOUS REDUCTION TO TWINS, FOR VISIT FOR ESTABLISHING CONTINUANCE OF PRENATAL CARE. GENETIC TESTING AS PER PATIENT WAS UNABLE TO BE DONE DUE TO MULTIPLE GESTATION AND PT WAS TOLD INCREASED REISK OF DOWNS SHYNDROME. PT HAS BEEN SEEING DAIJA BEASLEY  AT [facility]. NO SUGAR TESTING DONE YET . PT ADVISED THAT EDC BY 1ST TRIMESTER  US WAS 1/13/2025.  LAST SEEN APPOXIMATELY 2 WKS AGO.  PT SCHEDULED FOR FETAL ECHO 9/18/2024. PRENATAL BLOOD WORK DONE .  SAW DR. NAYLOR MOST RECENTLY 2 WKS AGO. MEDICAL HISTORY; HISTORY OF ANEMIA SURGICAL HISTORY; LAPAROSCOPY AND PT DIAGNOSED WTIH MILD ENDOMETIOSIS 2/2024 BY                            DR. SON.  MEDICATIONS; PRENATAL VITAMINS                            ASA 81 MG BID                            FE MEDICATION ALLERGIES; NONE ROS; BMS-NORMAL; NO FAM HISTORY OF COLON CANCER           NO URINARY COMPLAINTS           SAW ENDOCRINOLOGIST  OFF PRENATAL VITAMINS AND TESTED AND WAS OK BREASTS; NO BASELINE MAMMOGRPAHY DONE YET                    NO FAM HISTORY OF BREAST CANCER FAM HISTORY: MOTHER AND MATERNAL GRANDMOTHER WITH HYPERTENSION AND DIABETES                            FATHER WITH HYPERTENSION                             MATE GREAT GRANDFATHER WTIH PROSTATE CANCER  PE;/70; TEMP 97.2; WEIGHT 172 LB  HEIGHT 5'6"       HEENT; WNL       NECK ;SUPPLE, THYROID NORMAL       CHEST SYMMETRICAL       LUNGS; CLEAR TO A ND P       HEART; REGUALR RHYTHM NO MURMURS       ABODMEN;APPROXIMATELY 25 WKS GESTATIONAL SIZE;  FHR [id number] BP AND LLQ 140BPM       PELVIC DEFERRED AT THIS TIME  IMP; SECONDARY AMENORRHEA          TWIN GESTATION          IUI PREGNANCY  PLAN; RECORDS FROM PREVIOUS OBS, DR. CARDOZO             SONOGRAM WITH DR. NAYLOR AND FETAL ECHO              ORDERING 1 HR GLUCOSE TESTING, CBC, TSH REE T4, FERRITIN               RETURN TO OFFICE 2- 3 WKS OR PRN

## 2024-09-27 ENCOUNTER — LABORATORY RESULT (OUTPATIENT)
Age: 34
End: 2024-09-27

## 2024-10-07 ENCOUNTER — APPOINTMENT (OUTPATIENT)
Dept: OBGYN | Facility: CLINIC | Age: 34
End: 2024-10-07
Payer: COMMERCIAL

## 2024-10-07 VITALS — HEIGHT: 66 IN | TEMPERATURE: 98.3 F | BODY MASS INDEX: 28.77 KG/M2 | WEIGHT: 179 LBS

## 2024-10-07 LAB
APPEARANCE: CLEAR
BILIRUBIN URINE: NEGATIVE
BLOOD URINE: NEGATIVE
COLOR: YELLOW
GLUCOSE QUALITATIVE U: NEGATIVE
KETONES URINE: NEGATIVE
LEUKOCYTE ESTERASE URINE: ABNORMAL
NITRITE URINE: NEGATIVE
PH URINE: 7
PROTEIN URINE: NEGATIVE
SPECIFIC GRAVITY URINE: 1.02
UROBILINOGEN URINE: 0.2 (ref 0.2–?)

## 2024-10-07 PROCEDURE — 0500F INITIAL PRENATAL CARE VISIT: CPT

## 2024-10-25 ENCOUNTER — APPOINTMENT (OUTPATIENT)
Dept: OBGYN | Facility: CLINIC | Age: 34
End: 2024-10-25
Payer: COMMERCIAL

## 2024-10-25 VITALS
WEIGHT: 188 LBS | DIASTOLIC BLOOD PRESSURE: 83 MMHG | HEIGHT: 66 IN | SYSTOLIC BLOOD PRESSURE: 130 MMHG | BODY MASS INDEX: 30.22 KG/M2

## 2024-10-25 DIAGNOSIS — Z34.90 ENCOUNTER FOR SUPERVISION OF NORMAL PREGNANCY, UNSPECIFIED, UNSPECIFIED TRIMESTER: ICD-10-CM

## 2024-10-25 LAB
APPEARANCE: CLEAR
BILIRUBIN URINE: NEGATIVE
BLOOD URINE: NEGATIVE
COLOR: YELLOW
GLUCOSE QUALITATIVE U: NEGATIVE
KETONES URINE: NEGATIVE
LEUKOCYTE ESTERASE URINE: ABNORMAL
NITRITE URINE: NEGATIVE
PH URINE: 6.5
PROTEIN URINE: NEGATIVE
SPECIFIC GRAVITY URINE: 1.02
UROBILINOGEN URINE: 1 (ref 0.2–?)

## 2024-10-25 PROCEDURE — 0502F SUBSEQUENT PRENATAL CARE: CPT

## 2024-10-31 ENCOUNTER — NON-APPOINTMENT (OUTPATIENT)
Age: 34
End: 2024-10-31

## 2024-11-03 NOTE — SWALLOW BEDSIDE ASSESSMENT ADULT - DATE
At baseline is non verbal , totally dependent on care, resides in intermediate care facility  Continue PTA baclofen TID   08-Mar-2018

## 2024-11-08 ENCOUNTER — TRANSCRIPTION ENCOUNTER (OUTPATIENT)
Age: 34
End: 2024-11-08

## 2024-11-08 ENCOUNTER — APPOINTMENT (OUTPATIENT)
Dept: OBGYN | Facility: CLINIC | Age: 34
End: 2024-11-08
Payer: COMMERCIAL

## 2024-11-08 VITALS
BODY MASS INDEX: 29.89 KG/M2 | WEIGHT: 186 LBS | DIASTOLIC BLOOD PRESSURE: 83 MMHG | SYSTOLIC BLOOD PRESSURE: 134 MMHG | HEIGHT: 66 IN

## 2024-11-08 DIAGNOSIS — Z34.90 ENCOUNTER FOR SUPERVISION OF NORMAL PREGNANCY, UNSPECIFIED, UNSPECIFIED TRIMESTER: ICD-10-CM

## 2024-11-08 LAB
APPEARANCE: CLEAR
BILIRUBIN URINE: NEGATIVE
BLOOD URINE: NEGATIVE
COLOR: YELLOW
GLUCOSE QUALITATIVE U: NEGATIVE
KETONES URINE: NEGATIVE
LEUKOCYTE ESTERASE URINE: ABNORMAL
NITRITE URINE: NEGATIVE
PH URINE: 6
PROTEIN URINE: NEGATIVE
SPECIFIC GRAVITY URINE: 1.02
UROBILINOGEN URINE: 0.2 (ref 0.2–?)

## 2024-11-08 PROCEDURE — 90715 TDAP VACCINE 7 YRS/> IM: CPT

## 2024-11-08 PROCEDURE — 90471 IMMUNIZATION ADMIN: CPT

## 2024-11-08 PROCEDURE — 0502F SUBSEQUENT PRENATAL CARE: CPT

## 2024-11-14 ENCOUNTER — NON-APPOINTMENT (OUTPATIENT)
Age: 34
End: 2024-11-14

## 2024-11-21 ENCOUNTER — APPOINTMENT (OUTPATIENT)
Dept: OBGYN | Facility: CLINIC | Age: 34
End: 2024-11-21
Payer: COMMERCIAL

## 2024-11-21 VITALS — TEMPERATURE: 96.9 F | WEIGHT: 190 LBS | HEIGHT: 66 IN | BODY MASS INDEX: 30.53 KG/M2

## 2024-11-21 LAB
APPEARANCE: CLEAR
BILIRUBIN URINE: NEGATIVE
BLOOD URINE: NEGATIVE
COLOR: YELLOW
GLUCOSE QUALITATIVE U: NEGATIVE
KETONES URINE: NEGATIVE
LEUKOCYTE ESTERASE URINE: ABNORMAL
NITRITE URINE: NEGATIVE
PH URINE: 7
PROTEIN URINE: ABNORMAL
SPECIFIC GRAVITY URINE: >=1.03
UROBILINOGEN URINE: 1 (ref 0.2–?)

## 2024-11-21 PROCEDURE — 0502F SUBSEQUENT PRENATAL CARE: CPT

## 2024-11-25 ENCOUNTER — NON-APPOINTMENT (OUTPATIENT)
Age: 34
End: 2024-11-25

## 2024-12-06 ENCOUNTER — NON-APPOINTMENT (OUTPATIENT)
Age: 34
End: 2024-12-06

## 2024-12-10 ENCOUNTER — APPOINTMENT (OUTPATIENT)
Dept: OBGYN | Facility: CLINIC | Age: 34
End: 2024-12-10

## 2024-12-12 ENCOUNTER — NON-APPOINTMENT (OUTPATIENT)
Age: 34
End: 2024-12-12

## 2024-12-13 ENCOUNTER — APPOINTMENT (OUTPATIENT)
Dept: OBGYN | Facility: CLINIC | Age: 34
End: 2024-12-13
Payer: COMMERCIAL

## 2024-12-13 VITALS
HEIGHT: 66 IN | BODY MASS INDEX: 30.53 KG/M2 | SYSTOLIC BLOOD PRESSURE: 128 MMHG | DIASTOLIC BLOOD PRESSURE: 87 MMHG | WEIGHT: 190 LBS

## 2024-12-13 PROCEDURE — 0502F SUBSEQUENT PRENATAL CARE: CPT

## 2024-12-16 LAB
GP B STREP DNA SPEC QL NAA+PROBE: DETECTED
SOURCE GBS: NORMAL

## 2024-12-19 ENCOUNTER — APPOINTMENT (OUTPATIENT)
Dept: OBGYN | Facility: CLINIC | Age: 34
End: 2024-12-19
Payer: COMMERCIAL

## 2024-12-19 VITALS — WEIGHT: 194 LBS | BODY MASS INDEX: 31.18 KG/M2 | HEIGHT: 66 IN

## 2024-12-19 VITALS — SYSTOLIC BLOOD PRESSURE: 128 MMHG | DIASTOLIC BLOOD PRESSURE: 81 MMHG

## 2024-12-19 PROCEDURE — 0502F SUBSEQUENT PRENATAL CARE: CPT

## 2024-12-26 ENCOUNTER — APPOINTMENT (OUTPATIENT)
Dept: OBGYN | Facility: CLINIC | Age: 34
End: 2024-12-26
Payer: COMMERCIAL

## 2024-12-26 VITALS
DIASTOLIC BLOOD PRESSURE: 89 MMHG | WEIGHT: 197 LBS | BODY MASS INDEX: 31.66 KG/M2 | SYSTOLIC BLOOD PRESSURE: 138 MMHG | HEIGHT: 66 IN

## 2024-12-26 LAB
APPEARANCE: CLEAR
BILIRUBIN URINE: NEGATIVE
BLOOD URINE: NEGATIVE
COLOR: YELLOW
GLUCOSE QUALITATIVE U: NEGATIVE
KETONES URINE: NEGATIVE
LEUKOCYTE ESTERASE URINE: ABNORMAL
NITRITE URINE: NEGATIVE
PH URINE: 7
PROTEIN URINE: ABNORMAL
SPECIFIC GRAVITY URINE: 1.02
UROBILINOGEN URINE: 1 (ref 0.2–?)

## 2024-12-26 PROCEDURE — 0502F SUBSEQUENT PRENATAL CARE: CPT

## 2024-12-28 ENCOUNTER — NON-APPOINTMENT (OUTPATIENT)
Age: 34
End: 2024-12-28

## 2025-01-02 ENCOUNTER — NON-APPOINTMENT (OUTPATIENT)
Age: 35
End: 2025-01-02

## 2025-01-02 ENCOUNTER — APPOINTMENT (OUTPATIENT)
Dept: OBGYN | Facility: CLINIC | Age: 35
End: 2025-01-02
Payer: COMMERCIAL

## 2025-01-02 VITALS
WEIGHT: 197 LBS | SYSTOLIC BLOOD PRESSURE: 141 MMHG | BODY MASS INDEX: 31.66 KG/M2 | DIASTOLIC BLOOD PRESSURE: 95 MMHG | HEIGHT: 66 IN

## 2025-01-02 PROCEDURE — 0502F SUBSEQUENT PRENATAL CARE: CPT

## 2025-01-08 ENCOUNTER — APPOINTMENT (OUTPATIENT)
Dept: OBGYN | Facility: CLINIC | Age: 35
End: 2025-01-08
Payer: COMMERCIAL

## 2025-01-08 ENCOUNTER — NON-APPOINTMENT (OUTPATIENT)
Age: 35
End: 2025-01-08

## 2025-01-08 VITALS — WEIGHT: 198 LBS | BODY MASS INDEX: 31.82 KG/M2 | HEIGHT: 66 IN

## 2025-01-08 DIAGNOSIS — Z34.90 ENCOUNTER FOR SUPERVISION OF NORMAL PREGNANCY, UNSPECIFIED, UNSPECIFIED TRIMESTER: ICD-10-CM

## 2025-01-08 PROCEDURE — 0502F SUBSEQUENT PRENATAL CARE: CPT

## 2025-01-08 PROCEDURE — 59426 ANTEPARTUM CARE ONLY: CPT

## 2025-01-15 ENCOUNTER — INPATIENT (INPATIENT)
Facility: HOSPITAL | Age: 35
LOS: 3 days | Discharge: HOME | End: 2025-01-19
Attending: STUDENT IN AN ORGANIZED HEALTH CARE EDUCATION/TRAINING PROGRAM | Admitting: STUDENT IN AN ORGANIZED HEALTH CARE EDUCATION/TRAINING PROGRAM
Payer: COMMERCIAL

## 2025-01-15 VITALS — HEART RATE: 93 BPM | DIASTOLIC BLOOD PRESSURE: 85 MMHG | SYSTOLIC BLOOD PRESSURE: 134 MMHG

## 2025-01-15 DIAGNOSIS — O61.0 FAILED MEDICAL INDUCTION OF LABOR: ICD-10-CM

## 2025-01-15 DIAGNOSIS — Z98.890 OTHER SPECIFIED POSTPROCEDURAL STATES: Chronic | ICD-10-CM

## 2025-01-15 LAB
APPEARANCE UR: ABNORMAL
BASOPHILS # BLD AUTO: 0.02 K/UL — SIGNIFICANT CHANGE UP (ref 0–0.2)
BASOPHILS NFR BLD AUTO: 0.2 % — SIGNIFICANT CHANGE UP (ref 0–1)
BILIRUB UR-MCNC: ABNORMAL
COLOR SPEC: SIGNIFICANT CHANGE UP
DIFF PNL FLD: NEGATIVE — SIGNIFICANT CHANGE UP
EOSINOPHIL # BLD AUTO: 0.06 K/UL — SIGNIFICANT CHANGE UP (ref 0–0.7)
EOSINOPHIL NFR BLD AUTO: 0.7 % — SIGNIFICANT CHANGE UP (ref 0–8)
GLUCOSE UR QL: NEGATIVE MG/DL — SIGNIFICANT CHANGE UP
HCT VFR BLD CALC: 39.2 % — SIGNIFICANT CHANGE UP (ref 37–47)
HGB BLD-MCNC: 13.9 G/DL — SIGNIFICANT CHANGE UP (ref 12–16)
IMM GRANULOCYTES NFR BLD AUTO: 0.5 % — HIGH (ref 0.1–0.3)
KETONES UR-MCNC: NEGATIVE MG/DL — SIGNIFICANT CHANGE UP
L&D DRUG SCREEN, URINE: SIGNIFICANT CHANGE UP
LEUKOCYTE ESTERASE UR-ACNC: ABNORMAL
LYMPHOCYTES # BLD AUTO: 1.35 K/UL — SIGNIFICANT CHANGE UP (ref 1.2–3.4)
LYMPHOCYTES # BLD AUTO: 14.8 % — LOW (ref 20.5–51.1)
MCHC RBC-ENTMCNC: 35.5 G/DL — SIGNIFICANT CHANGE UP (ref 32–37)
MCHC RBC-ENTMCNC: 35.5 PG — HIGH (ref 27–31)
MCV RBC AUTO: 100.3 FL — HIGH (ref 81–99)
MONOCYTES # BLD AUTO: 0.59 K/UL — SIGNIFICANT CHANGE UP (ref 0.1–0.6)
MONOCYTES NFR BLD AUTO: 6.4 % — SIGNIFICANT CHANGE UP (ref 1.7–9.3)
NEUTROPHILS # BLD AUTO: 7.08 K/UL — HIGH (ref 1.4–6.5)
NEUTROPHILS NFR BLD AUTO: 77.4 % — HIGH (ref 42.2–75.2)
NITRITE UR-MCNC: NEGATIVE — SIGNIFICANT CHANGE UP
NRBC # BLD: 0 /100 WBCS — SIGNIFICANT CHANGE UP (ref 0–0)
NRBC BLD-RTO: 0 /100 WBCS — SIGNIFICANT CHANGE UP (ref 0–0)
PH UR: 6 — SIGNIFICANT CHANGE UP (ref 5–8)
PLATELET # BLD AUTO: 134 K/UL — SIGNIFICANT CHANGE UP (ref 130–400)
PMV BLD: 12.3 FL — HIGH (ref 7.4–10.4)
PRENATAL SYPHILIS TEST: SIGNIFICANT CHANGE UP
PROT UR-MCNC: 30 MG/DL
RBC # BLD: 3.91 M/UL — LOW (ref 4.2–5.4)
RBC # FLD: 12.5 % — SIGNIFICANT CHANGE UP (ref 11.5–14.5)
SP GR SPEC: >1.03 — HIGH (ref 1–1.03)
UROBILINOGEN FLD QL: 1 MG/DL — SIGNIFICANT CHANGE UP (ref 0.2–1)
WBC # BLD: 9.15 K/UL — SIGNIFICANT CHANGE UP (ref 4.8–10.8)
WBC # FLD AUTO: 9.15 K/UL — SIGNIFICANT CHANGE UP (ref 4.8–10.8)

## 2025-01-15 PROCEDURE — 86900 BLOOD TYPING SEROLOGIC ABO: CPT

## 2025-01-15 PROCEDURE — C1887: CPT

## 2025-01-15 PROCEDURE — P9016: CPT

## 2025-01-15 PROCEDURE — 80053 COMPREHEN METABOLIC PANEL: CPT

## 2025-01-15 PROCEDURE — C1894: CPT

## 2025-01-15 PROCEDURE — 81001 URINALYSIS AUTO W/SCOPE: CPT

## 2025-01-15 PROCEDURE — 85610 PROTHROMBIN TIME: CPT

## 2025-01-15 PROCEDURE — 37244 VASC EMBOLIZE/OCCLUDE BLEED: CPT

## 2025-01-15 PROCEDURE — 86901 BLOOD TYPING SEROLOGIC RH(D): CPT

## 2025-01-15 PROCEDURE — 85730 THROMBOPLASTIN TIME PARTIAL: CPT

## 2025-01-15 PROCEDURE — 76937 US GUIDE VASCULAR ACCESS: CPT

## 2025-01-15 PROCEDURE — 36248 INS CATH ABD/L-EXT ART ADDL: CPT

## 2025-01-15 PROCEDURE — 85025 COMPLETE CBC W/AUTO DIFF WBC: CPT

## 2025-01-15 PROCEDURE — 36430 TRANSFUSION BLD/BLD COMPNT: CPT

## 2025-01-15 PROCEDURE — 36415 COLL VENOUS BLD VENIPUNCTURE: CPT

## 2025-01-15 PROCEDURE — C1760: CPT

## 2025-01-15 PROCEDURE — 80307 DRUG TEST PRSMV CHEM ANLYZR: CPT

## 2025-01-15 PROCEDURE — 85384 FIBRINOGEN ACTIVITY: CPT

## 2025-01-15 PROCEDURE — C1769: CPT

## 2025-01-15 PROCEDURE — 86592 SYPHILIS TEST NON-TREP QUAL: CPT

## 2025-01-15 PROCEDURE — 82570 ASSAY OF URINE CREATININE: CPT

## 2025-01-15 PROCEDURE — 59050 FETAL MONITOR W/REPORT: CPT

## 2025-01-15 PROCEDURE — 88307 TISSUE EXAM BY PATHOLOGIST: CPT

## 2025-01-15 PROCEDURE — 86850 RBC ANTIBODY SCREEN: CPT

## 2025-01-15 PROCEDURE — 80354 DRUG SCREENING FENTANYL: CPT

## 2025-01-15 PROCEDURE — 84156 ASSAY OF PROTEIN URINE: CPT

## 2025-01-15 PROCEDURE — 36247 INS CATH ABD/L-EXT ART 3RD: CPT

## 2025-01-15 PROCEDURE — 86923 COMPATIBILITY TEST ELECTRIC: CPT

## 2025-01-15 PROCEDURE — C1889: CPT

## 2025-01-15 PROCEDURE — 74174 CTA ABD&PLVS W/CONTRAST: CPT | Mod: MC

## 2025-01-15 RX ORDER — OXYTOCIN/0.9 % SODIUM CHLORIDE 10/500ML
167 PLASTIC BAG, INJECTION (ML) INTRAVENOUS
Qty: 30 | Refills: 0 | Status: DISCONTINUED | OUTPATIENT
Start: 2025-01-15 | End: 2025-01-19

## 2025-01-15 RX ORDER — SODIUM CHLORIDE 9 G/ML
1000 INJECTION, SOLUTION INTRAVENOUS
Refills: 0 | Status: DISCONTINUED | OUTPATIENT
Start: 2025-01-15 | End: 2025-01-16

## 2025-01-15 RX ORDER — OXYTOCIN/0.9 % SODIUM CHLORIDE 10/500ML
PLASTIC BAG, INJECTION (ML) INTRAVENOUS
Qty: 30 | Refills: 0 | Status: DISCONTINUED | OUTPATIENT
Start: 2025-01-15 | End: 2025-01-16

## 2025-01-15 RX ADMIN — SODIUM CHLORIDE 125 MILLILITER(S): 9 INJECTION, SOLUTION INTRAVENOUS at 22:04

## 2025-01-15 RX ADMIN — SODIUM CHLORIDE 125 MILLILITER(S): 9 INJECTION, SOLUTION INTRAVENOUS at 17:05

## 2025-01-15 RX ADMIN — Medication 200 GRAM(S): at 12:48

## 2025-01-15 RX ADMIN — Medication 2 MILLIUNIT(S)/MIN: at 12:48

## 2025-01-15 RX ADMIN — Medication 108 GRAM(S): at 21:04

## 2025-01-15 RX ADMIN — Medication 108 GRAM(S): at 17:05

## 2025-01-15 RX ADMIN — SODIUM CHLORIDE 125 MILLILITER(S): 9 INJECTION, SOLUTION INTRAVENOUS at 12:26

## 2025-01-15 NOTE — OB PROVIDER H&P - NSRISKFACTORSDETA_OBGYN_ALL_OB
Referral for High Risk Care Referral for High Risk Care/Pregnancy resulting from Infertility treatment Pregnancy resulting from Infertility treatment

## 2025-01-15 NOTE — OB RN PATIENT PROFILE - FALL HARM RISK - UNIVERSAL INTERVENTIONS
Bed in lowest position, wheels locked, appropriate side rails in place/Call bell, personal items and telephone in reach/Instruct patient to call for assistance before getting out of bed or chair/Non-slip footwear when patient is out of bed/[place] to call system/Physically safe environment - no spills, clutter or unnecessary equipment/Purposeful Proactive Rounding/Room/bathroom lighting operational, light cord in reach

## 2025-01-15 NOTE — OB PROVIDER H&P - ASSESSMENT
34 yr old  @ 39w6d rh POS/GBS POS, Di-Di twins for IOL     34 yr old  @ 39w6d rh POS/GBS POS, Di-Di twins for pitocin  IOL    Admit to L&D  IV Hydration and labs  Continuous TOCO and EFM  Medications as ordered  Pain management prn  Abx as ordered  pitocin  as ordered      34 yr old  @ 39w6d rh POS/GBS POS, Di-Di twins, vtx/vtx  for pitocin  IOL    Admit to L&D  IV Hydration and labs  Continuous TOCO and EFM  Medications as ordered  Pain management prn  Abx as ordered  pitocin  as ordered

## 2025-01-15 NOTE — OB PROVIDER H&P - NSICDXPASTSURGICALHX_GEN_ALL_CORE_FT
PAST SURGICAL HISTORY:  H/O dilation and curettage      PAST SURGICAL HISTORY:  S/P exploratory laparotomy

## 2025-01-15 NOTE — OB PROVIDER H&P - HISTORY OF PRESENT ILLNESS
34 yr old  @ 39w6d presents for IOL for di-di twins.   34 yr old  @ 39w6d  by IUI presents for IOL for di-di twins. Pt reports (+) FM x 2, denies VB, lof, ctx.   Pt with no current complaints at this time.  reports PN care has been uncomplicated.      Last EFW was - Baby A , Baby B          34 yr old  @ 39w6d  by IUI presents for IOL for di-di twins. Pt reports (+) FM x 2, denies VB, lof, ctx.   Pt with no current complaints at this time.  reports PN care has been uncomplicated.      Last EFW was - Baby A , Baby B

## 2025-01-15 NOTE — OB PROVIDER H&P - NSHPPHYSICALEXAM_GEN_ALL_CORE
Vital Signs (24 Hrs):  T(C): 36.3 (01-15-25 @ 11:48), Max: 36.3 (01-15-25 @ 11:48)  HR: 93 (01-15-25 @ 11:48) (93 - 93)  BP: 134/85 (01-15-25 @ 11:48) (134/85 - 134/85)  RR: 16 (01-15-25 @ 11:48) (16 - 16) Vital Signs (24 Hrs):  T(C): 36.3 (01-15-25 @ 11:48), Max: 36.3 (01-15-25 @ 11:48)  HR: 93 (01-15-25 @ 11:48) (93 - 93)  BP: 134/85 (01-15-25 @ 11:48) (134/85 - 134/85)  RR: 16 (01-15-25 @ 11:48) (16 - 16)    Gen: NAD  Abd: gravid soft NT no palpable ctx  VE:2/50/-2 intact Baby A Vtx  FHR  baby A: 135/mod/+accels  Baby B: 120/mod/+accels  TOCO: irregular Vital Signs (24 Hrs):  T(C): 36.3 (01-15-25 @ 11:48), Max: 36.3 (01-15-25 @ 11:48)  HR: 93 (01-15-25 @ 11:48) (93 - 93)  BP: 134/85 (01-15-25 @ 11:48) (134/85 - 134/85)  RR: 16 (01-15-25 @ 11:48) (16 - 16)    Gen: NAD  Abd: gravid soft NT no palpable ctx  VE:2/50/-2 intact Baby A   FHR  baby A: 135/mod/+accels vtx by sono  Baby B: 120/mod/+accels vtx by sono  TOCO: irregular

## 2025-01-15 NOTE — OB PROVIDER H&P - NS_FHRLOCB_OBGYN_ALL_OB
[Consultation] : a consultation [Asthma] : asthma [Sleep Apnea] : sleep apnea [Cough] : cough Below [place]

## 2025-01-15 NOTE — OB PROVIDER H&P - NSHPLABSRESULTS_GEN_ALL_CORE
12/13/24   A POS/neg  7.49>13.8/38.6<174  HBSag nr  HCV nr  rubella, measles, VZV immune  RPR neg  HIV nr        12/31/24 SONO TWINS A 6'2" B 6'8" VTX/VTX; BPP 8/8 X 2 ; WEIGHT DISCORD 5% 36 WKS 2 DAYS    12/5/24 SONO DARSHANA TWINS A EFW 2031 4'8"=33%; B 2358GM=5'3=62%; 14 % DISC. VTX/VTX  BPP 8/8 X 2    11/27/24 SONO [facility]- DARSHANA TWINS- FW DISC 19%;BPP 8/8 X 2; VTX/VTX; A 4'3"; B 5'3"  43% /79% RESP    11/22/24 SONO DARSHANA TWINS; BPP 8/8 X2; VTX/VTX; DOPPLERS 67 % A 53 % B    11/7/24; SONO [facility]- A 29 WK 1 DAY EFW 1245=2'12"=24TH %; B 31'4" EFW 1742=3'1\3=76TH;  DISC 29%; V/B    10/16 US baby a vtx, 3fv78ie (34%), AC 11%, B breech 2lb5oz (61%) - LS 10/25/2024 10:22 AM  discordance 17%

## 2025-01-15 NOTE — OB PROVIDER H&P - NSICDXFAMILYHX_GEN_ALL_CORE_FT
FAMILY HISTORY:  Father  Still living? Unknown  FH: hypertension, Age at diagnosis: Age Unknown    Mother  Still living? Unknown  FH: diabetes mellitus, Age at diagnosis: Age Unknown  FH: hypertension, Age at diagnosis: Age Unknown

## 2025-01-15 NOTE — OB PROVIDER H&P - PRO INTERPRETER NEED 2
scheduled for a robotic assisted laparoscopic right inguinal hernia repair on 9/19 with Dr. Escalera.
English

## 2025-01-16 ENCOUNTER — RESULT REVIEW (OUTPATIENT)
Age: 35
End: 2025-01-16

## 2025-01-16 LAB
APTT BLD: 22.8 SEC — CRITICAL LOW (ref 27–39.2)
APTT BLD: 26.6 SEC — LOW (ref 27–39.2)
BASOPHILS # BLD AUTO: 0.02 K/UL — SIGNIFICANT CHANGE UP (ref 0–0.2)
BASOPHILS # BLD AUTO: 0.04 K/UL — SIGNIFICANT CHANGE UP (ref 0–0.2)
BASOPHILS NFR BLD AUTO: 0.2 % — SIGNIFICANT CHANGE UP (ref 0–1)
BASOPHILS NFR BLD AUTO: 0.3 % — SIGNIFICANT CHANGE UP (ref 0–1)
EOSINOPHIL # BLD AUTO: 0.01 K/UL — SIGNIFICANT CHANGE UP (ref 0–0.7)
EOSINOPHIL # BLD AUTO: 0.03 K/UL — SIGNIFICANT CHANGE UP (ref 0–0.7)
EOSINOPHIL NFR BLD AUTO: 0.1 % — SIGNIFICANT CHANGE UP (ref 0–8)
EOSINOPHIL NFR BLD AUTO: 0.2 % — SIGNIFICANT CHANGE UP (ref 0–8)
FIBRINOGEN PPP-MCNC: 493 MG/DL — HIGH (ref 200–435)
FIBRINOGEN PPP-MCNC: 528 MG/DL — HIGH (ref 200–435)
HCT VFR BLD CALC: 34 % — LOW (ref 37–47)
HCT VFR BLD CALC: 36.3 % — LOW (ref 37–47)
HGB BLD-MCNC: 12 G/DL — SIGNIFICANT CHANGE UP (ref 12–16)
HGB BLD-MCNC: 12.8 G/DL — SIGNIFICANT CHANGE UP (ref 12–16)
IMM GRANULOCYTES NFR BLD AUTO: 0.3 % — SIGNIFICANT CHANGE UP (ref 0.1–0.3)
IMM GRANULOCYTES NFR BLD AUTO: 0.3 % — SIGNIFICANT CHANGE UP (ref 0.1–0.3)
INR BLD: 0.87 RATIO — SIGNIFICANT CHANGE UP (ref 0.65–1.3)
INR BLD: 0.9 RATIO — SIGNIFICANT CHANGE UP (ref 0.65–1.3)
LYMPHOCYTES # BLD AUTO: 0.72 K/UL — LOW (ref 1.2–3.4)
LYMPHOCYTES # BLD AUTO: 1.47 K/UL — SIGNIFICANT CHANGE UP (ref 1.2–3.4)
LYMPHOCYTES # BLD AUTO: 11.1 % — LOW (ref 20.5–51.1)
LYMPHOCYTES # BLD AUTO: 5.4 % — LOW (ref 20.5–51.1)
MCHC RBC-ENTMCNC: 34.4 PG — HIGH (ref 27–31)
MCHC RBC-ENTMCNC: 35.3 G/DL — SIGNIFICANT CHANGE UP (ref 32–37)
MCHC RBC-ENTMCNC: 35.3 G/DL — SIGNIFICANT CHANGE UP (ref 32–37)
MCHC RBC-ENTMCNC: 35.9 PG — HIGH (ref 27–31)
MCV RBC AUTO: 101.7 FL — HIGH (ref 81–99)
MCV RBC AUTO: 97.4 FL — SIGNIFICANT CHANGE UP (ref 81–99)
MONOCYTES # BLD AUTO: 0.52 K/UL — SIGNIFICANT CHANGE UP (ref 0.1–0.6)
MONOCYTES # BLD AUTO: 0.69 K/UL — HIGH (ref 0.1–0.6)
MONOCYTES NFR BLD AUTO: 3.9 % — SIGNIFICANT CHANGE UP (ref 1.7–9.3)
MONOCYTES NFR BLD AUTO: 5.2 % — SIGNIFICANT CHANGE UP (ref 1.7–9.3)
NEUTROPHILS # BLD AUTO: 11.02 K/UL — HIGH (ref 1.4–6.5)
NEUTROPHILS # BLD AUTO: 11.93 K/UL — HIGH (ref 1.4–6.5)
NEUTROPHILS NFR BLD AUTO: 82.9 % — HIGH (ref 42.2–75.2)
NEUTROPHILS NFR BLD AUTO: 90.1 % — HIGH (ref 42.2–75.2)
NRBC # BLD: 0 /100 WBCS — SIGNIFICANT CHANGE UP (ref 0–0)
NRBC # BLD: 0 /100 WBCS — SIGNIFICANT CHANGE UP (ref 0–0)
NRBC BLD-RTO: 0 /100 WBCS — SIGNIFICANT CHANGE UP (ref 0–0)
NRBC BLD-RTO: 0 /100 WBCS — SIGNIFICANT CHANGE UP (ref 0–0)
PLATELET # BLD AUTO: 129 K/UL — LOW (ref 130–400)
PLATELET # BLD AUTO: 136 K/UL — SIGNIFICANT CHANGE UP (ref 130–400)
PMV BLD: 11.7 FL — HIGH (ref 7.4–10.4)
PMV BLD: 12.1 FL — HIGH (ref 7.4–10.4)
PROTHROM AB SERPL-ACNC: 10.2 SEC — SIGNIFICANT CHANGE UP (ref 9.95–12.87)
PROTHROM AB SERPL-ACNC: 10.6 SEC — SIGNIFICANT CHANGE UP (ref 9.95–12.87)
RBC # BLD: 3.49 M/UL — LOW (ref 4.2–5.4)
RBC # BLD: 3.57 M/UL — LOW (ref 4.2–5.4)
RBC # FLD: 12.4 % — SIGNIFICANT CHANGE UP (ref 11.5–14.5)
RBC # FLD: 13.9 % — SIGNIFICANT CHANGE UP (ref 11.5–14.5)
WBC # BLD: 13.24 K/UL — HIGH (ref 4.8–10.8)
WBC # BLD: 13.29 K/UL — HIGH (ref 4.8–10.8)
WBC # FLD AUTO: 13.24 K/UL — HIGH (ref 4.8–10.8)
WBC # FLD AUTO: 13.29 K/UL — HIGH (ref 4.8–10.8)

## 2025-01-16 PROCEDURE — 76937 US GUIDE VASCULAR ACCESS: CPT | Mod: 26

## 2025-01-16 PROCEDURE — 36248 INS CATH ABD/L-EXT ART ADDL: CPT

## 2025-01-16 PROCEDURE — 37244 VASC EMBOLIZE/OCCLUDE BLEED: CPT

## 2025-01-16 PROCEDURE — 74174 CTA ABD&PLVS W/CONTRAST: CPT | Mod: 26

## 2025-01-16 PROCEDURE — 88307 TISSUE EXAM BY PATHOLOGIST: CPT | Mod: 26

## 2025-01-16 PROCEDURE — 36247 INS CATH ABD/L-EXT ART 3RD: CPT | Mod: 59,LT

## 2025-01-16 PROCEDURE — 59409 OBSTETRICAL CARE: CPT | Mod: U9

## 2025-01-16 RX ORDER — ONDANSETRON 4 MG/1
4 TABLET, ORALLY DISINTEGRATING ORAL ONCE
Refills: 0 | Status: COMPLETED | OUTPATIENT
Start: 2025-01-16 | End: 2025-01-16

## 2025-01-16 RX ORDER — MAGNESIUM HYDROXIDE 400 MG/5ML
30 SUSPENSION, ORAL (FINAL DOSE FORM) ORAL
Refills: 0 | Status: DISCONTINUED | OUTPATIENT
Start: 2025-01-16 | End: 2025-01-19

## 2025-01-16 RX ORDER — BACTERIOSTATIC SODIUM CHLORIDE 0.9 %
3 VIAL (ML) INJECTION EVERY 8 HOURS
Refills: 0 | Status: DISCONTINUED | OUTPATIENT
Start: 2025-01-16 | End: 2025-01-19

## 2025-01-16 RX ORDER — IBUPROFEN 600 MG/1
600 TABLET, FILM COATED ORAL EVERY 6 HOURS
Refills: 0 | Status: COMPLETED | OUTPATIENT
Start: 2025-01-16 | End: 2025-12-15

## 2025-01-16 RX ORDER — HYDROCORTISONE 1 %
1 CREAM (GRAM) TOPICAL EVERY 6 HOURS
Refills: 0 | Status: DISCONTINUED | OUTPATIENT
Start: 2025-01-16 | End: 2025-01-19

## 2025-01-16 RX ORDER — DIPHENHYDRAMINE HCL 25 MG
25 CAPSULE ORAL EVERY 6 HOURS
Refills: 0 | Status: DISCONTINUED | OUTPATIENT
Start: 2025-01-16 | End: 2025-01-19

## 2025-01-16 RX ORDER — WITCH HAZEL 86 ML/100ML
1 OIL ORAL; TOPICAL EVERY 4 HOURS
Refills: 0 | Status: DISCONTINUED | OUTPATIENT
Start: 2025-01-16 | End: 2025-01-19

## 2025-01-16 RX ORDER — ACETAMINOPHEN 160 MG/5ML
975 SUSPENSION ORAL EVERY 6 HOURS
Refills: 0 | Status: DISCONTINUED | OUTPATIENT
Start: 2025-01-16 | End: 2025-01-19

## 2025-01-16 RX ORDER — PNV WITH CALCIUM NO.11/IRON/FA 65 MG-1 MG
1 TABLET ORAL DAILY
Refills: 0 | Status: DISCONTINUED | OUTPATIENT
Start: 2025-01-16 | End: 2025-01-19

## 2025-01-16 RX ORDER — KETOROLAC TROMETHAMINE 10 MG
30 TABLET ORAL ONCE
Refills: 0 | Status: DISCONTINUED | OUTPATIENT
Start: 2025-01-16 | End: 2025-01-16

## 2025-01-16 RX ORDER — CLOSTRIDIUM TETANI TOXOID ANTIGEN (FORMALDEHYDE INACTIVATED), CORYNEBACTERIUM DIPHTHERIAE TOXOID ANTIGEN (FORMALDEHYDE INACTIVATED), BORDETELLA PERTUSSIS TOXOID ANTIGEN (GLUTARALDEHYDE INACTIVATED), BORDETELLA PERTUSSIS FILAMENTOUS HEMAGGLUTININ ANTIGEN (FORMALDEHYDE INACTIVATED), BORDETELLA PERTUSSIS PERTACTIN ANTIGEN, AND BORDETELLA PERTUSSIS FIMBRIAE 2/3 ANTIGEN 5; 2; 2.5; 5; 3; 5 [LF]/.5ML; [LF]/.5ML; UG/.5ML; UG/.5ML; UG/.5ML; UG/.5ML
0.5 INJECTION, SUSPENSION INTRAMUSCULAR ONCE
Refills: 0 | Status: DISCONTINUED | OUTPATIENT
Start: 2025-01-16 | End: 2025-01-19

## 2025-01-16 RX ORDER — PRAMOXINE HCL 1 %
1 CREAM (GRAM) RECTAL EVERY 4 HOURS
Refills: 0 | Status: DISCONTINUED | OUTPATIENT
Start: 2025-01-16 | End: 2025-01-19

## 2025-01-16 RX ORDER — OXYTOCIN/0.9 % SODIUM CHLORIDE 10/500ML
167 PLASTIC BAG, INJECTION (ML) INTRAVENOUS
Qty: 30 | Refills: 0 | Status: DISCONTINUED | OUTPATIENT
Start: 2025-01-16 | End: 2025-01-19

## 2025-01-16 RX ORDER — OXYCODONE HYDROCHLORIDE 30 MG/1
5 TABLET ORAL
Refills: 0 | Status: DISCONTINUED | OUTPATIENT
Start: 2025-01-16 | End: 2025-01-19

## 2025-01-16 RX ORDER — OXYCODONE HYDROCHLORIDE 30 MG/1
5 TABLET ORAL ONCE
Refills: 0 | Status: DISCONTINUED | OUTPATIENT
Start: 2025-01-16 | End: 2025-01-19

## 2025-01-16 RX ADMIN — ONDANSETRON 4 MILLIGRAM(S): 4 TABLET, ORALLY DISINTEGRATING ORAL at 06:04

## 2025-01-16 RX ADMIN — Medication 108 GRAM(S): at 09:26

## 2025-01-16 RX ADMIN — ACETAMINOPHEN 975 MILLIGRAM(S): 160 SUSPENSION ORAL at 23:31

## 2025-01-16 RX ADMIN — ACETAMINOPHEN 975 MILLIGRAM(S): 160 SUSPENSION ORAL at 22:47

## 2025-01-16 RX ADMIN — Medication 108 GRAM(S): at 05:30

## 2025-01-16 RX ADMIN — Medication 108 GRAM(S): at 01:30

## 2025-01-16 RX ADMIN — Medication 3 MILLILITER(S): at 22:24

## 2025-01-16 RX ADMIN — Medication 30 MILLIGRAM(S): at 12:43

## 2025-01-16 NOTE — PROCEDURE NOTE - ADDITIONAL PROCEDURE DETAILS
0971 Resume 1:1 care. Confirmed with ice T10 dermatome. Patient moving well and pain is well controlled.

## 2025-01-16 NOTE — CHART NOTE - NSCHARTNOTEFT_GEN_A_CORE
PGY 2 NOTE    At bedside for Twin B having recurrent decels down to 90s with resolution after 1-2 minutes. Pitocin discontinued. Patient repositioned to left lateral and IV fluid bolus.Patient reports no complaints, pain well controlled with epidural.  SVE deferred, last exam /-2 @0200 per Dr. Cisneros.      EFM:  - Twin A: 130/mod/+accel  - Twin B: 120/mod/no accel/recurrent prolonged decels to 90s, now resolved  TOCO: q2-3    34 yr old  @ 40w0d rh POS/GBS POS, IOL for Di-Di twins, in early labor, now with gHTN r/o preeclampsia, s/p AROM. Pitocin discontinued due to recurrent prolonged decels of Twin B.  - PMD and safety office made aware. Will restart pitocin with resumption of Category 1 tracing. Continue resuscitation efforts as needed.   - Cont EFM/[place]  - Clear Liquids & IV Hydration  - Pain Management prn  - Monitor vitals  - ampicillin for GBS ppx  - Expectant management

## 2025-01-16 NOTE — OB PROVIDER DELIVERY SUMMARY - NSPROVIDERDELIVERYNOTE_OBGYN_ALL_OB_FT
Patient was fully dilated and pushing. Baby A delivered OA, restituted to JUNE, Anterior shoulder delivered, followed by the posterior shoulder, rest of the body atraumatically. Baby suctioned, cord clamped and cut, and infant placed on mothers abdomen. Cord segment and blood obtained. Baby B then delivered OA, restituted to LEONOR, anterior shoulder delivered, then posterior shoulder delivered, rest of the body atraumatically. Baby suctioned, cord clamped and cut, and transferred to warmer. Placentas then delivered, intact. Fundus massaged and firm, with good hemostasis. Pitocin given postpartum. Vaginal vault, perineum, and cervix inspected, and second degree perineal laceration noted and repaired with 2-0 chromic in usual fashion. Deep right vaginal laceration repaired with 2-0 chromic. Good hemostasis noted. Live male and female infants delivered. Patient was fully dilated and pushing. Baby A delivered OA, restituted to JUNE, Anterior shoulder delivered, followed by the posterior shoulder, rest of the body atraumatically. Baby suctioned, cord clamped and cut, and infant placed on mothers abdomen. . Exam done, cephalic, fhr checked and normal. Baby B then delivered OA, restituted to LEONOR, anterior shoulder delivered, then posterior shoulder delivered, rest of the body atraumatically. Baby suctioned, cord clamped and cut, and transferred to warmer. Placentas then delivered, intact. Fundus massaged and firm, with good hemostasis. Pitocin given postpartum. Vaginal vault, perineum, and cervix inspected, and second degree perineal laceration noted and repaired with 2-0 chromic in usual fashion. Deep right vaginal laceration repaired with 2-0 chromic.  Oozing at right side wall, scant seen. packed with curlex soaked in thrombin. IR called for standby services

## 2025-01-16 NOTE — OB PROVIDER DELIVERY SUMMARY - NSSELHIDDEN_OBGYN_ALL_OB_FT
[NS_DeliveryAttending1_OBGYN_ALL_OB_FT:[email or web address]=],[NS_DeliveryRN_OBGYN_ALL_OB_FT:[id number]=],[NS_DeliveryAssist2_OBGYN_ALL_OB_FT:[id number]=]

## 2025-01-16 NOTE — OB RN DELIVERY SUMMARY - NSSELHIDDEN_OBGYN_ALL_OB_FT
[NS_DeliveryAttending1_OBGYN_ALL_OB_FT:[email or web address]=],[NS_DeliveryRN_OBGYN_ALL_OB_FT:[id number]=]

## 2025-01-16 NOTE — CHART NOTE - NSCHARTNOTEFT_GEN_A_CORE
PACU ANESTHESIA ADMISSION NOTE      Procedure: Aorto-iliac angiography/embolization   Post op diagnosis:      ____  Intubated  TV:______       Rate: ______      FiO2: ______    _X___  Patent Airway    _X___  Full return of protective reflexes    ____  Full recovery from anesthesia / back to baseline status    Vitals:  T:   HR: 92  BP: 135/78  RR: 17  SpO2: 97%    Mental Status:  ___X_ Awake   _____ Alert   _____ Drowsy   _____ Sedated    Nausea/Vomiting:  _X___ NO  ______Yes,   See Post - Op Orders          Pain Scale (0-10):  __0___    Treatment: ____ None    ____ See Post - Op/PCA Orders    Post - Operative Fluids:   ____ Oral   __X__ See Post - Op Orders    Plan: Discharge:   ____Home       _X____Floor     _____Critical Care    _____  Other:_________________    Comments: pt. tolerated procedure well, Transported to L and D, report endorsed to Rn.

## 2025-01-16 NOTE — ASU PATIENT PROFILE, ADULT - CAREGIVER NAME
Quality 226: Preventive Care And Screening: Tobacco Use: Screening And Cessation Intervention: Patient screened for tobacco use and is an ex/non-smoker Detail Level: Detailed Quality 110: Preventive Care And Screening: Influenza Immunization: Influenza Immunization previously received during influenza season MAC LAO

## 2025-01-16 NOTE — OB RN INTRAOPERATIVE NOTE - NSSELHIDDEN_OBGYN_ALL_OB_FT
[NS_DeliveryAttending1_OBGYN_ALL_OB_FT:[email or web address]=],[NS_DeliveryRN_OBGYN_ALL_OB_FT:[id number]=]

## 2025-01-16 NOTE — CHART NOTE - NSCHARTNOTEFT_GEN_A_CORE
PGY3 Note    Bedside to evaluate bleeding, packing in place, chux under patient saturated with blood (~122cc), continues to have bleeding. Uterus firm on abdominal exam. Packing removed, bleeding continued.     Vital Signs Last 24 Hrs  T(C): 36.5 (16 Jan 2025 14:29), Max: 37.1 (16 Jan 2025 08:23)  T(F): 97.7 (16 Jan 2025 14:29), Max: 98.78 (16 Jan 2025 08:23)  HR: 85 (16 Jan 2025 14:29) (65 - 210)  BP: 135/82 (16 Jan 2025 14:29) (109/57 - 154/87)  RR: 17 (16 Jan 2025 14:29) (16 - 18)  SpO2: 96% (16 Jan 2025 13:48) (89% - 100%)    Plan  -CT AP with IV contrast  -IR consulted  -Transfuse 2units pRBCs

## 2025-01-16 NOTE — ASU PATIENT PROFILE, ADULT - FALL HARM RISK - RISK INTERVENTIONS

## 2025-01-16 NOTE — CHART NOTE - NSCHARTNOTEFT_GEN_A_CORE
PGY3 consults note    Spoke to patient bedside after CT abdomen pelvis which noted hyperemia of uterus and vaginal extravasation. We discussed that having IR embolize the vessels associated with the bleeding would help stop the bleeding. There are risks to embolization, obstetrically the recommendation would be to avoid pregnancy in the future. She continues to have vaginal bleeding. The patient will talk to IR and make a decision. PGY3 consults note    Spoke to patient bedside after CT abdomen pelvis which noted hyperemia of uterus and vaginal extravasation. We discussed that having IR embolize the vessels associated with the bleeding would help stop the bleeding. There are risks to embolization, obstetrically the recommendation would be to avoid pregnancy in the future. She continues to have vaginal bleeding. The patient will talk to IR and make a decision.  Emphasized future pregnancies would be counseled not to occur-pt understands, all questions answered

## 2025-01-16 NOTE — CHART NOTE - NSCHARTNOTEFT_GEN_A_CORE
PGY 4 Chart Note    At patient bedside after transferred from IR suite. Fundus firm, bleeding minimal. Vitals bedside wnl. STAT labs ordered. Will continue to monitor UO. If labs stable and bleeding stable, will advance to CLD. IR nurse bedside for groin checks. Will continue to monitor on L&D.    D/w Dr Batista

## 2025-01-17 ENCOUNTER — TRANSCRIPTION ENCOUNTER (OUTPATIENT)
Age: 35
End: 2025-01-17

## 2025-01-17 LAB
ALBUMIN SERPL ELPH-MCNC: 2.9 G/DL — LOW (ref 3.5–5.2)
ALP SERPL-CCNC: 121 U/L — HIGH (ref 30–115)
ALT FLD-CCNC: 22 U/L — SIGNIFICANT CHANGE UP (ref 0–41)
ANION GAP SERPL CALC-SCNC: 10 MMOL/L — SIGNIFICANT CHANGE UP (ref 7–14)
APTT BLD: 29.8 SEC — SIGNIFICANT CHANGE UP (ref 27–39.2)
AST SERPL-CCNC: 30 U/L — SIGNIFICANT CHANGE UP (ref 0–41)
BASOPHILS # BLD AUTO: 0.03 K/UL — SIGNIFICANT CHANGE UP (ref 0–0.2)
BASOPHILS NFR BLD AUTO: 0.2 % — SIGNIFICANT CHANGE UP (ref 0–1)
BILIRUB SERPL-MCNC: 1.1 MG/DL — SIGNIFICANT CHANGE UP (ref 0.2–1.2)
BUN SERPL-MCNC: 6 MG/DL — LOW (ref 10–20)
CALCIUM SERPL-MCNC: 8 MG/DL — LOW (ref 8.4–10.5)
CHLORIDE SERPL-SCNC: 113 MMOL/L — HIGH (ref 98–110)
CO2 SERPL-SCNC: 19 MMOL/L — SIGNIFICANT CHANGE UP (ref 17–32)
CREAT ?TM UR-MCNC: 91 MG/DL — SIGNIFICANT CHANGE UP
CREAT SERPL-MCNC: 0.5 MG/DL — LOW (ref 0.7–1.5)
EGFR: 125 ML/MIN/1.73M2 — SIGNIFICANT CHANGE UP
EOSINOPHIL # BLD AUTO: 0.03 K/UL — SIGNIFICANT CHANGE UP (ref 0–0.7)
EOSINOPHIL NFR BLD AUTO: 0.2 % — SIGNIFICANT CHANGE UP (ref 0–8)
FIBRINOGEN PPP-MCNC: 505 MG/DL — HIGH (ref 200–435)
GLUCOSE SERPL-MCNC: 76 MG/DL — SIGNIFICANT CHANGE UP (ref 70–99)
HCT VFR BLD CALC: 33.7 % — LOW (ref 37–47)
HGB BLD-MCNC: 11.8 G/DL — LOW (ref 12–16)
IMM GRANULOCYTES NFR BLD AUTO: 0.4 % — HIGH (ref 0.1–0.3)
INR BLD: 0.92 RATIO — SIGNIFICANT CHANGE UP (ref 0.65–1.3)
LYMPHOCYTES # BLD AUTO: 0.83 K/UL — LOW (ref 1.2–3.4)
LYMPHOCYTES # BLD AUTO: 6.2 % — LOW (ref 20.5–51.1)
MCHC RBC-ENTMCNC: 34.4 PG — HIGH (ref 27–31)
MCHC RBC-ENTMCNC: 35 G/DL — SIGNIFICANT CHANGE UP (ref 32–37)
MCV RBC AUTO: 98.3 FL — SIGNIFICANT CHANGE UP (ref 81–99)
MONOCYTES # BLD AUTO: 0.6 K/UL — SIGNIFICANT CHANGE UP (ref 0.1–0.6)
MONOCYTES NFR BLD AUTO: 4.5 % — SIGNIFICANT CHANGE UP (ref 1.7–9.3)
NEUTROPHILS # BLD AUTO: 11.84 K/UL — HIGH (ref 1.4–6.5)
NEUTROPHILS NFR BLD AUTO: 88.5 % — HIGH (ref 42.2–75.2)
NRBC # BLD: 0 /100 WBCS — SIGNIFICANT CHANGE UP (ref 0–0)
NRBC BLD-RTO: 0 /100 WBCS — SIGNIFICANT CHANGE UP (ref 0–0)
PLATELET # BLD AUTO: 102 K/UL — LOW (ref 130–400)
PMV BLD: 12.3 FL — HIGH (ref 7.4–10.4)
POTASSIUM SERPL-MCNC: 3.7 MMOL/L — SIGNIFICANT CHANGE UP (ref 3.5–5)
POTASSIUM SERPL-SCNC: 3.7 MMOL/L — SIGNIFICANT CHANGE UP (ref 3.5–5)
PROT ?TM UR-MCNC: 83 MG/DLG/24H — SIGNIFICANT CHANGE UP
PROT SERPL-MCNC: 4.6 G/DL — LOW (ref 6–8)
PROT/CREAT UR-RTO: 0.9 RATIO — HIGH (ref 0–0.2)
PROTHROM AB SERPL-ACNC: 10.8 SEC — SIGNIFICANT CHANGE UP (ref 9.95–12.87)
RBC # BLD: 3.43 M/UL — LOW (ref 4.2–5.4)
RBC # FLD: 14 % — SIGNIFICANT CHANGE UP (ref 11.5–14.5)
SODIUM SERPL-SCNC: 142 MMOL/L — SIGNIFICANT CHANGE UP (ref 135–146)
WBC # BLD: 13.39 K/UL — HIGH (ref 4.8–10.8)
WBC # FLD AUTO: 13.39 K/UL — HIGH (ref 4.8–10.8)

## 2025-01-17 RX ORDER — ACETAMINOPHEN 160 MG/5ML
3 SUSPENSION ORAL
Qty: 0 | Refills: 0 | DISCHARGE
Start: 2025-01-17

## 2025-01-17 RX ORDER — PNV WITH CALCIUM NO.11/IRON/FA 65 MG-1 MG
1 TABLET ORAL
Qty: 0 | Refills: 0 | DISCHARGE
Start: 2025-01-17

## 2025-01-17 RX ORDER — IBUPROFEN 600 MG/1
600 TABLET, FILM COATED ORAL EVERY 6 HOURS
Refills: 0 | Status: DISCONTINUED | OUTPATIENT
Start: 2025-01-17 | End: 2025-01-19

## 2025-01-17 RX ORDER — IBUPROFEN 600 MG/1
1 TABLET, FILM COATED ORAL
Qty: 0 | Refills: 0 | DISCHARGE
Start: 2025-01-17

## 2025-01-17 RX ADMIN — IBUPROFEN 600 MILLIGRAM(S): 600 TABLET, FILM COATED ORAL at 17:38

## 2025-01-17 RX ADMIN — Medication 3 MILLILITER(S): at 06:35

## 2025-01-17 RX ADMIN — Medication 3 MILLILITER(S): at 22:05

## 2025-01-17 RX ADMIN — IBUPROFEN 600 MILLIGRAM(S): 600 TABLET, FILM COATED ORAL at 17:50

## 2025-01-17 RX ADMIN — ACETAMINOPHEN 975 MILLIGRAM(S): 160 SUSPENSION ORAL at 21:44

## 2025-01-17 RX ADMIN — ACETAMINOPHEN 975 MILLIGRAM(S): 160 SUSPENSION ORAL at 22:25

## 2025-01-17 RX ADMIN — IBUPROFEN 600 MILLIGRAM(S): 600 TABLET, FILM COATED ORAL at 04:14

## 2025-01-17 RX ADMIN — IBUPROFEN 600 MILLIGRAM(S): 600 TABLET, FILM COATED ORAL at 02:41

## 2025-01-17 RX ADMIN — ACETAMINOPHEN 975 MILLIGRAM(S): 160 SUSPENSION ORAL at 07:30

## 2025-01-17 RX ADMIN — Medication 3 MILLILITER(S): at 17:50

## 2025-01-17 RX ADMIN — ACETAMINOPHEN 975 MILLIGRAM(S): 160 SUSPENSION ORAL at 06:57

## 2025-01-17 RX ADMIN — IBUPROFEN 600 MILLIGRAM(S): 600 TABLET, FILM COATED ORAL at 11:07

## 2025-01-17 NOTE — DISCHARGE NOTE OB - CARE PROVIDER_API CALL
Robles Kim  Obstetrics and Gynecology  [address] [place] [id number]  Phone: [phone]  Fax: [phone]  Follow Up Time:

## 2025-01-17 NOTE — DISCHARGE NOTE OB - FINANCIAL ASSISTANCE
[facility] provides services at a reduced cost to those who are determined to be eligible through [facility]’s financial assistance program. Information regarding [facility]’s financial assistance program can be found by going to [email or web address] or by calling [phone].

## 2025-01-17 NOTE — DISCHARGE NOTE OB - NS MD DC FALL RISK RISK
For information on Fall & Injury Prevention, visit: [email or web address] OR  [email or web address] OR  https://www.cdc.gov/steadi/patient.html

## 2025-01-17 NOTE — DISCHARGE NOTE OB - PLAN OF CARE
No heavy lifting x4 weeks. Nothing in the vagina for 6 weeks - no sex, tampons, douching, tub baths or pools. May Shower. If you have a fever over 100.4F, severe pain or severe bleeding, please call your doctor or visit the emergency room. Follow up in 6 weeks for postpartum check with your doctor. If you continue to have urinary incontinence, please follow up with urogynecology for evaluation

## 2025-01-17 NOTE — DISCHARGE NOTE OB - PATIENT PORTAL LINK FT
You can access the FollowMyHealth Patient Portal offered by [facility] by registering at the following website: [email or web address]. By joining [facility]’s FollowMyHealth portal, you will also be able to view your health information using other applications (apps) compatible with our system.

## 2025-01-17 NOTE — DISCHARGE NOTE OB - HOSPITAL COURSE
Presented to [facility] for IOL at term, twin gestation. Delivered both twins. Birth complicated by deep right vaginal laceration. Patient with continued bleeding requiring 2u pRBCs. S/p IR embolization of b/l hypogastric arteries. Recovering well. Stable for discharge.

## 2025-01-17 NOTE — DISCHARGE NOTE OB - MEDICATION SUMMARY - MEDICATIONS TO TAKE
I will START or STAY ON the medications listed below when I get home from the hospital:    ibuprofen 600 mg oral tablet  -- 1 tab(s) by mouth every 6 hours  -- Indication: For pain    acetaminophen 325 mg oral tablet  -- 3 tab(s) by mouth every 6 hours  -- Indication: For pain    Prenatal Multivitamins with Folic Acid 1 mg oral tablet  -- 1 tab(s) by mouth once a day  -- Indication: For postpartum    simethicone 80 mg oral tablet, chewable  -- 1 tab(s) by mouth every 4 hours As needed Gas  -- Indication: For Gas pain

## 2025-01-17 NOTE — DISCHARGE NOTE OB - CARE PLAN
1 Principal Discharge DX:	Vaginal delivery  Assessment and plan of treatment:	No heavy lifting x4 weeks. Nothing in the vagina for 6 weeks - no sex, tampons, douching, tub baths or pools. May Shower. If you have a fever over 100.4F, severe pain or severe bleeding, please call your doctor or visit the emergency room. Follow up in 6 weeks for postpartum check with your doctor.   Principal Discharge DX:	Vaginal delivery  Assessment and plan of treatment:	No heavy lifting x4 weeks. Nothing in the vagina for 6 weeks - no sex, tampons, douching, tub baths or pools. May Shower. If you have a fever over 100.4F, severe pain or severe bleeding, please call your doctor or visit the emergency room. Follow up in 6 weeks for postpartum check with your doctor.  Secondary Diagnosis:	Incontinence  Assessment and plan of treatment:	If you continue to have urinary incontinence, please follow up with urogynecology for evaluation

## 2025-01-18 PROCEDURE — 99231 SBSQ HOSP IP/OBS SF/LOW 25: CPT

## 2025-01-18 RX ADMIN — Medication 3 MILLILITER(S): at 06:14

## 2025-01-18 RX ADMIN — IBUPROFEN 600 MILLIGRAM(S): 600 TABLET, FILM COATED ORAL at 00:24

## 2025-01-18 RX ADMIN — IBUPROFEN 600 MILLIGRAM(S): 600 TABLET, FILM COATED ORAL at 21:10

## 2025-01-18 RX ADMIN — IBUPROFEN 600 MILLIGRAM(S): 600 TABLET, FILM COATED ORAL at 05:57

## 2025-01-18 RX ADMIN — Medication 1 TABLET(S): at 12:10

## 2025-01-18 RX ADMIN — Medication 3 MILLILITER(S): at 14:02

## 2025-01-18 RX ADMIN — IBUPROFEN 600 MILLIGRAM(S): 600 TABLET, FILM COATED ORAL at 00:52

## 2025-01-18 RX ADMIN — IBUPROFEN 600 MILLIGRAM(S): 600 TABLET, FILM COATED ORAL at 12:00

## 2025-01-18 RX ADMIN — IBUPROFEN 600 MILLIGRAM(S): 600 TABLET, FILM COATED ORAL at 20:11

## 2025-01-18 RX ADMIN — IBUPROFEN 600 MILLIGRAM(S): 600 TABLET, FILM COATED ORAL at 06:31

## 2025-01-18 RX ADMIN — IBUPROFEN 600 MILLIGRAM(S): 600 TABLET, FILM COATED ORAL at 14:01

## 2025-01-18 RX ADMIN — ACETAMINOPHEN 975 MILLIGRAM(S): 160 SUSPENSION ORAL at 23:23

## 2025-01-18 NOTE — PROGRESS NOTE ADULT - ATTENDING COMMENTS
Patient received Hydralazine once this shift for SBP > 160; see MAR. No signs/symptoms of bleeding noted this shift. Patient tolerating regular diet. No distress noted.   33 y/o PPD#2 s/p  of twins and s/p b/l hypogastric embolization for management of PPH, doing well today. Routine postpartum care. Post embolization care per IR. Continue close observation.

## 2025-01-19 VITALS
RESPIRATION RATE: 18 BRPM | SYSTOLIC BLOOD PRESSURE: 117 MMHG | HEART RATE: 83 BPM | TEMPERATURE: 98 F | DIASTOLIC BLOOD PRESSURE: 69 MMHG | OXYGEN SATURATION: 99 %

## 2025-01-19 RX ORDER — OXYCODONE HYDROCHLORIDE 30 MG/1
1 TABLET ORAL
Qty: 10 | Refills: 0
Start: 2025-01-19 | End: 2025-01-20

## 2025-01-19 RX ADMIN — ACETAMINOPHEN 975 MILLIGRAM(S): 160 SUSPENSION ORAL at 08:25

## 2025-01-19 RX ADMIN — Medication 3 MILLILITER(S): at 00:16

## 2025-01-19 RX ADMIN — ACETAMINOPHEN 975 MILLIGRAM(S): 160 SUSPENSION ORAL at 00:20

## 2025-01-19 RX ADMIN — IBUPROFEN 600 MILLIGRAM(S): 600 TABLET, FILM COATED ORAL at 06:35

## 2025-01-19 NOTE — PROGRESS NOTE ADULT - SUBJECTIVE AND OBJECTIVE BOX
PGY 2 Note    S: Patient seen and examined at bedside. Doing well, pain tolerable at this time, declines epidural at this time.     Vital Signs Last 24 Hrs  T(C): 36.8 (15 Marlo 2025 20:27), Max: 36.8 (15 Marlo 2025 20:27)  T(F): 98.24 (15 Marlo 2025 20:27), Max: 98.24 (15 Marlo 2025 20:27)  HR: 81 (15 Marlo 2025 20:30) (76 - 210)  BP: 148/86 (15 Marlo 2025 20:30) (115/59 - 149/95)  RR: 18 (15 Marlo 2025 20:27) (16 - 18)      EFM:   - Twin A: 125/mod/+accel  - Twin B: 135/mod/+accel  TOCO: q2-3  SVE: 2/50/-2 intact    Labs:                        13.9   9.15  )-----------( 134      ( 15 Marlo 2025 12:20 )             39.2             ABO RH Interpretation: A POS (01-15-25 @ 12:09)    Urinalysis Basic - ( 15 Marlo 2025 12:20 )    Color: Dark Yellow / Appearance: Cloudy / SG: >1.030 / pH: x  Gluc: x / Ketone: Negative mg/dL  / Bili: Small / Urobili: 1.0 mg/dL   Blood: x / Protein: 30 mg/dL / Nitrite: Negative   Leuk Esterase: Small / RBC: 3 /HPF / WBC 23 /HPF   Sq Epi: x / Non Sq Epi: 21 /HPF / Bacteria: Many /HPF        Prenatal Syphilis Test: Nonreact (01-15-25 @ 12:20)      Medications:  ABO RH Interpretation: A POS (01-15-25 @ 12:09)     Pitocin: @12mU              
PGY1 NOTE  Chief Complaint: Postpartum    HPI: Pt doing well, pain well controlled on current regimen. No overnight events, no acute complaints.   Denies HA, dizziness, nausea/vomiting, lightheadedness, fevers, chills, palpitations CP, SOB, LE edema, heavy vaginal bleeding.   Ambulating yes  Voiding yes, pt endorses single episode of incontinence (did not make it to toilet), denies further episodes  Diet regular  Breastfeeding attempting    PAST MEDICAL & SURGICAL HISTORY:  Endometriosis  Gestational hypertension  S/P exploratory laparotomy    Physical Exam  Vital Signs Last 24 Hrs  T(F): 97.9 (18 Jan 2025 07:10), Max: 98.2 (17 Jan 2025 16:41)  HR: 93 (18 Jan 2025 07:10) (76 - 102)  BP: 133/92 (18 Jan 2025 07:10) (109/67 - 133/92)  RR: 18 (18 Jan 2025 07:10) (18 - 18)    Physical exam:  General - AAOx3, NAD  Heart - S1S2, RRR  Lungs - CTA BL. Breathing unlabored. Speaking in clear complete sentences.   Abdomen:  - Soft, nontender, nondistended, BS+  - Fundus firm, nontender, below the umbilicus  Pelvis/Vagina - Normal rubra lochia  Extremities - No calf tenderness, no swelling    Labs:                        11.8   13.39 )-----------( 102      ( 17 Jan 2025 07:00 )             33.7                         12.0   13.29 )-----------( 136      ( 16 Jan 2025 21:32 )             34.0     ABO RH Interpretation: A POS (01-15-25 @ 12:09)  Antibody Screen: NEG (01-15-25 @ 12:09)        acetaminophen     Tablet .. 975 milliGRAM(s) Oral every 6 hours, Routine  benzocaine 20%/menthol 0.5% Spray 1 Spray(s) Topical every 6 hours, Routine PRN for Perineal discomfort  dibucaine 1% Ointment 1 Application(s) Topical every 6 hours, Routine PRN Perineal discomfort  diphenhydrAMINE 25 milliGRAM(s) Oral every 6 hours, Routine PRN Pruritus  diphtheria/tetanus/pertussis (acellular) Vaccine (Adacel) 0.5 milliLiter(s) IntraMuscular once, Routine  hydrocortisone 1% Cream 1 Application(s) Topical every 6 hours, Routine PRN Moderate Pain (4-6)  ibuprofen  Tablet. 600 milliGRAM(s) Oral every 6 hours, Routine  lanolin Ointment 1 Application(s) Topical every 6 hours, Routine PRN nipple soreness  magnesium hydroxide Suspension 30 milliLiter(s) Oral two times a day, Routine PRN Constipation  oxyCODONE    IR 5 milliGRAM(s) Oral every 3 hours, Routine PRN Moderate to Severe Pain (4-10)  oxyCODONE    IR 5 milliGRAM(s) Oral once, Routine PRN Moderate to Severe Pain (4-10)  oxytocin Infusion 167 milliUNIT(s)/Min (167 mL/Hr) IV Continuous <Continuous>, Routine  oxytocin Infusion 167 milliUNIT(s)/Min (167 mL/Hr) IV Continuous <Continuous>, Routine  pramoxine 1%/zinc 5% Cream 1 Application(s) Topical every 4 hours, Routine PRN Moderate Pain (4-6)  prenatal multivitamin 1 Tablet(s) Oral daily, Routine  simethicone 80 milliGRAM(s) Chew every 4 hours, Routine PRN Gas  sodium chloride 0.9% lock flush 3 milliLiter(s) IV Push every 8 hours, Routine  witch hazel Pads 1 Application(s) Topical every 4 hours, Routine PRN Perineal discomfort    
PGY3 Note    Patient seen and examined. Pain well controlled at this time. No complaints at this time. Denies fever, chills, nausea, vomiting, chest pain, shortness of breath, severe abdominal pain, heavy vaginal bleeding, dizziness, dyspnea, chest palpitation.    Patient is ambulating.   Passing flatus.   Tolerating regular diet   Voiding without difficulty, no dysuria     MEDICATIONS  (STANDING):  acetaminophen     Tablet .. 975 milliGRAM(s) Oral every 6 hours  diphtheria/tetanus/pertussis (acellular) Vaccine (Adacel) 0.5 milliLiter(s) IntraMuscular once  ibuprofen  Tablet. 600 milliGRAM(s) Oral every 6 hours  oxytocin Infusion 167 milliUNIT(s)/Min (167 mL/Hr) IV Continuous <Continuous>  oxytocin Infusion 167 milliUNIT(s)/Min (167 mL/Hr) IV Continuous <Continuous>  prenatal multivitamin 1 Tablet(s) Oral daily  sodium chloride 0.9% lock flush 3 milliLiter(s) IV Push every 8 hours    Physical Exam  Vital Signs Last 24 Hrs  T(C): 36.8 (19 Jan 2025 00:07), Max: 36.8 (19 Jan 2025 00:07)  T(F): 98.3 (19 Jan 2025 00:07), Max: 98.3 (19 Jan 2025 00:07)  HR: 76 (19 Jan 2025 00:07) (76 - 86)  BP: 130/86 (19 Jan 2025 00:07) (130/86 - 131/86)  RR: 18 (19 Jan 2025 00:07) (18 - 20)  SpO2: 96% (19 Jan 2025 00:07) (96% - 96%)    Parameters below as of 18 Jan 2025 15:47  Patient On (Oxygen Delivery Method): room air    Gen: AAOx3, NAD  Cardio: RRR, S1S2, no M/R/G  Resp: CTAB  Ext: No calf tenderness, no swelling  Fundus: firm, below umbilicus. Nontender.   Abd: Soft, nontender, nondistended, BS+  Lochia: minimal      Labs:                        11.8   13.39 )-----------( 102      ( 17 Jan 2025 07:00 )             33.7                         12.0   13.29 )-----------( 136      ( 16 Jan 2025 21:32 )             34.0      
PGY4 Note    Patient seen at bedside for evaluation of labor progression, doing well, no complaints.     T(F): 98.24 (01-15-25 @ 20:27), Max: 98.24 (01-15-25 @ 20:27)  HR: 76 (01-16-25 @ 02:03) (65 - 210)  BP: 121/74 (01-16-25 @ 01:50) (109/60 - 154/87)  RR: 18 (01-16-25 @ 00:59) (16 - 18)  SpO2: 97% (01-16-25 @ 02:03) (94% - 97%)    Fetus A- 135/mod/pos acc  FetuS B- 130/mod/pos acc/s/p 2min dece;  TOCO: 2 mins  SVE: 2/80/-2, vtx,, s/p AROM    Medications:  (ADM OVERRIDE): 1 Each (01-15-25 @ 12:40)  (ADM OVERRIDE): 1 Each (01-15-25 @ 12:40)  (ADM OVERRIDE): 1 Each (01-15-25 @ 20:43)  (ADM OVERRIDE): 1 Each (01-16-25 @ 00:13)  (ADM OVERRIDE): 1 Each (01-16-25 @ 01:46)  ampicillin  IVPB: 200 mL/Hr (01-15-25 @ 12:48)  ampicillin  IVPB: 108 mL/Hr (01-16-25 @ 01:30),  108 mL/Hr (01-15-25 @ 21:04),  108 mL/Hr (01-15-25 @ 17:05)  lactated ringers.: 125 mL/Hr (01-15-25 @ 17:06),  125 mL/Hr (01-15-25 @ 12:27),  125 mL/Hr (01-15-25 @ 11:58)  oxytocin Infusion.: 2 mL/Hr (01-15-25 @ 12:39), 6U pitocin      Labs:                        13.9   9.15  )-----------( 134      ( 15 Marlo 2025 12:20 )             39.2           ABO RH Interpretation: A POS (01-15-25 @ 12:09)    Antibody Screen: NEG (01-15-25 @ 12:09)    Urinalysis Basic - ( 15 Marlo 2025 12:20 )    Color: Dark Yellow / Appearance: Cloudy / SG: >1.030 / pH: x  Gluc: x / Ketone: Negative mg/dL  / Bili: Small / Urobili: 1.0 mg/dL   Blood: x / Protein: 30 mg/dL / Nitrite: Negative   Leuk Esterase: Small / RBC: 3 /HPF / WBC 23 /HPF   Sq Epi: x / Non Sq Epi: 21 /HPF / Bacteria: Many /HPF      L&amp;D Drug Screen, Urine: Done (01-15-25 @ 12:20)    Prenatal Syphilis Test: Nonreact (01-15-25 @ 12:20)          
PGY4 Note    Patient seen at bedside for evaluation of labor progression, doing well, no complaints. Discussed with patient that she had been on pitocin for almost >10 hours with a good contraction pattern but still 2cm dilated. Patient previously expressed to PMD and other residents that she wanted minimal intervention apart from pitocin and previously declined AROM. Discussed with patient r/b/a of AROM to help progress her labor. Patient asked questions about risks of chorioamnionitis and arrest of labor, all questions answered. Patient amenable to AROM at this time.     T(F): 98.24 (01-15-25 @ 20:27), Max: 98.24 (01-15-25 @ 20:27)  HR: 81 (01-15-25 @ 22:29) (76 - 210)  BP: 143/86 (01-15-25 @ 22:29) (115/59 - 149/95)  RR: 18 (01-15-25 @ 20:27) (16 - 18)    Fetus A: 135/mod/pos acc  Fetus B: 150/mod/pos acc  TOCO: 2 mins  SVE: 2/50/-2, vtx, s/p AROM clear    Medications:  (ADM OVERRIDE): 1 Each (01-15-25 @ 12:40)  (ADM OVERRIDE): 1 Each (01-15-25 @ 12:40)  (ADM OVERRIDE): 1 Each (01-15-25 @ 20:43)  ampicillin  IVPB: 200 mL/Hr (01-15-25 @ 12:48)  ampicillin  IVPB: 108 mL/Hr (01-15-25 @ 21:04),  108 mL/Hr (01-15-25 @ 17:05)  lactated ringers.: 125 mL/Hr (01-15-25 @ 17:06),  125 mL/Hr (01-15-25 @ 12:27),  125 mL/Hr (01-15-25 @ 11:58)  oxytocin Infusion.: 2 mL/Hr (01-15-25 @ 12:39)      Labs:                        13.9   9.15  )-----------( 134      ( 15 Marlo 2025 12:20 )             39.2           ABO RH Interpretation: A POS (01-15-25 @ 12:09)    Antibody Screen: NEG (01-15-25 @ 12:09)    Urinalysis Basic - ( 15 Marlo 2025 12:20 )    Color: Dark Yellow / Appearance: Cloudy / SG: >1.030 / pH: x  Gluc: x / Ketone: Negative mg/dL  / Bili: Small / Urobili: 1.0 mg/dL   Blood: x / Protein: 30 mg/dL / Nitrite: Negative   Leuk Esterase: Small / RBC: 3 /HPF / WBC 23 /HPF   Sq Epi: x / Non Sq Epi: 21 /HPF / Bacteria: Many /HPF      L&amp;D Drug Screen, Urine: Done (01-15-25 @ 12:20)    Prenatal Syphilis Test: Nonreact (01-15-25 @ 12:20)        
PGY4 Note:    Pt seen and evaluated at bedside. Pain well controlled. Vaginal bleeding minimal. Denies dizziness/lightheadedness/CP/SOB/palpitations. Denies fever, chills, nausea/vomiting, diarrhea, dysuria, LE pain.     Ambulating: not yet OOB  Voiding: patterson in place  Breast or bottle feeding: bottle feeding, pumping  Diet: tolerating regular diet    Physical Exam  Vital Signs Last 24 Hrs  T(C): 37.7 (17 Jan 2025 02:00), Max: 37.7 (17 Jan 2025 02:00)  T(F): 99.8 (17 Jan 2025 02:00), Max: 99.8 (17 Jan 2025 02:00)  HR: 99 (17 Jan 2025 02:17) (72 - 106)  BP: 128/82 (17 Jan 2025 02:17) (109/73 - 145/83)  RR: 20 (16 Jan 2025 22:56) (16 - 20)  SpO2: 100% (16 Jan 2025 22:56) (89% - 100%)    Parameters below as of 16 Jan 2025 22:56  Patient On (Oxygen Delivery Method): room air    Gen: AAOx3, NAD  Fundus: firm, below umbilicus   Abd: Soft, nontender, nondistended  Lochia: minimal   Ext: No calf tenderness, no swelling    Labs:                        12.0   13.29 )-----------( 136      ( 16 Jan 2025 21:32 )             34.0                         12.8   13.24 )-----------( 129      ( 16 Jan 2025 10:40 )             36.3        MEDICATIONS  (STANDING):  acetaminophen     Tablet .. 975 milliGRAM(s) Oral every 6 hours  diphtheria/tetanus/pertussis (acellular) Vaccine (Adacel) 0.5 milliLiter(s) IntraMuscular once  ibuprofen  Tablet. 600 milliGRAM(s) Oral every 6 hours  oxytocin Infusion 167 milliUNIT(s)/Min (167 mL/Hr) IV Continuous <Continuous>  oxytocin Infusion 167 milliUNIT(s)/Min (167 mL/Hr) IV Continuous <Continuous>  prenatal multivitamin 1 Tablet(s) Oral daily  sodium chloride 0.9% lock flush 3 milliLiter(s) IV Push every 8 hours    MEDICATIONS  (PRN):  benzocaine 20%/menthol 0.5% Spray 1 Spray(s) Topical every 6 hours PRN for Perineal discomfort  dibucaine 1% Ointment 1 Application(s) Topical every 6 hours PRN Perineal discomfort  diphenhydrAMINE 25 milliGRAM(s) Oral every 6 hours PRN Pruritus  hydrocortisone 1% Cream 1 Application(s) Topical every 6 hours PRN Moderate Pain (4-6)  lanolin Ointment 1 Application(s) Topical every 6 hours PRN nipple soreness  magnesium hydroxide Suspension 30 milliLiter(s) Oral two times a day PRN Constipation  oxyCODONE    IR 5 milliGRAM(s) Oral every 3 hours PRN Moderate to Severe Pain (4-10)  oxyCODONE    IR 5 milliGRAM(s) Oral once PRN Moderate to Severe Pain (4-10)  pramoxine 1%/zinc 5% Cream 1 Application(s) Topical every 4 hours PRN Moderate Pain (4-6)  simethicone 80 milliGRAM(s) Chew every 4 hours PRN Gas  witch hazel Pads 1 Application(s) Topical every 4 hours PRN Perineal discomfort    
Patient desiring to have discussion regarding care during labor. Per patient and mother, they are upset that I desired to proceed with ROM yesterday. They are also upset I did not personally call them regarding timing for induction and that I was recommending induction by 38w6d (based on ACOG guidelines). Per their requests care will be transferred to service attending. Service attending made aware and accepting care for delivery. 
Patient seen at bedside for labor progression. At the time of discussion patient had been on pitocin for 4hrs with adequate contractions q2-3min. Tracing category I x2. Discussed with patient and mother recommendation for ROM to continue with induction process. This had been discussed in the office multiple times in the past several weeks. Patient now combative and unwilling to proceed with ROM. Stating "the babies still need to get used to pitocin". I attempted to discuss when she would be willing to have rupture performed and she could not give me an answer, stating she is in no rush and OK being here for days. Patient's mother was also extremely rude and continuously interrupted me stating I forced patient into induction and she does not want to be here. Patient's mother claiming risk of demise is minimal and better to wait for natural labor. Also claiming I will cause her daughter to have an infection if I rupture her membranes. I re-iterated that although induction for di/di twins is recommended by 38w6d (and she was previously scheduled for 38w5d then refused to come in) she does not need to be induced if she does not want to be. I offered to have patient leave AMA if she did not want to be here and find a separate practice willing to wait past recommended delivery date. I also offered to have patient transferred to service provider in hospital if her beliefs did not align with mine. I discussed prolonged pitocin increases risk for PPH (as her risk is also elevated due to multifetal gestation). At this point patient desired for me to leave the room and continue with pitocin without ROM. Service attending notified about difficult/rude patient and possibility of transfer to service. 
INTERVENTIONAL RADIOLOGY BRIEF-OPERATIVE NOTE    Procedure:  Pelvic angiography with embolization    Pre-Op Diagnosis:  Post-partum hemorrhage    Post-Op Diagnosis:  Same    Attending:  Amisha / MARIZOL Moreno & Tamela (Anaesthesia)  Resident:  None    Anesthesia (type):  [ ] General Anesthesia  [x] Sedation-- see anaesthesia record  [ ] Spinal Anesthesia  [X] Local/Regional-- 1% Lidocaine, SQ, left groin, 4 cc    Contrast:  110 cc Visipaque-320 contrast, ia    Estimated Blood Loss:  20 cc    Condition:   [ ] Critical  [ ] Serious  [X] Fair   [ ] Good    Findings/Follow up Plan of Care:  No bleed seen in regions of suspected injury along right pelvic sidewall and vagina, but bilateral uterine artery hypervascularity, R>L seen, with questionable punctate foci of hemorrhage seen.  Gelfoam embolization performed in bilateral uterine arteries with resultant hemostasis distally and presevation of flow to neighboring branches.  Patient remained HDS throughout the procedure, which she tolerated well.     Specimens Removed:  None    Implants:  None    Complications:  None immediate    Disposition:  Back to floor;  serial cbc's, etc.      Please call Interventional Radiology [phone] with any questions, concerns, or issues.        
PGY1 Note    Patient seen and examined. Endorsing some perineal pain that improves with tylenol and motrin. Denies fever, chills, nausea, vomiting, chest pain, shortness of breath, severe abdominal pain, heavy vaginal bleeding. Patient is not yet ambulating.    Passing flatus, Denies bowel movement.   Diet: Regular, tolerating clears.  Voiding: patterson catheter in place  PPH: denies dizziness, dyspnea, chest palpitation    MEDICATIONS  (STANDING):  acetaminophen     Tablet .. 975 milliGRAM(s) Oral every 6 hours  diphtheria/tetanus/pertussis (acellular) Vaccine (Adacel) 0.5 milliLiter(s) IntraMuscular once  ibuprofen  Tablet. 600 milliGRAM(s) Oral every 6 hours  oxytocin Infusion 167 milliUNIT(s)/Min (167 mL/Hr) IV Continuous <Continuous>  oxytocin Infusion 167 milliUNIT(s)/Min (167 mL/Hr) IV Continuous <Continuous>  prenatal multivitamin 1 Tablet(s) Oral daily  sodium chloride 0.9% lock flush 3 milliLiter(s) IV Push every 8 hours    MEDICATIONS  (PRN):  benzocaine 20%/menthol 0.5% Spray 1 Spray(s) Topical every 6 hours PRN for Perineal discomfort  dibucaine 1% Ointment 1 Application(s) Topical every 6 hours PRN Perineal discomfort  diphenhydrAMINE 25 milliGRAM(s) Oral every 6 hours PRN Pruritus  hydrocortisone 1% Cream 1 Application(s) Topical every 6 hours PRN Moderate Pain (4-6)  lanolin Ointment 1 Application(s) Topical every 6 hours PRN nipple soreness  magnesium hydroxide Suspension 30 milliLiter(s) Oral two times a day PRN Constipation  oxyCODONE    IR 5 milliGRAM(s) Oral every 3 hours PRN Moderate to Severe Pain (4-10)  oxyCODONE    IR 5 milliGRAM(s) Oral once PRN Moderate to Severe Pain (4-10)  pramoxine 1%/zinc 5% Cream 1 Application(s) Topical every 4 hours PRN Moderate Pain (4-6)  simethicone 80 milliGRAM(s) Chew every 4 hours PRN Gas  witch hazel Pads 1 Application(s) Topical every 4 hours PRN Perineal discomfort    Physical Exam  Vital Signs Last 24 Hrs  T(C): 37.7 (17 Jan 2025 02:00), Max: 37.7 (17 Jan 2025 02:00)  T(F): 99.8 (17 Jan 2025 02:00), Max: 99.8 (17 Jan 2025 02:00)  HR: 87 (17 Jan 2025 06:23) (81 - 104)  BP: 126/71 (17 Jan 2025 06:23) (111/59 - 145/83)  RR: 20 (16 Jan 2025 22:56) (16 - 20)  SpO2: 100% (16 Jan 2025 22:56) (89% - 100%)    Parameters below as of 16 Jan 2025 22:56  Patient On (Oxygen Delivery Method): room air      Gen: AAOx3, NAD  Cardio: RRR, S1S2, no M/R/G  Resp: CTAB  Ext: No calf tenderness, no swelling  Fundus: firm, at the umbilicus, Nontender.   Abd: Soft, nontender, nondistended  Lochia: minimal     Labs:                        12.0   13.29 )-----------( 136      ( 16 Jan 2025 21:32 )             34.0                         12.8   13.24 )-----------( 129      ( 16 Jan 2025 10:40 )             36.3      
PGY4 Note    Patient seen at bedside for evaluation of labor progression, doing well, no complaints.     T(F): 98.24 (01-15-25 @ 20:27), Max: 98.24 (01-15-25 @ 20:27)  HR: 92 (01-16-25 @ 03:08) (65 - 210)  BP: 119/60 (01-16-25 @ 02:51) (109/60 - 154/87)  RR: 18 (01-16-25 @ 00:59) (16 - 18)  SpO2: 95% (01-16-25 @ 03:08) (94% - 97%)    Fetus A: 140/mod/pos acc  Fetus B: 125/mod/pos acc/spont decels  TOCO: 3-4  SVE: 4/80/-2, v, s/p AROM now with ISE and IUPC in place    Medications:  (ADM OVERRIDE): 1 Each (01-15-25 @ 12:40)  (ADM OVERRIDE): 1 Each (01-15-25 @ 12:40)  (ADM OVERRIDE): 1 Each (01-15-25 @ 20:43)  (ADM OVERRIDE): 1 Each (01-16-25 @ 00:13)  (ADM OVERRIDE): 1 Each (01-16-25 @ 01:46)  ampicillin  IVPB: 200 mL/Hr (01-15-25 @ 12:48)  ampicillin  IVPB: 108 mL/Hr (01-16-25 @ 01:30),  108 mL/Hr (01-15-25 @ 21:04),  108 mL/Hr (01-15-25 @ 17:05)  lactated ringers.: 125 mL/Hr (01-15-25 @ 17:06),  125 mL/Hr (01-15-25 @ 12:27),  125 mL/Hr (01-15-25 @ 11:58)  oxytocin Infusion.: 2 mL/Hr (01-15-25 @ 12:39)      Labs:                        13.9   9.15  )-----------( 134      ( 15 Marlo 2025 12:20 )             39.2           ABO RH Interpretation: A POS (01-15-25 @ 12:09)    Antibody Screen: NEG (01-15-25 @ 12:09)    Urinalysis Basic - ( 15 Marlo 2025 12:20 )    Color: Dark Yellow / Appearance: Cloudy / SG: >1.030 / pH: x  Gluc: x / Ketone: Negative mg/dL  / Bili: Small / Urobili: 1.0 mg/dL   Blood: x / Protein: 30 mg/dL / Nitrite: Negative   Leuk Esterase: Small / RBC: 3 /HPF / WBC 23 /HPF   Sq Epi: x / Non Sq Epi: 21 /HPF / Bacteria: Many /HPF      L&amp;D Drug Screen, Urine: Done (01-15-25 @ 12:20)    Prenatal Syphilis Test: Nonreact (01-15-25 @ 12:20)        
PGY4 Note    Patient seen at bedside for evaluation of labor progression, doing well, no complaints.     T(F): 98.24 (01-15-25 @ 20:27), Max: 98.24 (01-15-25 @ 20:27)  HR: 95 (01-16-25 @ 05:38) (65 - 210)  BP: 120/71 (01-16-25 @ 05:36) (109/57 - 154/87)  RR: 18 (01-16-25 @ 00:59) (16 - 18)  SpO2: 97% (01-16-25 @ 05:33) (89% - 98%)    Fetus A- 150/mod/pos acc  Fetus B- 120/mod/pos acc  TOCO: 3-4 mins, 100 MVUs  SVE: 5/90/-1, v, ruptured    Medications:  (ADM OVERRIDE): 1 Each (01-15-25 @ 12:40)  (ADM OVERRIDE): 1 Each (01-15-25 @ 12:40)  (ADM OVERRIDE): 1 Each (01-15-25 @ 20:43)  (ADM OVERRIDE): 1 Each (01-16-25 @ 00:13)  (ADM OVERRIDE): 1 Each (01-16-25 @ 01:46)  (ADM OVERRIDE): 1 Each (01-16-25 @ 05:28)  ampicillin  IVPB: 200 mL/Hr (01-15-25 @ 12:48)  ampicillin  IVPB: 108 mL/Hr (01-16-25 @ 01:30),  108 mL/Hr (01-15-25 @ 21:04),  108 mL/Hr (01-15-25 @ 17:05)  lactated ringers.: 125 mL/Hr (01-15-25 @ 17:06),  125 mL/Hr (01-15-25 @ 12:27),  125 mL/Hr (01-15-25 @ 11:58)  oxytocin Infusion.: 2 mL/Hr (01-15-25 @ 12:39)      Labs:                        13.9   9.15  )-----------( 134      ( 15 Marlo 2025 12:20 )             39.2           ABO RH Interpretation: A POS (01-15-25 @ 12:09)    Antibody Screen: NEG (01-15-25 @ 12:09)    Urinalysis Basic - ( 15 Marlo 2025 12:20 )    Color: Dark Yellow / Appearance: Cloudy / SG: >1.030 / pH: x  Gluc: x / Ketone: Negative mg/dL  / Bili: Small / Urobili: 1.0 mg/dL   Blood: x / Protein: 30 mg/dL / Nitrite: Negative   Leuk Esterase: Small / RBC: 3 /HPF / WBC 23 /HPF   Sq Epi: x / Non Sq Epi: 21 /HPF / Bacteria: Many /HPF      L&amp;D Drug Screen, Urine: Done (01-15-25 @ 12:20)    Prenatal Syphilis Test: Nonreact (01-15-25 @ 12:20)

## 2025-01-19 NOTE — PROGRESS NOTE ADULT - ASSESSMENT
34 yr old  @ 39w6d rh POS/GBS POS,, IOL for Di-Di twins, in early labor progressing well.  - Patient re-counseled on recommendation to proceeding with ROM for augmentation of labor as she has been on pitocin for >7hours. Risks of being on prolonged pitocin including increased risk of PPH were discussed. Patient declines ROM at this time, stating she would like more time to discuss and consider with her family at bedside. PMD made aware.    - Continue pitocin, currently @12mU  - Cont EFM/[place]  - Clear Liquids & IV Hydration  - Pain Management prn  - Monitor vitals  - ampicillin for GBS ppx
34 yr old  @ 40w0d rh POS/GBS POS, IOL for Di-Di twins, in early labor, now with gHTN r/o preeclampsia, s/p AROM on pitocin yara regularly.   - Continue pitocin, currently @6U  - Cont EFM/[place]  - Clear Liquids & IV Hydration  - Pain Management prn  - Monitor vitals, last /76  - ampicillin for GBS ppx  - Expectant management   
34 yr old  @ 40w0d rh POS/GBS POS, IOL for Di-Di twins, in early labor, now with gHTN r/o preeclampsia, s/p AROM, now with ISE and IUPC in plcae  - Currently on pitocin, at 2U will uptitrate  - IUPC in place, currently inadequate  - Cont EFM/[place]  - Clear Liquids & IV Hydration  - Pain Management prn  - Monitor vitals, last /71  - ampicillin for GBS ppx  - Expectant management       
34 yr old  @ 40w0d rh POS/GBS POS, IOL for Di-Di twins, in early labor, now with gHTN r/o preeclampsia, s/p AROM, now with ISE and IUPC in plcae  - Pitocin currently discontinued. Will restart after 20mins of category I tracing x2  - Cont EFM/[place]  - Clear Liquids & IV Hydration  - Pain Management prn  - Monitor vitals, last /60  - ampicillin for GBS ppx  - Expectant management       
34 yr old  @ 39w6d rh POS/GBS POS, IOL for Di-Di twins, in early labor, now with gHTN r/o preeclampsia, s/p AROM on pitocin yara regularly.   - Continue pitocin, currently @12mU  - Cont EFM/[place]  - Clear Liquids & IV Hydration  - Pain Management prn  - Monitor vitals, last /95  - ampicillin for GBS ppx  - Expectant management   
34 yr old  @ 40w0d, with gHTN, s/p  for Di-Di twins, c/b PPH QBL 1612 and bilateral hypogastric embolization, s/p 2U PRBCs, recovering well  -Epidural in place, will d/c in AM  -Posttransfusion h/h stable AM labs ordered  -[place] in place, f/u UO  -Pain management with PO medications  -S/p groin checks by IR RN  -Previously on bedrest, can now ambulate per IR  -routine postpartum care  -monitor vitals
35yo now P1 S/P  for di/di twins, PPD 2, course c/b PPH QBL 1612 and bilateral hypogastric embolization, s/p 2U PRBCs, recovering well.    #PPH  -post transfusion cbc stable  -denying anemia symptoms at this time  -no need for further labs at this time  -previously on bed rest, can ambulate now per IR  -s/p groin checks by IR RN    #Postpartum  - pain management with PO pain meds  - encourage ambulation  - PO hydration  - Advance diet as tolerated   - Monitor vitals and bleeding  - Declining birth control
35yo now P1 S/P  for di/di twins, PPD 3, course c/b PPH QBL 1612 and bilateral hypogastric embolization, s/p 2U PRBCs, recovering well.    #PPH  -post transfusion cbc stable  -denying anemia symptoms at this time  -no need for further labs at this time    #Postpartum  - pain management with PO pain meds  - encourage ambulation  - PO hydration  - Monitor vitals and bleeding    anticipate d/c home today
35yo now P1 S/P  for di/di twins, PPD 1, course c/b PPH QBL 1612 and bilateral hypogastric embolization, s/p 2U PRBCs, recovering well.    #PPH  -post transfusion cbc stable  -denying anemia symptoms at this time  -f/u AM CBC  -previously on bed rest, can ambulate now per IR  -s/p groin checks by IR RN    #Postpartum  - d/c epidural per anesthesia after AM labs  - [place] in situ, dc when ambulating and after AM labs  - pain management with PO pain meds  - encourage ambulation  - PO hydration  - Advance diet as tolerated   - Monitor vitals and bleeding  - Declining birth control

## 2025-01-22 LAB — SURGICAL PATHOLOGY STUDY: SIGNIFICANT CHANGE UP

## 2025-01-25 DIAGNOSIS — O40.3XX0 POLYHYDRAMNIOS, THIRD TRIMESTER, NOT APPLICABLE OR UNSPECIFIED: ICD-10-CM

## 2025-01-25 DIAGNOSIS — O30.043 TWIN PREGNANCY, DICHORIONIC/DIAMNIOTIC, THIRD TRIMESTER: ICD-10-CM

## 2025-01-25 DIAGNOSIS — Z3A.39 39 WEEKS GESTATION OF PREGNANCY: ICD-10-CM

## 2025-01-25 DIAGNOSIS — N39.498 OTHER SPECIFIED URINARY INCONTINENCE: ICD-10-CM

## 2025-02-14 PROBLEM — N80.9 ENDOMETRIOSIS, UNSPECIFIED: Chronic | Status: ACTIVE | Noted: 2025-01-15

## 2025-02-14 PROBLEM — O13.9 GESTATIONAL [PREGNANCY-INDUCED] HYPERTENSION WITHOUT SIGNIFICANT PROTEINURIA, UNSPECIFIED TRIMESTER: Chronic | Status: ACTIVE | Noted: 2025-01-16

## 2025-02-24 ENCOUNTER — NON-APPOINTMENT (OUTPATIENT)
Age: 35
End: 2025-02-24

## 2025-02-24 ENCOUNTER — APPOINTMENT (OUTPATIENT)
Dept: OBGYN | Facility: CLINIC | Age: 35
End: 2025-02-24
Payer: COMMERCIAL

## 2025-02-24 VITALS
HEIGHT: 66 IN | SYSTOLIC BLOOD PRESSURE: 119 MMHG | WEIGHT: 167 LBS | DIASTOLIC BLOOD PRESSURE: 75 MMHG | BODY MASS INDEX: 26.84 KG/M2

## 2025-02-24 PROCEDURE — 99212 OFFICE O/P EST SF 10 MIN: CPT

## 2025-02-24 PROCEDURE — 99213 OFFICE O/P EST LOW 20 MIN: CPT

## 2025-02-26 LAB
BV BACTERIA RRNA VAG QL NAA+PROBE: NOT DETECTED
C GLABRATA RNA VAG QL NAA+PROBE: NOT DETECTED
C TRACH RRNA SPEC QL NAA+PROBE: NOT DETECTED
CANDIDA RRNA VAG QL PROBE: NOT DETECTED
N GONORRHOEA RRNA SPEC QL NAA+PROBE: NOT DETECTED
T VAGINALIS RRNA SPEC QL NAA+PROBE: NOT DETECTED

## 2025-05-12 ENCOUNTER — APPOINTMENT (OUTPATIENT)
Dept: OBGYN | Facility: CLINIC | Age: 35
End: 2025-05-12
Payer: COMMERCIAL

## 2025-05-12 DIAGNOSIS — N89.8 OTHER SPECIFIED NONINFLAMMATORY DISORDERS OF VAGINA: ICD-10-CM

## 2025-05-12 PROCEDURE — 99213 OFFICE O/P EST LOW 20 MIN: CPT

## 2025-08-21 ENCOUNTER — NON-APPOINTMENT (OUTPATIENT)
Age: 35
End: 2025-08-21